# Patient Record
Sex: MALE | Race: WHITE
[De-identification: names, ages, dates, MRNs, and addresses within clinical notes are randomized per-mention and may not be internally consistent; named-entity substitution may affect disease eponyms.]

---

## 2017-03-03 ENCOUNTER — HOSPITAL ENCOUNTER (OUTPATIENT)
Dept: HOSPITAL 47 - LABWHC1 | Age: 72
Discharge: HOME | End: 2017-03-03
Attending: FAMILY MEDICINE
Payer: MEDICARE

## 2017-03-03 DIAGNOSIS — E11.65: Primary | ICD-10-CM

## 2017-03-03 LAB — HEMOGLOBIN A1C: 8.3 % (ref 4.2–6.1)

## 2017-03-03 PROCEDURE — 83036 HEMOGLOBIN GLYCOSYLATED A1C: CPT

## 2017-03-03 PROCEDURE — 36415 COLL VENOUS BLD VENIPUNCTURE: CPT

## 2017-06-26 ENCOUNTER — HOSPITAL ENCOUNTER (OUTPATIENT)
Dept: HOSPITAL 47 - RADCTMAIN | Age: 72
Discharge: HOME | End: 2017-06-26
Attending: FAMILY MEDICINE
Payer: MEDICARE

## 2017-06-26 DIAGNOSIS — G93.89: ICD-10-CM

## 2017-06-26 DIAGNOSIS — J34.9: ICD-10-CM

## 2017-06-26 DIAGNOSIS — G31.9: Primary | ICD-10-CM

## 2017-06-26 PROCEDURE — 70450 CT HEAD/BRAIN W/O DYE: CPT

## 2017-06-26 NOTE — CT
EXAMINATION TYPE: CT brain wo con

 

DATE OF EXAM: 6/26/2017

 

COMPARISON: NONE

 

HISTORY: 71-year-old male confusion, significant weight loss

 

TECHNIQUE:  Examination was done in axial plane without intravenous contrast.  Coronal and sagittal r
econstructions performed.

 

CT DLP: 1036.0 mGycm

Automated exposure control for dose reduction was used.

 

FINDINGS:

There is no evidence of  acute intracranial hemorrhage, acute ischemic changes, mass, mass-effect, or
 extra-axial fluid collection.  There is no effacement of cerebral sulci or basal subarachnoid cister
ns.  There is no hydrocephalus.  There is no midline shift.  Gray-white matter distinction is preserv
ed.

 

There is mild to moderate generalized cerebral cortical atrophy with moderate patchy periventricular 
and deep white matter hypodensities.

 

Moderate mucosal thickening throughout the right maxillary sinus. Mastoid air cells well pneumatized.
 Orbits and globes appear intact.

 

 

 

 

IMPRESSION:

 

1. No acute intracranial abnormality seen. 

2. Mild-to-moderate cerebral cortical atrophy and moderate patchy changes of chronic small vessel isc
hemic disease.

3. Moderate chronic right maxillary sinus disease.

## 2018-04-13 ENCOUNTER — HOSPITAL ENCOUNTER (OUTPATIENT)
Dept: HOSPITAL 47 - LABWHC1 | Age: 73
Discharge: HOME | End: 2018-04-13
Attending: FAMILY MEDICINE
Payer: MEDICARE

## 2018-04-13 DIAGNOSIS — E11.9: ICD-10-CM

## 2018-04-13 DIAGNOSIS — C61: Primary | ICD-10-CM

## 2018-04-13 LAB
ANION GAP SERPL CALC-SCNC: 17 MMOL/L
BUN SERPL-SCNC: 34 MG/DL (ref 9–20)
CALCIUM SPEC-MCNC: 9.9 MG/DL (ref 8.4–10.2)
CHLORIDE SERPL-SCNC: 102 MMOL/L (ref 98–107)
CO2 SERPL-SCNC: 26 MMOL/L (ref 22–30)
GLUCOSE SERPL-MCNC: 242 MG/DL (ref 74–99)
HBA1C MFR BLD: 6.3 % (ref 4–6)
POTASSIUM SERPL-SCNC: 5.2 MMOL/L (ref 3.5–5.1)
PSA SERPL-MCNC: <0.1 NG/ML (ref 0–4)
SODIUM SERPL-SCNC: 145 MMOL/L (ref 137–145)

## 2018-04-13 PROCEDURE — 80048 BASIC METABOLIC PNL TOTAL CA: CPT

## 2018-04-13 PROCEDURE — 36415 COLL VENOUS BLD VENIPUNCTURE: CPT

## 2018-04-13 PROCEDURE — 83036 HEMOGLOBIN GLYCOSYLATED A1C: CPT

## 2018-07-17 ENCOUNTER — HOSPITAL ENCOUNTER (OUTPATIENT)
Dept: HOSPITAL 47 - LABWHC1 | Age: 73
Discharge: HOME | End: 2018-07-17
Attending: FAMILY MEDICINE
Payer: MEDICARE

## 2018-07-17 DIAGNOSIS — E11.9: ICD-10-CM

## 2018-07-17 DIAGNOSIS — E78.5: Primary | ICD-10-CM

## 2018-07-17 LAB
ALBUMIN SERPL-MCNC: 4.1 G/DL (ref 3.5–5)
ALP SERPL-CCNC: 90 U/L (ref 38–126)
ALT SERPL-CCNC: 21 U/L (ref 21–72)
ANION GAP SERPL CALC-SCNC: 10 MMOL/L
AST SERPL-CCNC: 14 U/L (ref 17–59)
BUN SERPL-SCNC: 32 MG/DL (ref 9–20)
CALCIUM SPEC-MCNC: 9.4 MG/DL (ref 8.4–10.2)
CHLORIDE SERPL-SCNC: 105 MMOL/L (ref 98–107)
CHOLEST SERPL-MCNC: 121 MG/DL (ref ?–200)
CO2 SERPL-SCNC: 27 MMOL/L (ref 22–30)
GLUCOSE SERPL-MCNC: 225 MG/DL (ref 74–99)
HBA1C MFR BLD: 8 % (ref 4–6)
HDLC SERPL-MCNC: 42 MG/DL (ref 40–60)
LDLC SERPL CALC-MCNC: 46 MG/DL (ref 0–99)
POTASSIUM SERPL-SCNC: 4.7 MMOL/L (ref 3.5–5.1)
PROT SERPL-MCNC: 6.7 G/DL (ref 6.3–8.2)
SODIUM SERPL-SCNC: 142 MMOL/L (ref 137–145)
TRIGL SERPL-MCNC: 167 MG/DL (ref ?–150)

## 2018-07-17 PROCEDURE — 83036 HEMOGLOBIN GLYCOSYLATED A1C: CPT

## 2018-07-17 PROCEDURE — 36415 COLL VENOUS BLD VENIPUNCTURE: CPT

## 2018-07-17 PROCEDURE — 80053 COMPREHEN METABOLIC PANEL: CPT

## 2018-07-17 PROCEDURE — 80061 LIPID PANEL: CPT

## 2018-08-09 ENCOUNTER — HOSPITAL ENCOUNTER (OUTPATIENT)
Dept: HOSPITAL 47 - RADUSWWP | Age: 73
Discharge: HOME | End: 2018-08-09
Attending: FAMILY MEDICINE
Payer: MEDICARE

## 2018-08-09 DIAGNOSIS — N18.3: Primary | ICD-10-CM

## 2018-08-09 PROCEDURE — 76770 US EXAM ABDO BACK WALL COMP: CPT

## 2018-08-09 NOTE — US
EXAMINATION TYPE: US kidneys/renal and bladder

 

DATE OF EXAM: 8/9/2018

 

COMPARISON: NONE

 

CLINICAL HISTORY: N18.3 Chronic kidney disease stage 3.

 

EXAM MEASUREMENTS:

 

Right Kidney:  11.4 x 4.9 x 5.7 cm

Left Kidney: 11.0 x 5.5 x 5.5 cm

 

 

 

 

Right Kidney: No hydronephrosis or masses seen  

Left Kidney: No hydronephrosis or masses seen  

Bladder: distended

**Bilateral Jets seen: Yes

 

 

There is no evidence for hydronephrosis at this point in time.  No nephrolithiasis is seen.  No nenita
s are identified.  The urinary bladder is anechoic.  Bilateral ureteral jets are seen.

 

 

 

IMPRESSION:

No hydronephrosis is evident bilaterally.

## 2018-10-22 ENCOUNTER — HOSPITAL ENCOUNTER (OUTPATIENT)
Dept: HOSPITAL 47 - LABWHC1 | Age: 73
Discharge: HOME | End: 2018-10-22
Payer: MEDICARE

## 2018-10-22 DIAGNOSIS — E11.22: ICD-10-CM

## 2018-10-22 DIAGNOSIS — N18.3: ICD-10-CM

## 2018-10-22 DIAGNOSIS — C61: ICD-10-CM

## 2018-10-22 DIAGNOSIS — E11.65: Primary | ICD-10-CM

## 2018-10-22 LAB
ANION GAP SERPL CALC-SCNC: 10 MMOL/L
BASOPHILS # BLD AUTO: 0 K/UL (ref 0–0.2)
BASOPHILS NFR BLD AUTO: 1 %
BUN SERPL-SCNC: 34 MG/DL (ref 9–20)
CALCIUM SPEC-MCNC: 9.2 MG/DL (ref 8.4–10.2)
CHLORIDE SERPL-SCNC: 106 MMOL/L (ref 98–107)
CO2 SERPL-SCNC: 26 MMOL/L (ref 22–30)
EOSINOPHIL # BLD AUTO: 0.5 K/UL (ref 0–0.7)
EOSINOPHIL NFR BLD AUTO: 7 %
ERYTHROCYTE [DISTWIDTH] IN BLOOD BY AUTOMATED COUNT: 3.66 M/UL (ref 4.3–5.9)
ERYTHROCYTE [DISTWIDTH] IN BLOOD: 12.5 % (ref 11.5–15.5)
GLUCOSE SERPL-MCNC: 322 MG/DL (ref 74–99)
HBA1C MFR BLD: 8.5 % (ref 4–6)
HCT VFR BLD AUTO: 36.3 % (ref 39–53)
HGB BLD-MCNC: 11.9 GM/DL (ref 13–17.5)
LYMPHOCYTES # SPEC AUTO: 0.9 K/UL (ref 1–4.8)
LYMPHOCYTES NFR SPEC AUTO: 15 %
MAGNESIUM SPEC-SCNC: 1.9 MG/DL (ref 1.6–2.3)
MCH RBC QN AUTO: 32.6 PG (ref 25–35)
MCHC RBC AUTO-ENTMCNC: 32.9 G/DL (ref 31–37)
MCV RBC AUTO: 99.1 FL (ref 80–100)
MONOCYTES # BLD AUTO: 0.3 K/UL (ref 0–1)
MONOCYTES NFR BLD AUTO: 5 %
NEUTROPHILS # BLD AUTO: 4.5 K/UL (ref 1.3–7.7)
NEUTROPHILS NFR BLD AUTO: 71 %
PLATELET # BLD AUTO: 153 K/UL (ref 150–450)
POTASSIUM SERPL-SCNC: 4.7 MMOL/L (ref 3.5–5.1)
PSA SERPL-MCNC: <0.1 NG/ML (ref 0–4)
PTH-INTACT SERPL-MCNC: 65.3 PG/ML (ref 14–72)
SODIUM SERPL-SCNC: 142 MMOL/L (ref 137–145)
URATE SERPL-MCNC: 5.2 MG/DL (ref 3.5–8.5)
WBC # BLD AUTO: 6.4 K/UL (ref 3.8–10.6)

## 2018-10-22 PROCEDURE — 82306 VITAMIN D 25 HYDROXY: CPT

## 2018-10-22 PROCEDURE — 82043 UR ALBUMIN QUANTITATIVE: CPT

## 2018-10-22 PROCEDURE — 83970 ASSAY OF PARATHORMONE: CPT

## 2018-10-22 PROCEDURE — 84550 ASSAY OF BLOOD/URIC ACID: CPT

## 2018-10-22 PROCEDURE — 84100 ASSAY OF PHOSPHORUS: CPT

## 2018-10-22 PROCEDURE — 80048 BASIC METABOLIC PNL TOTAL CA: CPT

## 2018-10-22 PROCEDURE — 83036 HEMOGLOBIN GLYCOSYLATED A1C: CPT

## 2018-10-22 PROCEDURE — 85025 COMPLETE CBC W/AUTO DIFF WBC: CPT

## 2018-10-22 PROCEDURE — 82570 ASSAY OF URINE CREATININE: CPT

## 2018-10-22 PROCEDURE — 36415 COLL VENOUS BLD VENIPUNCTURE: CPT

## 2018-10-22 PROCEDURE — 83735 ASSAY OF MAGNESIUM: CPT

## 2018-11-26 ENCOUNTER — HOSPITAL ENCOUNTER (OUTPATIENT)
Dept: HOSPITAL 47 - LABWHC1 | Age: 73
Discharge: HOME | End: 2018-11-26
Attending: INTERNAL MEDICINE
Payer: MEDICARE

## 2018-11-26 DIAGNOSIS — N39.0: ICD-10-CM

## 2018-11-26 DIAGNOSIS — D64.9: Primary | ICD-10-CM

## 2018-11-26 DIAGNOSIS — E83.39: ICD-10-CM

## 2018-11-26 LAB
ALBUMIN SERPL-MCNC: 4.3 G/DL (ref 3.8–4.9)
ALBUMIN/GLOB SERPL: 2.05 G/DL (ref 1.2–2.1)
ALP SERPL-CCNC: 88 U/L (ref 41–126)
ALT SERPL-CCNC: 11 U/L (ref 10–49)
ANION GAP SERPL CALC-SCNC: 5.8 MMOL/L (ref 4–12)
AST SERPL-CCNC: 14 U/L (ref 14–35)
BUN SERPL-SCNC: 38 MG/DL (ref 9–27)
CALCIUM SPEC-MCNC: 9 MG/DL (ref 8.7–10.3)
CHLORIDE SERPL-SCNC: 110 MMOL/L (ref 96–109)
CO2 SERPL-SCNC: 26.2 MMOL/L (ref 21.6–31.8)
ERYTHROCYTE [DISTWIDTH] IN BLOOD BY AUTOMATED COUNT: 3.54 M/UL (ref 4.3–5.9)
ERYTHROCYTE [DISTWIDTH] IN BLOOD: 12.5 % (ref 11.5–15.5)
GLOBULIN SER CALC-MCNC: 2.1 G/DL (ref 2.1–3.7)
GLUCOSE SERPL-MCNC: 313 MG/DL (ref 70–110)
GLUCOSE UR QL: (no result)
HCT VFR BLD AUTO: 35.1 % (ref 39–53)
HGB BLD-MCNC: 11.7 GM/DL (ref 13–17.5)
MCH RBC QN AUTO: 33 PG (ref 25–35)
MCHC RBC AUTO-ENTMCNC: 33.3 G/DL (ref 31–37)
MCV RBC AUTO: 99 FL (ref 80–100)
PH UR: 5 [PH] (ref 5–8)
PLATELET # BLD AUTO: 157 K/UL (ref 150–450)
POTASSIUM SERPL-SCNC: 4.4 MMOL/L (ref 3.5–5.5)
PROT SERPL-MCNC: 6.4 G/DL (ref 6.2–8.2)
PROT UR QL: (no result)
RBC UR QL: 4 /HPF (ref 0–5)
SODIUM SERPL-SCNC: 142 MMOL/L (ref 135–145)
SP GR UR: 1.01 (ref 1–1.03)
SQUAMOUS UR QL AUTO: <1 /HPF (ref 0–4)
UROBILINOGEN UR QL STRIP: <2 MG/DL (ref ?–2)
WBC # BLD AUTO: 6.6 K/UL (ref 3.8–10.6)
WBC #/AREA URNS HPF: 3 /HPF (ref 0–5)

## 2018-11-26 PROCEDURE — 36415 COLL VENOUS BLD VENIPUNCTURE: CPT

## 2018-11-26 PROCEDURE — 85027 COMPLETE CBC AUTOMATED: CPT

## 2018-11-26 PROCEDURE — 84100 ASSAY OF PHOSPHORUS: CPT

## 2018-11-26 PROCEDURE — 81001 URINALYSIS AUTO W/SCOPE: CPT

## 2018-11-26 PROCEDURE — 80053 COMPREHEN METABOLIC PANEL: CPT

## 2018-12-11 ENCOUNTER — HOSPITAL ENCOUNTER (OUTPATIENT)
Dept: HOSPITAL 47 - ORWHC2ENDO | Age: 73
Discharge: HOME | End: 2018-12-11
Attending: SURGERY
Payer: MEDICARE

## 2018-12-11 VITALS — BODY MASS INDEX: 21.3 KG/M2

## 2018-12-11 VITALS — RESPIRATION RATE: 20 BRPM | HEART RATE: 95 BPM

## 2018-12-11 VITALS — TEMPERATURE: 98.7 F

## 2018-12-11 VITALS — SYSTOLIC BLOOD PRESSURE: 143 MMHG | DIASTOLIC BLOOD PRESSURE: 81 MMHG

## 2018-12-11 DIAGNOSIS — Z88.2: ICD-10-CM

## 2018-12-11 DIAGNOSIS — Z79.84: ICD-10-CM

## 2018-12-11 DIAGNOSIS — Z79.1: ICD-10-CM

## 2018-12-11 DIAGNOSIS — E11.9: ICD-10-CM

## 2018-12-11 DIAGNOSIS — Z79.82: ICD-10-CM

## 2018-12-11 DIAGNOSIS — Z85.46: ICD-10-CM

## 2018-12-11 DIAGNOSIS — I10: ICD-10-CM

## 2018-12-11 DIAGNOSIS — E78.5: ICD-10-CM

## 2018-12-11 DIAGNOSIS — Z12.11: Primary | ICD-10-CM

## 2018-12-11 LAB
GLUCOSE BLD-MCNC: 212 MG/DL (ref 75–99)
GLUCOSE BLD-MCNC: 224 MG/DL (ref 75–99)

## 2018-12-11 PROCEDURE — 45378 DIAGNOSTIC COLONOSCOPY: CPT

## 2018-12-11 NOTE — P.OP
Date of Procedure: 12/11/18


Preoperative Diagnosis: 


Screening colonoscopy


Postoperative Diagnosis: 


Poor colonic preparation


Procedure(s) Performed: 


Attempted colonoscopy


Anesthesia: MAC


Surgeon: Edward Bhat


Pathology: none sent (Home)


Condition: stable


Disposition: PACU


Description of Procedure: 


The patient's placed on the endoscopy table in the lateral position.  He 

received IV sedation.  Digital rectal exam was performed which revealed a large 

amount of solid stool in the rectum.  The colonoscope was placed patient anus 

and passed the rectum.  The large stool burden was seen.  At this point the 

scope was withdrawn.  The patient will need to be reprepped and brought back 

for colonoscopy.

## 2018-12-11 NOTE — P.GSHP
History of Present Illness


H&P Date: 12/11/18


Chief Complaint: Screening colonoscopy





This a 73-year-old male referred from Dr. Busby.  Patient presents today for 

screening colonoscopy.  She denies a significant GI complaints





Past Medical History


Past Medical History: Cancer, Diabetes Mellitus, Prostate Disorder, Renal 

Disease


Additional Past Medical History / Comment(s): HX PROSTATE CANCER WITH SURGERY (

2008), STATES DECREASED KIDNEY FUNCTION (SEEN DR ANAND).


History of Any Multi-Drug Resistant Organisms: None Reported


Past Surgical History: Orthopedic Surgery, Prostate Surgery


Additional Past Surgical History / Comment(s): SURGERY FOR RIGHT ANKLE & RIGHT 

LEG FX WITH PINS & PLATE (2010), CATARACTS.


Past Anesthesia/Blood Transfusion Reactions: No Reported Reaction


Past Psychological History: No Psychological Hx Reported


Smoking Status: Never smoker


Past Alcohol Use History: Rare


Past Drug Use History: None Reported





- Past Family History


  ** Mother


Family Medical History: No Reported History





Medications and Allergies


 Home Medications











 Medication  Instructions  Recorded  Confirmed  Type


 


Aspirin [Adult Low Dose Aspirin EC] 81 mg PO DAILY 12/10/18 12/10/18 History


 


Ibuprofen [Motrin Ib] 400 mg PO AS DIRECTED PRN 12/10/18 12/10/18 History


 


Simvastatin [Zocor] 40 mg PO DAILY 12/10/18 12/11/18 History


 


Telmisartan [Micardis] 80 mg PO DAILY 12/10/18 12/11/18 History


 


amLODIPine [Norvasc] 5 mg PO AC-LUNCH 12/10/18 12/11/18 History


 


glipiZIDE [Glucotrol] 10 mg PO 1130,1830 12/10/18 12/11/18 History


 


sitaGLIPtin [Januvia] 50 mg PO AC-LUNCH 12/10/18 12/11/18 History











 Allergies











Allergy/AdvReac Type Severity Reaction Status Date / Time


 


Fish Containing Products Allergy  Swelling Verified 12/11/18 07:09





[Fish]     


 


Sulfa (Sulfonamide Allergy  Unknown Verified 12/11/18 07:09





Antibiotics)   Childhood  














Surgical - Exam


 Vital Signs











Temp Pulse Resp BP Pulse Ox


 


 98.7 F   103 H  18   158/73   98 


 


 12/11/18 07:22  12/11/18 07:22  12/11/18 07:22  12/11/18 07:22  12/11/18 07:22














- General


well developed, no distress





- Eyes


PERRL





- ENT


normal pinna





- Neck


no masses





- Respiratory


normal expansion





- Cardiovascular


Rhythm: regular





- Abdomen


Abdomen: soft, non tender





Results





- Labs


 Abnormal Lab Results - Last 24 Hours (Table)











  12/11/18 Range/Units





  07:26 


 


POC Glucose (mg/dL)  212 H  (75-99)  mg/dL














Assessment and Plan


Assessment: 





We'll perform screening colonoscopy.

## 2018-12-12 ENCOUNTER — HOSPITAL ENCOUNTER (OUTPATIENT)
Dept: HOSPITAL 47 - ORWHC2ENDO | Age: 73
Discharge: HOME | End: 2018-12-12
Attending: SURGERY
Payer: MEDICARE

## 2018-12-12 VITALS — SYSTOLIC BLOOD PRESSURE: 123 MMHG | HEART RATE: 92 BPM | DIASTOLIC BLOOD PRESSURE: 72 MMHG

## 2018-12-12 VITALS — TEMPERATURE: 98.5 F

## 2018-12-12 VITALS — RESPIRATION RATE: 16 BRPM

## 2018-12-12 DIAGNOSIS — Z79.82: ICD-10-CM

## 2018-12-12 DIAGNOSIS — Z91.013: ICD-10-CM

## 2018-12-12 DIAGNOSIS — Z12.11: Primary | ICD-10-CM

## 2018-12-12 DIAGNOSIS — N42.9: ICD-10-CM

## 2018-12-12 DIAGNOSIS — K57.30: ICD-10-CM

## 2018-12-12 DIAGNOSIS — Z79.899: ICD-10-CM

## 2018-12-12 DIAGNOSIS — E11.9: ICD-10-CM

## 2018-12-12 DIAGNOSIS — Z88.2: ICD-10-CM

## 2018-12-12 DIAGNOSIS — Z79.84: ICD-10-CM

## 2018-12-12 DIAGNOSIS — Z85.9: ICD-10-CM

## 2018-12-12 LAB — GLUCOSE BLD-MCNC: 216 MG/DL (ref 75–99)

## 2018-12-12 NOTE — P.GSHP
History of Present Illness


H&P Date: 12/12/18


Chief Complaint: Screening colonoscopy





This is a 73-year-old male who presents today for colonoscopy.  Patient had an 

attempted colonoscopy yesterday.  However he was fully stool.  Patient 

reprepped and presents today for colonoscopy.





Past Medical History


Past Medical History: Cancer, Diabetes Mellitus, Prostate Disorder


Additional Past Medical History / Comment(s): PROSTATE


History of Any Multi-Drug Resistant Organisms: None Reported


Past Surgical History: Orthopedic Surgery, Prostate Surgery


Additional Past Surgical History / Comment(s): RIGHT ANKLE, RIGHT LEG, RIGHT 

CATARACT


Past Anesthesia/Blood Transfusion Reactions: No Reported Reaction


Past Psychological History: No Psychological Hx Reported


Smoking Status: Never smoker


Past Alcohol Use History: Rare


Past Drug Use History: None Reported





- Past Family History


  ** Mother


Family Medical History: No Reported History





Medications and Allergies


 Home Medications











 Medication  Instructions  Recorded  Confirmed  Type


 


Aspirin [Adult Low Dose Aspirin EC] 81 mg PO DAILY 12/10/18 12/12/18 History


 


Ibuprofen [Motrin Ib] 400 mg PO AS DIRECTED PRN 12/10/18 12/12/18 History


 


Simvastatin [Zocor] 40 mg PO DAILY 12/10/18 12/12/18 History


 


Telmisartan [Micardis] 80 mg PO DAILY 12/10/18 12/12/18 History


 


amLODIPine [Norvasc] 5 mg PO AC-LUNCH 12/10/18 12/12/18 History


 


glipiZIDE [Glucotrol] 10 mg PO 1130,1830 12/10/18 12/12/18 History


 


sitaGLIPtin [Januvia] 50 mg PO AC-LUNCH 12/10/18 12/12/18 History











 Allergies











Allergy/AdvReac Type Severity Reaction Status Date / Time


 


Fish Containing Products Allergy  Swelling Verified 12/12/18 10:04





[Fish]     


 


Sulfa (Sulfonamide Allergy  Unknown Verified 12/12/18 10:04





Antibiotics)   Childhood  














Surgical - Exam


 Vital Signs











Temp Pulse Resp BP Pulse Ox


 


 98.5 F   113 H  18   141/65   97 


 


 12/12/18 10:21  12/12/18 10:21  12/12/18 10:21  12/12/18 10:21  12/12/18 10:21














- General


well developed, no distress





- Eyes


PERRL





- ENT


normal pinna





- Neck


no masses





- Respiratory


normal expansion





- Cardiovascular


Rhythm: regular





- Abdomen


Abdomen: soft, non tender





Results





- Labs


 Abnormal Lab Results - Last 24 Hours (Table)











  12/12/18 Range/Units





  10:12 


 


POC Glucose (mg/dL)  216 H  (75-99)  mg/dL














Assessment and Plan


Assessment: 





Constipation





We'll perform screening colonoscopy.

## 2018-12-12 NOTE — P.OP
Date of Procedure: 12/12/18


Preoperative Diagnosis: 


Screening colonoscopy


Postoperative Diagnosis: 


Diverticulosis


Procedure(s) Performed: 


Colonoscopy


Anesthesia: MAC


Surgeon: Edward Bhat


Pathology: none sent


Condition: stable


Disposition: PACU


Description of Procedure: 


The patient's placed on the endoscopy table in the lateral position.  He 

received IV sedation.  Digital rectal exam was performed which revealed no 

abnormalities.  The flexible colonoscope was then placed patient anus passed 

throughout the entire colon.  The ileocecal valve was visualized.  Cecum, 

ascending and transverse colon appeared normal.  In the descending and sigmoid 

colon there is moderate diverticular changes.  Scope was then brought back the 

rectum and this appeared normal.  Scope was withdrawn for patient.

## 2018-12-28 ENCOUNTER — HOSPITAL ENCOUNTER (OUTPATIENT)
Dept: HOSPITAL 47 - LABWHC1 | Age: 73
End: 2018-12-28
Attending: INTERNAL MEDICINE
Payer: MEDICARE

## 2018-12-28 DIAGNOSIS — N18.4: ICD-10-CM

## 2018-12-28 DIAGNOSIS — R53.83: ICD-10-CM

## 2018-12-28 DIAGNOSIS — M10.9: ICD-10-CM

## 2018-12-28 DIAGNOSIS — R80.9: ICD-10-CM

## 2018-12-28 DIAGNOSIS — E21.3: ICD-10-CM

## 2018-12-28 DIAGNOSIS — E55.9: Primary | ICD-10-CM

## 2018-12-28 DIAGNOSIS — D63.1: ICD-10-CM

## 2018-12-28 LAB
ALBUMIN SERPL-MCNC: 4.3 G/DL (ref 3.8–4.9)
ANION GAP SERPL CALC-SCNC: 10.5 MMOL/L (ref 4–12)
BASOPHILS # BLD AUTO: 0 K/UL (ref 0–0.2)
BASOPHILS NFR BLD AUTO: 1 %
BUN SERPL-SCNC: 28 MG/DL (ref 9–27)
CALCIUM SPEC-MCNC: 8.8 MG/DL (ref 8.7–10.3)
CHLORIDE SERPL-SCNC: 110 MMOL/L (ref 96–109)
CO2 SERPL-SCNC: 23.5 MMOL/L (ref 21.6–31.8)
DSDNA AB SER QL: NEGATIVE
DSDNA AB TITR SER: <1 IU/ML
EOSINOPHIL # BLD AUTO: 0.2 K/UL (ref 0–0.7)
EOSINOPHIL NFR BLD AUTO: 4 %
ERYTHROCYTE [DISTWIDTH] IN BLOOD BY AUTOMATED COUNT: 3.72 M/UL (ref 4.3–5.9)
ERYTHROCYTE [DISTWIDTH] IN BLOOD: 12.8 % (ref 11.5–15.5)
GLUCOSE SERPL-MCNC: 204 MG/DL (ref 70–110)
GLUCOSE UR QL: (no result)
HCT VFR BLD AUTO: 36.7 % (ref 39–53)
HGB BLD-MCNC: 12.1 GM/DL (ref 13–17.5)
LYMPHOCYTES # SPEC AUTO: 0.9 K/UL (ref 1–4.8)
LYMPHOCYTES NFR SPEC AUTO: 17 %
MAGNESIUM SPEC-SCNC: 2 MG/DL (ref 1.5–2.4)
MCH RBC QN AUTO: 32.6 PG (ref 25–35)
MCHC RBC AUTO-ENTMCNC: 33.1 G/DL (ref 31–37)
MCV RBC AUTO: 98.6 FL (ref 80–100)
MONOCYTES # BLD AUTO: 0.3 K/UL (ref 0–1)
MONOCYTES NFR BLD AUTO: 6 %
NEUTROPHILS # BLD AUTO: 3.7 K/UL (ref 1.3–7.7)
NEUTROPHILS NFR BLD AUTO: 72 %
PH UR: 5.5 [PH] (ref 5–8)
PLATELET # BLD AUTO: 180 K/UL (ref 150–450)
POTASSIUM SERPL-SCNC: 4.4 MMOL/L (ref 3.5–5.5)
PROT UR QL: (no result)
PTH-INTACT SERPL-MCNC: 123.8 PG/ML (ref 14–72)
RBC UR QL: 9 /HPF (ref 0–5)
SODIUM SERPL-SCNC: 144 MMOL/L (ref 135–145)
SP GR UR: 1.02 (ref 1–1.03)
URATE SERPL-MCNC: 5.9 MG/DL (ref 3.7–8.7)
UROBILINOGEN UR QL STRIP: <2 MG/DL (ref ?–2)
WBC # BLD AUTO: 5.1 K/UL (ref 3.8–10.6)
WBC #/AREA URNS HPF: <1 /HPF (ref 0–5)

## 2018-12-28 PROCEDURE — 81001 URINALYSIS AUTO W/SCOPE: CPT

## 2018-12-28 PROCEDURE — 86803 HEPATITIS C AB TEST: CPT

## 2018-12-28 PROCEDURE — 83516 IMMUNOASSAY NONANTIBODY: CPT

## 2018-12-28 PROCEDURE — 82728 ASSAY OF FERRITIN: CPT

## 2018-12-28 PROCEDURE — 86255 FLUORESCENT ANTIBODY SCREEN: CPT

## 2018-12-28 PROCEDURE — 84550 ASSAY OF BLOOD/URIC ACID: CPT

## 2018-12-28 PROCEDURE — 82040 ASSAY OF SERUM ALBUMIN: CPT

## 2018-12-28 PROCEDURE — 86160 COMPLEMENT ANTIGEN: CPT

## 2018-12-28 PROCEDURE — 87340 HEPATITIS B SURFACE AG IA: CPT

## 2018-12-28 PROCEDURE — 83970 ASSAY OF PARATHORMONE: CPT

## 2018-12-28 PROCEDURE — 36415 COLL VENOUS BLD VENIPUNCTURE: CPT

## 2018-12-28 PROCEDURE — 85025 COMPLETE CBC W/AUTO DIFF WBC: CPT

## 2018-12-28 PROCEDURE — 84100 ASSAY OF PHOSPHORUS: CPT

## 2018-12-28 PROCEDURE — 86162 COMPLEMENT TOTAL (CH50): CPT

## 2018-12-28 PROCEDURE — 82570 ASSAY OF URINE CREATININE: CPT

## 2018-12-28 PROCEDURE — 86038 ANTINUCLEAR ANTIBODIES: CPT

## 2018-12-28 PROCEDURE — 86335 IMMUNFIX E-PHORSIS/URINE/CSF: CPT

## 2018-12-28 PROCEDURE — 82306 VITAMIN D 25 HYDROXY: CPT

## 2018-12-28 PROCEDURE — 83540 ASSAY OF IRON: CPT

## 2018-12-28 PROCEDURE — 83735 ASSAY OF MAGNESIUM: CPT

## 2018-12-28 PROCEDURE — 84156 ASSAY OF PROTEIN URINE: CPT

## 2018-12-28 PROCEDURE — 84165 PROTEIN E-PHORESIS SERUM: CPT

## 2018-12-28 PROCEDURE — 83550 IRON BINDING TEST: CPT

## 2018-12-28 PROCEDURE — 86225 DNA ANTIBODY NATIVE: CPT

## 2018-12-28 PROCEDURE — 80048 BASIC METABOLIC PNL TOTAL CA: CPT

## 2018-12-28 PROCEDURE — 81050 URINALYSIS VOLUME MEASURE: CPT

## 2018-12-29 LAB
PROT 24H UR-MRATE: 345.6 MG/24HR
SPECIMEN VOL 24H UR: 800 ML

## 2019-02-01 ENCOUNTER — HOSPITAL ENCOUNTER (OUTPATIENT)
Dept: HOSPITAL 47 - LABWHC1 | Age: 74
Discharge: HOME | End: 2019-02-01
Payer: MEDICARE

## 2019-02-01 DIAGNOSIS — E11.65: ICD-10-CM

## 2019-02-01 DIAGNOSIS — E78.5: Primary | ICD-10-CM

## 2019-02-01 LAB
ALBUMIN SERPL-MCNC: 4.2 G/DL (ref 3.8–4.9)
ALBUMIN/GLOB SERPL: 2.1 G/DL (ref 1.6–3.17)
ALP SERPL-CCNC: 72 U/L (ref 41–126)
ALT SERPL-CCNC: 19 U/L (ref 10–49)
ANION GAP SERPL CALC-SCNC: 12.2 MMOL/L (ref 4–12)
AST SERPL-CCNC: 19 U/L (ref 14–35)
BUN SERPL-SCNC: 33 MG/DL (ref 9–27)
CALCIUM SPEC-MCNC: 9.1 MG/DL (ref 8.7–10.3)
CHLORIDE SERPL-SCNC: 107 MMOL/L (ref 96–109)
CHOLEST SERPL-MCNC: 102 MG/DL (ref 0–200)
CO2 SERPL-SCNC: 23.8 MMOL/L (ref 21.6–31.8)
GLOBULIN SER CALC-MCNC: 2 G/DL (ref 1.6–3.3)
GLUCOSE SERPL-MCNC: 198 MG/DL (ref 70–110)
HBA1C MFR BLD: 7.2 % (ref 4–6)
HDLC SERPL-MCNC: 42 MG/DL (ref 40–60)
LDLC SERPL CALC-MCNC: 45.6 MG/DL (ref 0–131)
POTASSIUM SERPL-SCNC: 4.8 MMOL/L (ref 3.5–5.5)
PROT SERPL-MCNC: 6.2 G/DL (ref 6.2–8.2)
SODIUM SERPL-SCNC: 143 MMOL/L (ref 135–145)
TRIGL SERPL-MCNC: 72 MG/DL (ref 0–149)
VLDLC SERPL CALC-MCNC: 14.4 MG/DL (ref 5–40)

## 2019-02-01 PROCEDURE — 36415 COLL VENOUS BLD VENIPUNCTURE: CPT

## 2019-02-01 PROCEDURE — 83036 HEMOGLOBIN GLYCOSYLATED A1C: CPT

## 2019-02-01 PROCEDURE — 80061 LIPID PANEL: CPT

## 2019-02-01 PROCEDURE — 80053 COMPREHEN METABOLIC PANEL: CPT

## 2019-03-14 ENCOUNTER — HOSPITAL ENCOUNTER (INPATIENT)
Dept: HOSPITAL 47 - EC | Age: 74
LOS: 8 days | Discharge: HOME HEALTH | DRG: 291 | End: 2019-03-22
Attending: INTERNAL MEDICINE | Admitting: INTERNAL MEDICINE
Payer: MEDICARE

## 2019-03-14 VITALS — BODY MASS INDEX: 23.6 KG/M2

## 2019-03-14 DIAGNOSIS — E11.65: ICD-10-CM

## 2019-03-14 DIAGNOSIS — Z79.82: ICD-10-CM

## 2019-03-14 DIAGNOSIS — E78.5: ICD-10-CM

## 2019-03-14 DIAGNOSIS — E11.22: ICD-10-CM

## 2019-03-14 DIAGNOSIS — I13.0: Primary | ICD-10-CM

## 2019-03-14 DIAGNOSIS — I25.10: ICD-10-CM

## 2019-03-14 DIAGNOSIS — E78.00: ICD-10-CM

## 2019-03-14 DIAGNOSIS — Z98.41: ICD-10-CM

## 2019-03-14 DIAGNOSIS — I27.20: ICD-10-CM

## 2019-03-14 DIAGNOSIS — N18.4: ICD-10-CM

## 2019-03-14 DIAGNOSIS — I50.23: ICD-10-CM

## 2019-03-14 DIAGNOSIS — I24.8: ICD-10-CM

## 2019-03-14 DIAGNOSIS — I08.1: ICD-10-CM

## 2019-03-14 DIAGNOSIS — T50.2X5A: ICD-10-CM

## 2019-03-14 DIAGNOSIS — I25.2: ICD-10-CM

## 2019-03-14 DIAGNOSIS — I25.5: ICD-10-CM

## 2019-03-14 DIAGNOSIS — N17.9: ICD-10-CM

## 2019-03-14 DIAGNOSIS — Z88.2: ICD-10-CM

## 2019-03-14 DIAGNOSIS — Z79.84: ICD-10-CM

## 2019-03-14 DIAGNOSIS — R32: ICD-10-CM

## 2019-03-14 DIAGNOSIS — Z96.1: ICD-10-CM

## 2019-03-14 DIAGNOSIS — Z91.013: ICD-10-CM

## 2019-03-14 DIAGNOSIS — Z98.42: ICD-10-CM

## 2019-03-14 DIAGNOSIS — Z79.899: ICD-10-CM

## 2019-03-14 DIAGNOSIS — E87.3: ICD-10-CM

## 2019-03-14 DIAGNOSIS — Z85.46: ICD-10-CM

## 2019-03-14 LAB
ALBUMIN SERPL-MCNC: 3.8 G/DL (ref 3.5–5)
ALP SERPL-CCNC: 106 U/L (ref 38–126)
ALT SERPL-CCNC: 48 U/L (ref 21–72)
ANION GAP SERPL CALC-SCNC: 10 MMOL/L
APTT BLD: 22.8 SEC (ref 22–30)
AST SERPL-CCNC: 24 U/L (ref 17–59)
BASOPHILS # BLD AUTO: 0 K/UL (ref 0–0.2)
BASOPHILS NFR BLD AUTO: 0 %
BUN SERPL-SCNC: 38 MG/DL (ref 9–20)
CALCIUM SPEC-MCNC: 9.3 MG/DL (ref 8.4–10.2)
CHLORIDE SERPL-SCNC: 101 MMOL/L (ref 98–107)
CO2 SERPL-SCNC: 26 MMOL/L (ref 22–30)
D DIMER PPP FEU-MCNC: 1.07 MG/L FEU (ref ?–0.6)
EOSINOPHIL # BLD AUTO: 0.2 K/UL (ref 0–0.7)
EOSINOPHIL NFR BLD AUTO: 2 %
ERYTHROCYTE [DISTWIDTH] IN BLOOD BY AUTOMATED COUNT: 3.96 M/UL (ref 4.3–5.9)
ERYTHROCYTE [DISTWIDTH] IN BLOOD: 14.3 % (ref 11.5–15.5)
GLUCOSE BLD-MCNC: 298 MG/DL (ref 75–99)
GLUCOSE SERPL-MCNC: 268 MG/DL (ref 74–99)
HCT VFR BLD AUTO: 39.2 % (ref 39–53)
HGB BLD-MCNC: 12.7 GM/DL (ref 13–17.5)
INR PPP: 1.2 (ref ?–1.2)
LYMPHOCYTES # SPEC AUTO: 1.1 K/UL (ref 1–4.8)
LYMPHOCYTES NFR SPEC AUTO: 17 %
MAGNESIUM SPEC-SCNC: 2.1 MG/DL (ref 1.6–2.3)
MCH RBC QN AUTO: 32.2 PG (ref 25–35)
MCHC RBC AUTO-ENTMCNC: 32.4 G/DL (ref 31–37)
MCV RBC AUTO: 99.2 FL (ref 80–100)
MONOCYTES # BLD AUTO: 0.5 K/UL (ref 0–1)
MONOCYTES NFR BLD AUTO: 7 %
NEUTROPHILS # BLD AUTO: 4.6 K/UL (ref 1.3–7.7)
NEUTROPHILS NFR BLD AUTO: 71 %
PH UR: 6 [PH] (ref 5–8)
PLATELET # BLD AUTO: 163 K/UL (ref 150–450)
POTASSIUM SERPL-SCNC: 4.5 MMOL/L (ref 3.5–5.1)
PROT SERPL-MCNC: 6.4 G/DL (ref 6.3–8.2)
PROT UR QL: (no result)
PT BLD: 12.2 SEC (ref 9–12)
RBC UR QL: 1 /HPF (ref 0–5)
SODIUM SERPL-SCNC: 137 MMOL/L (ref 137–145)
SP GR UR: 1.01 (ref 1–1.03)
SQUAMOUS UR QL AUTO: <1 /HPF (ref 0–4)
UROBILINOGEN UR QL STRIP: <2 MG/DL (ref ?–2)
WBC # BLD AUTO: 6.4 K/UL (ref 3.8–10.6)

## 2019-03-14 PROCEDURE — 81001 URINALYSIS AUTO W/SCOPE: CPT

## 2019-03-14 PROCEDURE — 85379 FIBRIN DEGRADATION QUANT: CPT

## 2019-03-14 PROCEDURE — 94760 N-INVAS EAR/PLS OXIMETRY 1: CPT

## 2019-03-14 PROCEDURE — 80053 COMPREHEN METABOLIC PANEL: CPT

## 2019-03-14 PROCEDURE — 94640 AIRWAY INHALATION TREATMENT: CPT

## 2019-03-14 PROCEDURE — 93005 ELECTROCARDIOGRAM TRACING: CPT

## 2019-03-14 PROCEDURE — 36415 COLL VENOUS BLD VENIPUNCTURE: CPT

## 2019-03-14 PROCEDURE — 80048 BASIC METABOLIC PNL TOTAL CA: CPT

## 2019-03-14 PROCEDURE — 85730 THROMBOPLASTIN TIME PARTIAL: CPT

## 2019-03-14 PROCEDURE — 83735 ASSAY OF MAGNESIUM: CPT

## 2019-03-14 PROCEDURE — 84484 ASSAY OF TROPONIN QUANT: CPT

## 2019-03-14 PROCEDURE — 99285 EMERGENCY DEPT VISIT HI MDM: CPT

## 2019-03-14 PROCEDURE — 93970 EXTREMITY STUDY: CPT

## 2019-03-14 PROCEDURE — 85025 COMPLETE CBC W/AUTO DIFF WBC: CPT

## 2019-03-14 PROCEDURE — 85027 COMPLETE CBC AUTOMATED: CPT

## 2019-03-14 PROCEDURE — 85610 PROTHROMBIN TIME: CPT

## 2019-03-14 PROCEDURE — 71046 X-RAY EXAM CHEST 2 VIEWS: CPT

## 2019-03-14 PROCEDURE — 83880 ASSAY OF NATRIURETIC PEPTIDE: CPT

## 2019-03-14 PROCEDURE — 93306 TTE W/DOPPLER COMPLETE: CPT

## 2019-03-14 RX ADMIN — HEPARIN SODIUM SCH UNIT: 5000 INJECTION, SOLUTION INTRAVENOUS; SUBCUTANEOUS at 23:53

## 2019-03-14 RX ADMIN — INSULIN DETEMIR SCH UNIT: 100 INJECTION, SOLUTION SUBCUTANEOUS at 23:53

## 2019-03-14 NOTE — US
EXAMINATION TYPE: US venous doppler duplex LE 

 

DATE OF EXAM: 3/14/2019 8:35 PM

 

COMPARISON: NONE

 

CLINICAL HISTORY: Pain. Swellilng.

 

SIDE PERFORMED: Bilateral  

 

TECHNIQUE:  The lower extremity deep venous system is examined utilizing real time linear array sonog
janes with graded compression, doppler sonography and color-flow sonography.

 

VESSELS IMAGED:

External Iliac Vein (EIV)

Common Femoral Vein

Deep Femoral Vein

Greater Saphenous Vein *

Femoral Vein

Popliteal Vein

Small Saphenous Vein *

Proximal Calf Veins

(* superficial vessels)

 

 

 

Right Leg:  Negative for DVT

 

Left Leg:  Negative for DVT

 

No evidence of DVT bilateral legs.

 

IMPRESSION: Negative exam. No evidence of deep venous thrombosis.

## 2019-03-14 NOTE — XR
EXAMINATION TYPE: XR chest 2V

 

DATE OF EXAM: 3/14/2019

 

COMPARISON: NONE

 

HISTORY: Short of breath

 

TECHNIQUE:  Frontal and lateral views of the chest are obtained.

 

FINDINGS:  Heart is enlarged. There is pulmonary vascular congestion. There is blunting of costophren
ic angles. There are chest leads.

 

IMPRESSION:  Cardiomegaly. There is evidence of mild heart failure.

## 2019-03-14 NOTE — ED
SOB HPI





- General


Chief Complaint: Shortness of Breath


Stated Complaint: CHF/leg swelling


Time Seen by Provider: 03/14/19 19:07


Source: patient, family, RN notes reviewed, old records reviewed


Mode of arrival: wheelchair


Limitations: no limitations





- History of Present Illness


Initial Comments: 





This is a 73-year-old male the ER for evaluation.  This patient presents for 

evaluation regarding shortness of breath.  Significant for shortness of breath 

of lower extremity edema.  Exertional shortness of breath and dyspnea.  No fever

cough or congestion.  History of heart failure diabetes and hypertension.  

Patient denies current chest pain.  No pain in his legs just swelling.  's,

no travel history or sick contacts


MD Complaint: shortness of breath


-: days(s)


Radiation: other (No pain)


Severity: moderate


Severity scale (1-10): 5


Consistency: constant


Improves With: rest


Worsens With: exertion, movement


Known History Of: congestive heart failure


Associated Symptoms: cough, orthopnea


Treatments Prior to Arrival: none





- Related Data


                                Home Medications











 Medication  Instructions  Recorded  Confirmed


 


Aspirin [Adult Low Dose Aspirin EC] 81 mg PO DAILY 12/10/18 12/12/18


 


Ibuprofen [Motrin Ib] 400 mg PO AS DIRECTED PRN 12/10/18 12/12/18


 


Simvastatin [Zocor] 40 mg PO DAILY 12/10/18 12/12/18


 


Telmisartan [Micardis] 80 mg PO DAILY 12/10/18 12/12/18


 


amLODIPine [Norvasc] 5 mg PO AC-LUNCH 12/10/18 12/12/18


 


glipiZIDE [Glucotrol] 10 mg PO 1130,1830 12/10/18 12/12/18


 


sitaGLIPtin [Januvia] 50 mg PO AC-LUNCH 12/10/18 12/12/18











                                    Allergies











Allergy/AdvReac Type Severity Reaction Status Date / Time


 


Fish Containing Products Allergy  Swelling Verified 03/14/19 19:01





[Fish]     


 


Sulfa (Sulfonamide Allergy  Unknown Verified 03/14/19 19:01





Antibiotics)   Childhood  














Review of Systems


ROS Statement: 


Those systems with pertinent positive or pertinent negative responses have been 

documented in the HPI.





ROS Other: All systems not noted in ROS Statement are negative.





Past Medical History


Past Medical History: Cancer, Heart Failure, Diabetes Mellitus, Hypertension, 

Prostate Disorder


Additional Past Medical History / Comment(s): PROSTATE


History of Any Multi-Drug Resistant Organisms: None Reported


Past Surgical History: Orthopedic Surgery, Prostate Surgery


Additional Past Surgical History / Comment(s): RIGHT ANKLE, RIGHT LEG, RIGHT 

CATARACT


Past Anesthesia/Blood Transfusion Reactions: No Reported Reaction


Past Psychological History: No Psychological Hx Reported


Smoking Status: Never smoker


Past Alcohol Use History: Rare


Past Drug Use History: None Reported





- Past Family History


  ** Mother


Family Medical History: No Reported History





General Exam


Limitations: no limitations


General appearance: alert, in no apparent distress, anxious


Head exam: Present: atraumatic, normocephalic, normal inspection


Eye exam: Present: normal appearance, PERRL, EOMI.  Absent: scleral icterus, 

conjunctival injection, periorbital swelling


ENT exam: Present: normal exam, mucous membranes moist


Neck exam: Present: normal inspection.  Absent: tenderness, meningismus, 

lymphadenopathy


Respiratory exam: Present: normal lung sounds bilaterally, wheezes, rales, 

accessory muscle use, decreased breath sounds, prolonged expiratory.  Absent: 

respiratory distress, rhonchi, stridor


Cardiovascular Exam: Present: normal rhythm, tachycardia, normal heart sounds.  

Absent: systolic murmur, diastolic murmur, rubs, gallop, clicks


GI/Abdominal exam: Present: soft, normal bowel sounds.  Absent: distended, 

tenderness, guarding, rebound, rigid


Extremities exam: Present: normal inspection, full ROM, normal capillary refill.

  Absent: tenderness, pedal edema, joint swelling, calf tenderness


Back exam: Present: normal inspection


Neurological exam: Present: alert, oriented X3, CN II-XII intact


Psychiatric exam: Present: normal affect, normal mood


Skin exam: Present: warm, dry, intact, normal color.  Absent: rash





Course


                                   Vital Signs











  03/14/19 03/14/19 03/14/19





  19:01 19:20 19:40


 


Temperature 97.9 F  


 


Pulse Rate 110 H 89 91


 


Respiratory 24 22 20





Rate   


 


Blood Pressure 138/81  


 


O2 Sat by Pulse 95  





Oximetry   














- Reevaluation(s)


Reevaluation #1: 





03/14/19 20:07


Medical record is reviewed


Reevaluation #2: 





03/14/19 20:07


Patient has no significant improvement after breathing treatment





Medical Decision Making





- Medical Decision Making





73 male to be evaluated.  Patient does say for evaluation regards to shortness 

of breath.  Patient has significant CHF exacerbation.  Would be admitted for 

cardiac observation





- Lab Data


Result diagrams: 


                                 03/14/19 19:18





                                 03/14/19 19:18


                                   Lab Results











  03/14/19 03/14/19 03/14/19 Range/Units





  19:18 19:18 19:18 


 


WBC  6.4    (3.8-10.6)  k/uL


 


RBC  3.96 L    (4.30-5.90)  m/uL


 


Hgb  12.7 L    (13.0-17.5)  gm/dL


 


Hct  39.2    (39.0-53.0)  %


 


MCV  99.2    (80.0-100.0)  fL


 


MCH  32.2    (25.0-35.0)  pg


 


MCHC  32.4    (31.0-37.0)  g/dL


 


RDW  14.3    (11.5-15.5)  %


 


Plt Count  163    (150-450)  k/uL


 


Neutrophils %  71    %


 


Lymphocytes %  17    %


 


Monocytes %  7    %


 


Eosinophils %  2    %


 


Basophils %  0    %


 


Neutrophils #  4.6    (1.3-7.7)  k/uL


 


Lymphocytes #  1.1    (1.0-4.8)  k/uL


 


Monocytes #  0.5    (0-1.0)  k/uL


 


Eosinophils #  0.2    (0-0.7)  k/uL


 


Basophils #  0.0    (0-0.2)  k/uL


 


Hypochromasia  Slight    


 


Macrocytosis  Slight    


 


PT    12.2 H  (9.0-12.0)  sec


 


INR    1.2 H  (<1.2)  


 


APTT    22.8  (22.0-30.0)  sec


 


D-Dimer    1.07 H  (<0.60)  mg/L FEU


 


Sodium   137   (137-145)  mmol/L


 


Potassium   4.5   (3.5-5.1)  mmol/L


 


Chloride   101   ()  mmol/L


 


Carbon Dioxide   26   (22-30)  mmol/L


 


Anion Gap   10   mmol/L


 


BUN   38 H   (9-20)  mg/dL


 


Creatinine   2.25 H   (0.66-1.25)  mg/dL


 


Est GFR (CKD-EPI)AfAm   33   (>60 ml/min/1.73 sqM)  


 


Est GFR (CKD-EPI)NonAf   28   (>60 ml/min/1.73 sqM)  


 


Glucose   268 H   (74-99)  mg/dL


 


Calcium   9.3   (8.4-10.2)  mg/dL


 


Magnesium   2.1   (1.6-2.3)  mg/dL


 


Total Bilirubin   0.8   (0.2-1.3)  mg/dL


 


AST   24   (17-59)  U/L


 


ALT   48   (21-72)  U/L


 


Alkaline Phosphatase   106   ()  U/L


 


Total Protein   6.4   (6.3-8.2)  g/dL


 


Albumin   3.8   (3.5-5.0)  g/dL














- EKG Data


-: EKG Interpreted by Me (EKG shows sinus tachycardia rate of 103, , QRS 

72, QTc 468)





- Radiology Data


Radiology results: report reviewed (chest x-ray positive for CHF), image 

reviewed





Disposition


Clinical Impression: 


 Congestive heart failure, Acute pulmonary edema





Disposition: ADMITTED AS IP TO THIS HOSP


Condition: Fair


Is patient prescribed a controlled substance at d/c from ED?: No


Referrals: 


Rosangela Busby MD [Primary Care Provider] - 1-2 days

## 2019-03-14 NOTE — P.HPIM
History of Present Illness


H&P Date: 03/14/19


The patient is a 73-year-old male with a PMH of type II DM, hypertension, 

hyperlipidemia, and CKD stage III who presented to the ED for gradually 

worsening shortness of breath.  The patient was accompanied by his wife who is a

retired RN and supplemented the history.  He notes that over the past few weeks,

he noticed worsening exercise tolerance, and increasing lower extremity edema.  

He would get short of breath with minimal exertion and endorsed a nonproductive 

cough along with orthopnea, and currently sleeps with 3 pillows.  The patient 

notes that he has never had such symptoms before and was never diagnosed with 

CHF.  He otherwise denied fever, chills, calf pain, recent travel, nausea, vo

miting, chest pain, palpitations, sick contacts, headache, visual changes, 

abdominal pain, or diarrhea.





In the emergency room, the patient underwent a comprehensive workup, with WBC 

count 6.4, hemoglobin 12.7, troponins elevated at 0.048, BNP significantly 

elevated at 32,700, BUN 38, and creatinine 2.25 with a baseline of 2.3.  Chest 

x-ray revealed cardiomegaly with pulmonary venous congestion. 





Review of Systems





Pertinent positives and negatives as discussed in HPI, a complete review of 

systems was performed and all other systems are negative.





Past Medical History


Past Medical History: Cancer, Diabetes Mellitus, Hyperlipidemia, Hypertension, 

Prostate Disorder, Renal Disease


Additional Past Medical History / Comment(s): Prostate cancer with surgery in 

2008, Patient states he has issues with his kidney's and has been seeing doctor 

Devendra since December 2018.


History of Any Multi-Drug Resistant Organisms: None Reported


Past Surgical History: Orthopedic Surgery, Prostate Surgery


Additional Past Surgical History / Comment(s): RIGHT ANKLE 2010, Bilateral 

Cataract 2017, Colonoscopy December 2018, Prostate surgery 2008


Past Anesthesia/Blood Transfusion Reactions: No Reported Reaction


Past Psychological History: No Psychological Hx Reported


Smoking Status: Never smoker


Past Alcohol Use History: Rare


Past Drug Use History: None Reported





- Past Family History


  ** Mother


Family Medical History: No Reported History


Additional Family Medical History / Comment(s): CHF





  ** Father


Family Medical History: No Reported History


Additional Family Medical History / Comment(s): Patient states father passed 

away from a massive heart attack.





Medications and Allergies


                                Home Medications











 Medication  Instructions  Recorded  Confirmed  Type


 


Simvastatin [Zocor] 40 mg PO DAILY 12/10/18 03/14/19 History


 


amLODIPine [Norvasc] 5 mg PO DAILY 12/10/18 03/14/19 History


 


glipiZIDE [Glucotrol] 10 mg PO BID 12/10/18 03/14/19 History


 


sitaGLIPtin [Januvia] 50 mg PO DAILY 12/10/18 03/14/19 History


 


Ergocalciferol [Vitamin D2] 50,000 unit PO Q14D 03/14/19 03/14/19 History


 


Sodium Chloride [Saline Nasal 1 spray EA NOSTRIL DAILY PRN 03/14/19 03/14/19 

History





Spray]    


 


Telmisartan [Micardis] 40 mg PO DAILY 03/14/19 03/14/19 History








                                    Allergies











Allergy/AdvReac Type Severity Reaction Status Date / Time


 


Fish Containing Products Allergy  Swelling Verified 03/14/19 20:24





[Fish]     


 


Sulfa (Sulfonamide Allergy  Unknown Verified 03/14/19 20:24





Antibiotics)   Childhood  














Physical Exam


Vitals: 


                                   Vital Signs











  Temp Pulse Resp BP Pulse Ox


 


 03/14/19 22:18   105 H  28 H  171/95  96


 


 03/14/19 21:10   105 H  18  145/93  96


 


 03/14/19 19:40   91  20  


 


 03/14/19 19:20   89  22  


 


 03/14/19 19:01  97.9 F  110 H  24  138/81  95








                                Intake and Output











 03/14/19 03/14/19 03/14/19





 06:59 14:59 22:59


 


Other:   


 


  Weight   70.307 kg














General: non toxic, in mild distress, appears at stated age, normal weight


Derm: no unusual rashes/lesions no unusual ecchymoses, warm, dry


Head: atraumatic, normocephalic, symmetric


Eyes: EOMI, no lid lag, anicteric sclera, pupils equal round reactive to light


ENT: Nose and ears atraumatic, no thrush,  no pharyngeal erythema


Neck: No thyromegaly, no cervical lymphadenopathy, trachea midline, supple


Mouth: no lip lesion, mucus membranes moist


Cardiovascular: S1S2 reg, no murmur, positive posterior tibial pulse bilateral, 

2+ pitting edema olimpia to thighs, capillary refill less than 2 seconds


Lungs: Bilateral rales diffusely, no ronchi or other coarse breath sounds 

appreciated, no wheezing, no accessory muscle use


Abdominal: soft,  nontender to palpation, no guarding, no appreciable 

organomegaly, normal bowel sounds


Ext: no gross muscle atrophy,  muscle strength 5 out of 5 in all 4 extremities 

grossly, no contractures, 


Neuro:  CN II-XI grossly intact, light touch intact all 4 extremities, finger to

 nose within normal limits,


Psych: Alert, oriented, appropriate affect 





Results


CBC & Chem 7: 


                                 03/14/19 19:18





                                 03/14/19 19:18


Labs: 


                  Abnormal Lab Results - Last 24 Hours (Table)











  03/14/19 03/14/19 03/14/19 Range/Units





  19:18 19:18 19:18 


 


RBC  3.96 L    (4.30-5.90)  m/uL


 


Hgb  12.7 L    (13.0-17.5)  gm/dL


 


PT    12.2 H  (9.0-12.0)  sec


 


INR    1.2 H  (<1.2)  


 


D-Dimer    1.07 H  (<0.60)  mg/L FEU


 


BUN   38 H   (9-20)  mg/dL


 


Creatinine   2.25 H   (0.66-1.25)  mg/dL


 


Glucose   268 H   (74-99)  mg/dL


 


POC Glucose (mg/dL)     (75-99)  mg/dL


 


Troponin I     (0.000-0.034)  ng/mL














  03/14/19 03/14/19 Range/Units





  19:18 22:49 


 


RBC    (4.30-5.90)  m/uL


 


Hgb    (13.0-17.5)  gm/dL


 


PT    (9.0-12.0)  sec


 


INR    (<1.2)  


 


D-Dimer    (<0.60)  mg/L FEU


 


BUN    (9-20)  mg/dL


 


Creatinine    (0.66-1.25)  mg/dL


 


Glucose    (74-99)  mg/dL


 


POC Glucose (mg/dL)   298 H  (75-99)  mg/dL


 


Troponin I  0.048 H*   (0.000-0.034)  ng/mL














Thrombosis Risk Factor Assmnt





- Choose All That Apply


Any of the Below Risk Factors Present?: Yes


Each Factor Represents 1 point: Heart failure (<1month), Swollen legs (current)


Other Risk Factors: Yes


Each Risk Factor Represents 2 Points: Age 61-74 years


Thrombosis Risk Factor Assessment Total Risk Factor Score: 4


Thrombosis Risk Factor Assessment Level: Moderate Risk





Assessment and Plan


Plan: 





Fluid overload, likely newly diagnosed CHF, in acute exacerbation


-CXR w/ cardiomegaly, BNP elevated


-Order Echocardiogram


-Cardiology consult


-Lasix 40 mg IV every 8 hourly


-Cardiac monitoring


-Intake and output


-Daily weights


-Daily BMP





Troponin elevation, likely in setting of acute CHF exacerbation


-Trend troponin


-Cardiac monitoring


-Cardiology consult





CKD stage III


-Monitor BMP


-Avoid nephrotoxic agents





Diabetes mellitus


-Lispro insulin sliding scale w/ fingersticks


-Levemir 10 U qhs





Hypertension


-Resume home meds





Hyperlipidemia


-Resume home meds





DVT//GI prophylaxis


-Heparin


-No indication for GI prophy.





The patient is admitted with an anticipated less than 2 midnight stay for 

evaluation of acute CHF exacerbation.


CODE STATUS: Full Code


Discussed with: Patient, daughter


Anticipated discharge date: 3/16/19


Anticipated discharge place: Home


A total of 55 minutes was spent on the care of this complex patient more than 

50% of the time was spent in counseling and care coordination.

## 2019-03-15 LAB
ANION GAP SERPL CALC-SCNC: 9 MMOL/L
BUN SERPL-SCNC: 39 MG/DL (ref 9–20)
CALCIUM SPEC-MCNC: 9.6 MG/DL (ref 8.4–10.2)
CHLORIDE SERPL-SCNC: 104 MMOL/L (ref 98–107)
CO2 SERPL-SCNC: 28 MMOL/L (ref 22–30)
ERYTHROCYTE [DISTWIDTH] IN BLOOD BY AUTOMATED COUNT: 3.95 M/UL (ref 4.3–5.9)
ERYTHROCYTE [DISTWIDTH] IN BLOOD: 14 % (ref 11.5–15.5)
GLUCOSE BLD-MCNC: 169 MG/DL (ref 75–99)
GLUCOSE BLD-MCNC: 206 MG/DL (ref 75–99)
GLUCOSE BLD-MCNC: 234 MG/DL (ref 75–99)
GLUCOSE BLD-MCNC: 278 MG/DL (ref 75–99)
GLUCOSE BLD-MCNC: 57 MG/DL (ref 75–99)
GLUCOSE BLD-MCNC: 75 MG/DL (ref 75–99)
GLUCOSE BLD-MCNC: 80 MG/DL (ref 75–99)
GLUCOSE SERPL-MCNC: 100 MG/DL (ref 74–99)
HCT VFR BLD AUTO: 39.7 % (ref 39–53)
HGB BLD-MCNC: 12.1 GM/DL (ref 13–17.5)
MCH RBC QN AUTO: 30.6 PG (ref 25–35)
MCHC RBC AUTO-ENTMCNC: 30.4 G/DL (ref 31–37)
MCV RBC AUTO: 100.5 FL (ref 80–100)
PLATELET # BLD AUTO: 142 K/UL (ref 150–450)
POTASSIUM SERPL-SCNC: 4.6 MMOL/L (ref 3.5–5.1)
SODIUM SERPL-SCNC: 141 MMOL/L (ref 137–145)
WBC # BLD AUTO: 6.7 K/UL (ref 3.8–10.6)

## 2019-03-15 RX ADMIN — INSULIN ASPART SCH UNIT: 100 INJECTION, SOLUTION INTRAVENOUS; SUBCUTANEOUS at 17:24

## 2019-03-15 RX ADMIN — FUROSEMIDE SCH MG: 10 INJECTION, SOLUTION INTRAMUSCULAR; INTRAVENOUS at 16:13

## 2019-03-15 RX ADMIN — FUROSEMIDE SCH MG: 10 INJECTION, SOLUTION INTRAMUSCULAR; INTRAVENOUS at 09:00

## 2019-03-15 RX ADMIN — ASPIRIN 325 MG ORAL TABLET SCH MG: 325 PILL ORAL at 09:00

## 2019-03-15 RX ADMIN — HEPARIN SODIUM SCH UNIT: 5000 INJECTION, SOLUTION INTRAVENOUS; SUBCUTANEOUS at 16:13

## 2019-03-15 RX ADMIN — HEPARIN SODIUM SCH UNIT: 5000 INJECTION, SOLUTION INTRAVENOUS; SUBCUTANEOUS at 09:00

## 2019-03-15 RX ADMIN — ATORVASTATIN CALCIUM SCH MG: 20 TABLET, FILM COATED ORAL at 09:00

## 2019-03-15 RX ADMIN — INSULIN ASPART SCH: 100 INJECTION, SOLUTION INTRAVENOUS; SUBCUTANEOUS at 21:41

## 2019-03-15 RX ADMIN — INSULIN ASPART SCH UNIT: 100 INJECTION, SOLUTION INTRAVENOUS; SUBCUTANEOUS at 06:24

## 2019-03-15 RX ADMIN — INSULIN DETEMIR SCH: 100 INJECTION, SOLUTION SUBCUTANEOUS at 21:52

## 2019-03-15 RX ADMIN — FUROSEMIDE SCH MG: 10 INJECTION, SOLUTION INTRAMUSCULAR; INTRAVENOUS at 23:17

## 2019-03-15 RX ADMIN — ISOSORBIDE MONONITRATE SCH MG: 30 TABLET, EXTENDED RELEASE ORAL at 12:32

## 2019-03-15 RX ADMIN — DOBUTAMINE HYDROCHLORIDE SCH MLS/HR: 200 INJECTION INTRAVENOUS at 11:34

## 2019-03-15 RX ADMIN — INSULIN ASPART SCH: 100 INJECTION, SOLUTION INTRAVENOUS; SUBCUTANEOUS at 11:41

## 2019-03-15 RX ADMIN — HEPARIN SODIUM SCH UNIT: 5000 INJECTION, SOLUTION INTRAVENOUS; SUBCUTANEOUS at 23:17

## 2019-03-15 NOTE — ECHOF
Referral Reason:CHF



MEASUREMENTS

--------

HEIGHT: 165.1 cm

WEIGHT: 66.7 kg

BP: 134/84

RVIDd:   3.4 cm     (< 3.3)

IVSd:   1.2 cm     (0.6 - 1.1)

LVIDd:   5.1 cm     (3.9 - 5.3)

LVPWd:   1.0 cm     (0.6 - 1.1)

IVSs:   1.1 cm

LVIDs:   4.5 cm

LVPWs:   1.3 cm

LA Diam:   4.3 cm     (2.7 - 3.8)

LAESV Index (A-L):   41.84 ml/m

Ao Diam:   3.2 cm     (2.0 - 3.7)

AV Cusp:   1.7 cm     (1.5 - 2.6)

MV EXCURSION:   13.883 mm     (> 18.000)

MV EF SLOPE:   56 mm/s     (70 - 150)

EPSS:   3.1 cm

RAP:   15.00 mmHg

RVSP:   61.60 mmHg







FINDINGS

--------

Sinus rhythm.

This was a technically good study.

The left ventricular size is normal.   There is borderline concentric left ventricular hypertrophy.  
 Overall left ventricular systolic function is severely impaired with, an EF < 20%.

The right ventricle is mildly enlarged.   The right ventricular systolic function is moderately impai
red.

LA is severely dilated >40 ml/m2

The right atrium is normal in size.

Aortic valve is trileaflet and is mildly thickened.

The mitral valve leaflets are mildly thickened.   Mild-to-moderate mitral regurgitation is present.

Moderate tricuspid regurgitation present.   There is moderate pulmonary hypertension.   The right charu
tricular systolic pressure, as measured by Doppler, is 61.60mmHg.

Trace/mild (physiologic)  pulmonic regurgitation.

The aortic root size is normal.

The inferior vena cava is dilated with no significant inspiratory collapse which is consistent estima
louise right atrial pressure of >15 mmHg.

There is a moderate, generalized pericardial effusion present.   There is no evidence of cardiac tamp
onade.   Pleural Effusion with Fibrin.



CONCLUSIONS

--------

1. Sinus rhythm.

2. This was a technically good study.

3. The left ventricular size is normal.

4. There is borderline concentric left ventricular hypertrophy.

5. Overall left ventricular systolic function is severely impaired with, an EF < 20%.

6. The right ventricle is mildly enlarged.

7. The right ventricular systolic function is moderately impaired.

8. LA is severely dilated >40 ml/m2

9. The right atrium is normal in size.

10. Aortic valve is trileaflet and is mildly thickened.

11. The mitral valve leaflets are mildly thickened.

12. Mild-to-moderate mitral regurgitation is present.

13. Moderate tricuspid regurgitation present.

14. There is moderate pulmonary hypertension.

15. Trace/mild (physiologic)  pulmonic regurgitation.

16. The aortic root size is normal.

17. The inferior vena cava is dilated with no significant inspiratory collapse which is consistent es
timated right atrial pressure of >15 mmHg.

18. There is a moderate, generalized pericardial effusion present.

19. There is no evidence of cardiac tamponade.

20. Pleural Effusion with Fibrin.





SONOGRAPHER: Melyssa Negron RDCS

## 2019-03-15 NOTE — P.PN
Subjective


Progress Note Date: 03/15/19


Principal diagnosis: 





CHF exacerbation, new diagnosis





Patient was seen and examined.  No acute events overnight.  Patient reports a 

slight improvement in his breathing but continues to complain of dyspnea with 

ambulation.  He continues to complain of lower extremity swelling.  His wife is 

at bedside.





Objective





- Vital Signs


Vital signs: 


                                   Vital Signs











Temp  96.4 F L  03/15/19 09:04


 


Pulse  94   03/15/19 09:04


 


Resp  18   03/15/19 09:04


 


BP  135/75   03/15/19 09:04


 


Pulse Ox  94 L  03/15/19 09:04








                                 Intake & Output











 03/14/19 03/15/19 03/15/19





 18:59 06:59 18:59


 


Intake Total   0


 


Output Total  750 490


 


Balance  -750 -490


 


Weight  67 kg 67 kg


 


Intake:   


 


  Oral   0


 


Output:   


 


  Urine  750 490


 


Other:   


 


  Voiding Method  Urinal Urinal





  Diaper Diaper





  Incontinent Incontinent


 


  # Voids  3 














- Exam





General: [non toxic], [mild distress], [appears at stated age]


Derm: [warm], [dry]


Head: [atraumatic], [normocephalic], [symmetric]


Eyes: [EOMI], [no lid lag], [anicteric sclera]


Mouth: [no lip lesion], [mucus membranes moist]


Cardiovascular: [S1S2 reg], [no murmur], [positive posterior DP bilateral]


Lungs: [Decreased breath sounds bilateral], [no rhonchi, no rales] , [no 

accessory muscle use]


Abdominal: [soft], [ nontender to palpation], [no guarding], [no appreciable 

organomegaly]


Ext: [no gross muscle atrophy], [2+ bilateral lower extremity edema], [no 

contractures]


Neuro:  [no focal neuro deficits]


Psych: [Alert], [oriented], [appropriate affect] 





- Labs


CBC & Chem 7: 


                                 03/15/19 06:42





                                 03/15/19 06:42


Labs: 


                  Abnormal Lab Results - Last 24 Hours (Table)











  03/14/19 03/14/19 03/14/19 Range/Units





  19:18 19:18 19:18 


 


RBC  3.96 L    (4.30-5.90)  m/uL


 


Hgb  12.7 L    (13.0-17.5)  gm/dL


 


MCV     (80.0-100.0)  fL


 


MCHC     (31.0-37.0)  g/dL


 


Plt Count     (150-450)  k/uL


 


PT    12.2 H  (9.0-12.0)  sec


 


INR    1.2 H  (<1.2)  


 


D-Dimer    1.07 H  (<0.60)  mg/L FEU


 


BUN   38 H   (9-20)  mg/dL


 


Creatinine   2.25 H   (0.66-1.25)  mg/dL


 


Glucose   268 H   (74-99)  mg/dL


 


POC Glucose (mg/dL)     (75-99)  mg/dL


 


Troponin I     (0.000-0.034)  ng/mL


 


Urine Protein     (Negative)  


 


Urine Glucose (UA)     (Negative)  


 


Urine Blood     (Negative)  














  03/14/19 03/14/19 03/14/19 Range/Units





  19:18 22:49 23:26 


 


RBC     (4.30-5.90)  m/uL


 


Hgb     (13.0-17.5)  gm/dL


 


MCV     (80.0-100.0)  fL


 


MCHC     (31.0-37.0)  g/dL


 


Plt Count     (150-450)  k/uL


 


PT     (9.0-12.0)  sec


 


INR     (<1.2)  


 


D-Dimer     (<0.60)  mg/L FEU


 


BUN     (9-20)  mg/dL


 


Creatinine     (0.66-1.25)  mg/dL


 


Glucose     (74-99)  mg/dL


 


POC Glucose (mg/dL)   298 H   (75-99)  mg/dL


 


Troponin I  0.048 H*    (0.000-0.034)  ng/mL


 


Urine Protein    1+ H  (Negative)  


 


Urine Glucose (UA)    Trace H  (Negative)  


 


Urine Blood    Trace H  (Negative)  














  03/15/19 03/15/19 03/15/19 Range/Units





  00:45 02:12 06:10 


 


RBC     (4.30-5.90)  m/uL


 


Hgb     (13.0-17.5)  gm/dL


 


MCV     (80.0-100.0)  fL


 


MCHC     (31.0-37.0)  g/dL


 


Plt Count     (150-450)  k/uL


 


PT     (9.0-12.0)  sec


 


INR     (<1.2)  


 


D-Dimer     (<0.60)  mg/L FEU


 


BUN     (9-20)  mg/dL


 


Creatinine     (0.66-1.25)  mg/dL


 


Glucose     (74-99)  mg/dL


 


POC Glucose (mg/dL)   278 H  169 H  (75-99)  mg/dL


 


Troponin I  0.060 H*    (0.000-0.034)  ng/mL


 


Urine Protein     (Negative)  


 


Urine Glucose (UA)     (Negative)  


 


Urine Blood     (Negative)  














  03/15/19 03/15/19 03/15/19 Range/Units





  06:42 06:42 06:42 


 


RBC   3.95 L   (4.30-5.90)  m/uL


 


Hgb   12.1 L   (13.0-17.5)  gm/dL


 


MCV   100.5 H   (80.0-100.0)  fL


 


MCHC   30.4 L   (31.0-37.0)  g/dL


 


Plt Count   142 L   (150-450)  k/uL


 


PT     (9.0-12.0)  sec


 


INR     (<1.2)  


 


D-Dimer     (<0.60)  mg/L FEU


 


BUN    39 H  (9-20)  mg/dL


 


Creatinine    2.34 H  (0.66-1.25)  mg/dL


 


Glucose    100 H  (74-99)  mg/dL


 


POC Glucose (mg/dL)     (75-99)  mg/dL


 


Troponin I  0.131 H*    (0.000-0.034)  ng/mL


 


Urine Protein     (Negative)  


 


Urine Glucose (UA)     (Negative)  


 


Urine Blood     (Negative)  














  03/15/19 03/15/19 Range/Units





  11:35 11:52 


 


RBC    (4.30-5.90)  m/uL


 


Hgb    (13.0-17.5)  gm/dL


 


MCV    (80.0-100.0)  fL


 


MCHC    (31.0-37.0)  g/dL


 


Plt Count    (150-450)  k/uL


 


PT    (9.0-12.0)  sec


 


INR    (<1.2)  


 


D-Dimer    (<0.60)  mg/L FEU


 


BUN    (9-20)  mg/dL


 


Creatinine    (0.66-1.25)  mg/dL


 


Glucose    (74-99)  mg/dL


 


POC Glucose (mg/dL)  55 L  57 L  (75-99)  mg/dL


 


Troponin I    (0.000-0.034)  ng/mL


 


Urine Protein    (Negative)  


 


Urine Glucose (UA)    (Negative)  


 


Urine Blood    (Negative)  














Assessment and Plan


Assessment: 





Assessment and Plan





1.  CHF exacerbation


2.  Troponin elevation


3.  CKG stage III


4.  Diabetes mellitus


5.  Hypertension


6.  DVT and GI prophylaxis





1.  BNP elevation to 32,700.  Chest x-ray shows cardiomegaly and mild heart 

failure.  Venous duplex is negative for DVT.  Start diuresis with Lasix 40 mg IV

3 times a day.  As per cardiology, EF is less than 20%, started on dobutamine 

drip.  Continue hydralazine and Imdur.  Strict in's and outs.  Daily weights.  

Telemetry monitoring.  Will follow cardiology recommendations.  Will follow 

echocardiogram results.


2.  Troponin 0.048, 0.1312 with EKG showing sinus tachycardia and low voltage 

QRS.  Likely leak from CHF.  Telemetry monitoring.  Continue aspirin and 

Lipitor.  Will follow cardiology recommendations.


3.  BUN 39, creatinine 2.34.  Avoid nephrotoxins.  Monitor and replace 

electrolytes.  We'll avoid IVF due to fluid overloaded state.  We'll follow 

nephrology recommendations.


4.  Point-of-care glucose.  Continue Levemir 10 units at bedtime.  Regular Accu-

Cheks.  Hypoglycemic precautions.


5.  BP.  Continue hydralazine 25 mg by mouth twice a day, Imdur 15 mg by mouth 

daily.  Monitor vitals, adjust medications as necessary.


6.  Heparin 5000 units subcutaneously 3 times a day.





Patient made a for CHF exacerbation, IV diuresing.  Cardiology is on board.  

Nephrology consulted for CKD.  Patient is pending clinical improvement.

## 2019-03-15 NOTE — P.CRDCN
History of Present Illness


Consult date: 03/15/19


History of present illness: 


This is a 73-year-old gentleman with history of type 2 diabetes mellitus, 

hypertension, hyperlipidemia and also renal failure who was admitted to the 

hospital with complaints of increasing shortness of breath and swelling in the 

legs.  Denied any chest pain.  It appears that patient has been short of breath 

with minimal exertion and also has been using 3 pillows at night.  He denied any

fever or chills.  His EKG showed sinus rhythm with evidence of old anteroseptal 

myocardial infarction.  Chest x-ray showed cardiomegaly and evidence of CHF.  

His proBNP is elevated.  His echocardiogram showed some generalized hypokinesia 

of severe degree with akinesis and thinning of the anteroapical segments 

suggestive of previous myocardial infarction.  No significant valvular 

abnormalities are noted.  There is moderate pericardial effusion.  His 

creatinine has been going up gradually and is up to more than 2 at this time.  

Patient has been IV diuretics.  I'm going to start him on by mouth hydralazine 

and Imdur and also IV dobutamine along with Lasix.  Nephrology consult is 

requested.  Further recommendations depend upon the clinical course.  Prognosis 

is guarded.  His ejection fraction is about less than 20%








Review of Systems





As per the chart





Past Medical History


Past Medical History: Cancer, Diabetes Mellitus, Hyperlipidemia, Hypertension, 

Prostate Disorder, Renal Disease


Additional Past Medical History / Comment(s): Prostate cancer with surgery in 

2008, Patient states he has issues with his kidney's and has been seeing doctor 

Devendra since December 2018.


History of Any Multi-Drug Resistant Organisms: None Reported


Past Surgical History: Orthopedic Surgery, Prostate Surgery


Additional Past Surgical History / Comment(s): RIGHT ANKLE 2010, Bilateral 

Cataract 2017, Colonoscopy December 2018, Prostate surgery 2008


Past Anesthesia/Blood Transfusion Reactions: No Reported Reaction


Past Psychological History: No Psychological Hx Reported


Smoking Status: Never smoker


Past Alcohol Use History: Rare


Past Drug Use History: None Reported





- Past Family History


  ** Mother


Family Medical History: No Reported History


Additional Family Medical History / Comment(s): CHF





  ** Father


Family Medical History: No Reported History


Additional Family Medical History / Comment(s): Patient states father passed 

away from a massive heart attack.





Medications and Allergies


                                Home Medications











 Medication  Instructions  Recorded  Confirmed  Type


 


Simvastatin [Zocor] 40 mg PO DAILY 12/10/18 03/14/19 History


 


amLODIPine [Norvasc] 5 mg PO DAILY 12/10/18 03/14/19 History


 


glipiZIDE [Glucotrol] 10 mg PO BID 12/10/18 03/14/19 History


 


sitaGLIPtin [Januvia] 50 mg PO DAILY 12/10/18 03/14/19 History


 


Ergocalciferol [Vitamin D2] 50,000 unit PO Q14D 03/14/19 03/14/19 History


 


Sodium Chloride [Saline Nasal 1 spray EA NOSTRIL DAILY PRN 03/14/19 03/14/19 

History





Spray]    


 


Telmisartan [Micardis] 40 mg PO DAILY 03/14/19 03/14/19 History








                                    Allergies











Allergy/AdvReac Type Severity Reaction Status Date / Time


 


Fish Containing Products Allergy  Swelling Verified 03/14/19 20:24





[Fish]     


 


Sulfa (Sulfonamide Allergy  Unknown Verified 03/14/19 20:24





Antibiotics)   Childhood  














Physical Exam


Vitals: 


                                   Vital Signs











  Temp Pulse Pulse Resp BP BP Pulse Ox


 


 03/15/19 09:04  96.4 F L   94  18   135/75  94 L


 


 03/15/19 03:12  97.4 F L   103 H  19   134/84  94 L


 


 03/15/19 00:00  98.2 F   104 H  18   140/89  96


 


 03/14/19 22:18   105 H   28 H  171/95   96


 


 03/14/19 21:10   105 H   18  145/93   96


 


 03/14/19 19:40   91   20   


 


 03/14/19 19:20   89   22   


 


 03/14/19 19:01  97.9 F  110 H   24  138/81   95








                                Intake and Output











 03/14/19 03/15/19 03/15/19





 22:59 06:59 14:59


 


Intake Total   0


 


Output Total 150 600 


 


Balance -150 -600 0


 


Intake:   


 


  Oral   0


 


Output:   


 


  Urine 150 600 


 


Other:   


 


  Voiding Method  Urinal Urinal





  Diaper Diaper





  Incontinent Incontinent


 


  # Voids  3 


 


  Weight 70.307 kg 67 kg 67 kg














GENERAL EXAM: Patient is alert and oriented and in mild to moderate respiratory 

distress


HEENT: Normocephalic. Normal reaction of pupils, equal size, normal range of 

extraocular motion. No erythema or exudates in the throat.


NECK: No masses, no nuchal rigidity.  Increased JVD


CHEST: No chest wall deformity.


LUNGS: Diminished breath sounds at bases


HEART: S1 and S2 normal 


ABDOMEN: No hepatosplenomegaly, normal bowel sounds, no guarding or rigidity.


SKIN: No rashes


CENTRAL NERVOUS SYSTEM: No focal deficits.


EXTREMITIES: Significant bilateral edema





Results





                                 03/15/19 06:42





                                 03/15/19 06:42


                                 Cardiac Enzymes











  03/14/19 03/14/19 03/15/19 Range/Units





  19:18 19:18 00:45 


 


AST  24    (17-59)  U/L


 


Troponin I   0.048 H*  0.060 H*  (0.000-0.034)  ng/mL














  03/15/19 Range/Units





  06:42 


 


AST   (17-59)  U/L


 


Troponin I  0.131 H*  (0.000-0.034)  ng/mL








                                   Coagulation











  03/14/19 Range/Units





  19:18 


 


PT  12.2 H  (9.0-12.0)  sec


 


APTT  22.8  (22.0-30.0)  sec








                                       CBC











  03/14/19 03/15/19 Range/Units





  19:18 06:42 


 


WBC  6.4  6.7  (3.8-10.6)  k/uL


 


RBC  3.96 L  3.95 L  (4.30-5.90)  m/uL


 


Hgb  12.7 L  12.1 L  (13.0-17.5)  gm/dL


 


Hct  39.2  39.7  (39.0-53.0)  %


 


Plt Count  163  142 L  (150-450)  k/uL








                          Comprehensive Metabolic Panel











  03/14/19 03/15/19 Range/Units





  19:18 06:42 


 


Sodium  137  141  (137-145)  mmol/L


 


Potassium  4.5  4.6  (3.5-5.1)  mmol/L


 


Chloride  101  104  ()  mmol/L


 


Carbon Dioxide  26  28  (22-30)  mmol/L


 


BUN  38 H  39 H  (9-20)  mg/dL


 


Creatinine  2.25 H  2.34 H  (0.66-1.25)  mg/dL


 


Glucose  268 H  100 H  (74-99)  mg/dL


 


Calcium  9.3  9.6  (8.4-10.2)  mg/dL


 


AST  24   (17-59)  U/L


 


ALT  48   (21-72)  U/L


 


Alkaline Phosphatase  106   ()  U/L


 


Total Protein  6.4   (6.3-8.2)  g/dL


 


Albumin  3.8   (3.5-5.0)  g/dL








                               Current Medications











Generic Name Dose Route Start Last Admin





  Trade Name Freq  PRN Reason Stop Dose Admin


 


Amlodipine Besylate  5 mg  03/15/19 09:00  03/15/19 09:00





  Norvasc  PO   5 mg





  DAILY PENG   Administration


 


Aspirin  325 mg  03/15/19 09:00  03/15/19 09:00





  Aspirin  PO   325 mg





  DAILY PENG   Administration


 


Atorvastatin Calcium  20 mg  03/15/19 09:00  03/15/19 09:00





  Lipitor  PO   20 mg





  DAILY PENG   Administration


 


Furosemide  40 mg  03/15/19 09:00  03/15/19 09:00





  Lasix  IV   40 mg





  Q8H PENG   Administration


 


Heparin Sodium (Porcine)  5,000 unit  03/15/19 00:00  03/15/19 09:00





  Heparin  SQ   5,000 unit





  Q8HR PENG   Administration


 


Hydralazine HCl  25 mg  03/15/19 10:45 





  Apresoline  PO  





  BID PENG  


 


Dobutamine HCl/Dextrose 500 mg  250 mls @ 10.05 mls/hr  03/15/19 10:45 





  / IV Solution  IV  





  .Q24H PENG  





  5 MCG/KG/MIN  


 


Insulin Aspart  0 unit  03/15/19 07:30  03/15/19 06:24





  Novolog  SQ   2 unit





  ACHS PENG   Administration





  Protocol  


 


Insulin Detemir  10 unit  03/14/19 23:18  03/14/19 23:53





  Levemir  SQ   10 unit





  HS PENG   Administration


 


Isosorbide Mononitrate  15 mg  03/15/19 10:45 





  Imdur  PO  





  DAILY PENG  








                                Intake and Output











 03/14/19 03/15/19 03/15/19





 22:59 06:59 14:59


 


Intake Total   0


 


Output Total 150 600 


 


Balance -150 -600 0


 


Intake:   


 


  Oral   0


 


Output:   


 


  Urine 150 600 


 


Other:   


 


  Voiding Method  Urinal Urinal





  Diaper Diaper





  Incontinent Incontinent


 


  # Voids  3 


 


  Weight 70.307 kg 67 kg 67 kg








                                 Patient Weight











 03/16/19





 06:59


 


Weight 67 kg








                                        





                                 03/15/19 06:42 





                                 03/15/19 06:42 











EKG Interpretations (text)





Sinus rhythm with evidence of old myocardial infarction involving the 

anterolateral, apical area





Assessment and Plan


(1) Acute systolic (congestive) heart failure


Current Visit: Yes   Status: Acute   Code(s): I50.21 - ACUTE SYSTOLIC (CONGESTIV

E) HEART FAILURE   SNOMED Code(s): 028510655


   





(2) Ischemic cardiomyopathy


Current Visit: Yes   Status: Acute   Code(s): I25.5 - ISCHEMIC CARDIOMYOPATHY   

SNOMED Code(s): 333627828


   





(3) Chronic renal failure


Current Visit: Yes   Status: Acute   Code(s): N18.9 - CHRONIC KIDNEY DISEASE, 

UNSPECIFIED   SNOMED Code(s): 880033547


   





(4) Diabetes mellitus


Current Visit: Yes   Status: Acute   Code(s): E11.9 - TYPE 2 DIABETES MELLITUS 

WITHOUT COMPLICATIONS   SNOMED Code(s): 80231200


   


Plan: 


I'll continue with IV diuretics.  I will add IV dobutamine.  Renal consult.  

I'll put him on combination of hydralazine and Imdur.  Further recommendation 

depending upon the clinical course.

## 2019-03-16 LAB
ANION GAP SERPL CALC-SCNC: 5 MMOL/L
BUN SERPL-SCNC: 38 MG/DL (ref 9–20)
CALCIUM SPEC-MCNC: 8.6 MG/DL (ref 8.4–10.2)
CHLORIDE SERPL-SCNC: 101 MMOL/L (ref 98–107)
CO2 SERPL-SCNC: 32 MMOL/L (ref 22–30)
ERYTHROCYTE [DISTWIDTH] IN BLOOD BY AUTOMATED COUNT: 3.52 M/UL (ref 4.3–5.9)
ERYTHROCYTE [DISTWIDTH] IN BLOOD: 14.2 % (ref 11.5–15.5)
GLUCOSE BLD-MCNC: 125 MG/DL (ref 75–99)
GLUCOSE BLD-MCNC: 174 MG/DL (ref 75–99)
GLUCOSE BLD-MCNC: 181 MG/DL (ref 75–99)
GLUCOSE BLD-MCNC: 203 MG/DL (ref 75–99)
GLUCOSE SERPL-MCNC: 178 MG/DL (ref 74–99)
HCT VFR BLD AUTO: 35.1 % (ref 39–53)
HGB BLD-MCNC: 11.4 GM/DL (ref 13–17.5)
MCH RBC QN AUTO: 32.3 PG (ref 25–35)
MCHC RBC AUTO-ENTMCNC: 32.4 G/DL (ref 31–37)
MCV RBC AUTO: 99.7 FL (ref 80–100)
PLATELET # BLD AUTO: 131 K/UL (ref 150–450)
POTASSIUM SERPL-SCNC: 3.9 MMOL/L (ref 3.5–5.1)
SODIUM SERPL-SCNC: 138 MMOL/L (ref 137–145)
WBC # BLD AUTO: 6.1 K/UL (ref 3.8–10.6)

## 2019-03-16 RX ADMIN — ASPIRIN 325 MG ORAL TABLET SCH MG: 325 PILL ORAL at 08:03

## 2019-03-16 RX ADMIN — INSULIN DETEMIR SCH UNIT: 100 INJECTION, SOLUTION SUBCUTANEOUS at 21:11

## 2019-03-16 RX ADMIN — LISINOPRIL SCH MG: 5 TABLET ORAL at 21:04

## 2019-03-16 RX ADMIN — CARVEDILOL SCH MG: 6.25 TABLET, FILM COATED ORAL at 17:24

## 2019-03-16 RX ADMIN — HEPARIN SODIUM SCH UNIT: 5000 INJECTION, SOLUTION INTRAVENOUS; SUBCUTANEOUS at 08:02

## 2019-03-16 RX ADMIN — ATORVASTATIN CALCIUM SCH MG: 20 TABLET, FILM COATED ORAL at 08:03

## 2019-03-16 RX ADMIN — FUROSEMIDE SCH MG: 10 INJECTION, SOLUTION INTRAMUSCULAR; INTRAVENOUS at 08:03

## 2019-03-16 RX ADMIN — DOBUTAMINE HYDROCHLORIDE SCH MLS/HR: 200 INJECTION INTRAVENOUS at 08:03

## 2019-03-16 RX ADMIN — INSULIN ASPART SCH UNIT: 100 INJECTION, SOLUTION INTRAVENOUS; SUBCUTANEOUS at 11:12

## 2019-03-16 RX ADMIN — ISOSORBIDE MONONITRATE SCH MG: 30 TABLET, EXTENDED RELEASE ORAL at 08:03

## 2019-03-16 RX ADMIN — INSULIN ASPART SCH UNIT: 100 INJECTION, SOLUTION INTRAVENOUS; SUBCUTANEOUS at 06:48

## 2019-03-16 RX ADMIN — INSULIN ASPART SCH UNIT: 100 INJECTION, SOLUTION INTRAVENOUS; SUBCUTANEOUS at 17:24

## 2019-03-16 RX ADMIN — FUROSEMIDE SCH MG: 10 INJECTION, SOLUTION INTRAMUSCULAR; INTRAVENOUS at 21:04

## 2019-03-16 RX ADMIN — HEPARIN SODIUM SCH UNIT: 5000 INJECTION, SOLUTION INTRAVENOUS; SUBCUTANEOUS at 15:30

## 2019-03-16 RX ADMIN — INSULIN ASPART SCH: 100 INJECTION, SOLUTION INTRAVENOUS; SUBCUTANEOUS at 21:11

## 2019-03-16 RX ADMIN — HEPARIN SODIUM SCH UNIT: 5000 INJECTION, SOLUTION INTRAVENOUS; SUBCUTANEOUS at 22:57

## 2019-03-16 NOTE — P.PN
Subjective


Progress Note Date: 03/16/19


Principal diagnosis: 





THE PATIENT IS A 73-YEAR-OLD  MALE WITH A PAST MEDICAL HISTORY OF TYPE 

2 DIABETES, essential hypertension, hyperlipidemia, CKD stage III presented to 

the ER with complaints of dyspnea, 3+ pillow orthopnea, and bilateral lower 

extremity swelling he was admitted for acute CHF exacerbation After presenting 

with a significantly elevated [place] proBNP at [address] along with a clinically 

hypervolemic exam.  He was started on diuresis with Lasix, placed on a fluid 

restriction, with daily weights.  2-D echocardiogram performed was consistent 

with systolic CHF exacerbation with a lvEF of less than 20%, impaired right 

ventricular systolic function and a severely dilated left atrium with moderate 

pulmonary hypertension, moderate TR, mild to moderate MR, Chest x-ray was 

consistent with cardiomegaly with evidence of heart failure.  The patient was 

noted to have a mildly elevated troponin secondary to demand ischemia from his 

CHF exacerbation.  The patient was started on dobutamine and nephrology was 

consulted





Patient complaining of fatigue and shortness of breath, not been able to be up 

and ambulatory on his own.  Continues to complain of lower extremity swelling 

although it appears to be improving, per nursing the patient is diuresing well. 

Fluid balance -2.4 and his weight is down 2 kg doing well with oral fluid 

restriction.  Creatinine 2.38





Objective





- Vital Signs


Vital signs: 


                                   Vital Signs











Temp  97.3 F L  03/16/19 08:01


 


Pulse  100   03/16/19 08:01


 


Resp  18   03/16/19 08:01


 


BP  120/60   03/16/19 08:01


 


Pulse Ox  98   03/16/19 08:01








                                 Intake & Output











 03/15/19 03/16/19 03/16/19





 18:59 06:59 18:59


 


Intake Total 740 118 205.857


 


Output Total 790 2550 


 


Balance -50 -3642 205.857


 


Weight 67 kg 65 kg 


 


Intake:   


 


  Intake, IV Titration   205.857





  Amount   


 


    DOBUTamine DRIP 500 mg In   205.857





    Dextrose/Water 1 250ml.   





    bag @ 5 MCG/KG/MIN 10.05   





    mls/hr IV .Q24H PENG Rx#:   





    [id number]   


 


  Oral 740 118 


 


Output:   


 


  Urine 790 2550 


 


Other:   


 


  Voiding Method Urinal Bedside Commode 





 Diaper  





 Incontinent  


 


  # Voids  2 


 


  # Bowel Movements 1  














- Exam





Constitutional: No acute distress, conversant, pleasant





Eyes: Anicteric sclerae, moist conjunctiva, no lid-lag, PERRLA





ENMT: NC/AT,Oropharynx clear, no erythema, exudates





Neck:Supple, FROM, no masses, or JVD, No carotid bruits; No thyromegaly





Lungs: Clear to auscultation, Clear to percussion, Normal respiratory effort, no

accessory muscle use 





Cardiovascular: Heart regular in rate and rhythm,  No murmurs, gallops, or rubs 

, + 2 bilateral lower extremity pitting edema





Abdominal: Soft Nontender, nom distended, no guarding, no rebound or  rigidity, 

Normoactive bowel sounds No hepatomegaly, No splenomegaly,  No palpable mass No 

abdominal wall hernia noted 





Skin: Normal temperature, tone, texture, turgor, No induration No subcutaneous 

nodules, No rash, lesions, No ulcers





Extremities:No digital cyanosis No clubbing, Pedal pulses intact and  

symmetrical Radial pulses intact and symmetrical Normal gait and station, No 

calf tenderness


 


Psychiatric: Alert and oriented to person, place and time, Appropriate affect 

Intact judgement   


      


Neuro: Muscles Strength 5/5 in all 4 extremities, Sensation to light touch 

grossly present throughout, Cranial nerves II-XII grossly intact. No focal 

sensory deficits








- Labs


CBC & Chem 7: 


                                 03/16/19 06:45





                                 03/16/19 06:45


Labs: 


                  Abnormal Lab Results - Last 24 Hours (Table)











  03/15/19 03/15/19 03/15/19 Range/Units





  11:35 11:52 14:40 


 


RBC     (4.30-5.90)  m/uL


 


Hgb     (13.0-17.5)  gm/dL


 


Hct     (39.0-53.0)  %


 


Plt Count     (150-450)  k/uL


 


Carbon Dioxide     (22-30)  mmol/L


 


BUN     (9-20)  mg/dL


 


Creatinine     (0.66-1.25)  mg/dL


 


Glucose     (74-99)  mg/dL


 


POC Glucose (mg/dL)  55 L  57 L  206 H  (75-99)  mg/dL














  03/15/19 03/16/19 03/16/19 Range/Units





  17:06 06:19 06:45 


 


RBC    3.52 L  (4.30-5.90)  m/uL


 


Hgb    11.4 L  (13.0-17.5)  gm/dL


 


Hct    35.1 L  (39.0-53.0)  %


 


Plt Count    131 L  (150-450)  k/uL


 


Carbon Dioxide     (22-30)  mmol/L


 


BUN     (9-20)  mg/dL


 


Creatinine     (0.66-1.25)  mg/dL


 


Glucose     (74-99)  mg/dL


 


POC Glucose (mg/dL)  234 H  181 H   (75-99)  mg/dL














  03/16/19 Range/Units





  06:45 


 


RBC   (4.30-5.90)  m/uL


 


Hgb   (13.0-17.5)  gm/dL


 


Hct   (39.0-53.0)  %


 


Plt Count   (150-450)  k/uL


 


Carbon Dioxide  32 H  (22-30)  mmol/L


 


BUN  38 H  (9-20)  mg/dL


 


Creatinine  2.38 H  (0.66-1.25)  mg/dL


 


Glucose  178 H  (74-99)  mg/dL


 


POC Glucose (mg/dL)   (75-99)  mg/dL














Assessment and Plan


(1) Acute systolic (congestive) heart failure


Narrative/Plan: 





* NT ProBNP elevation to 32,700.  Chest x-ray shows cardiomegaly and mild heart 

  failure. 


*  Venous duplex is negative for DVT.  Continue diuresis with Lasix 40 mg IV 3 

  times a day.  As per cardiology, EF is less than 20%, started on dobutamine 

  drip. 


*  Continue hydralazine and Imdur.  Strict in's and outs.  Daily weights.  

  Telemetry monitoring.


*  Echo also showing moderate pulmonary hypertension, moderate TR, mild to mo

  derate MR


Current Visit: Yes   Status: Acute   Code(s): I50.21 - ACUTE SYSTOLIC 

(CONGESTIVE) HEART FAILURE   SNOMED Code(s): 699382583


   





(2) Type 2 diabetes mellitus with hyperglycemia


Narrative/Plan: 





* .  Point-of-care glucose.  Continue Levemir 10 units at bedtime.  Regular 

  Accu-Cheks.  Hypoglycemic precautions


Current Visit: Yes   Status: Acute   Code(s): E11.65 - TYPE 2 DIABETES MELLITUS 

WITH HYPERGLYCEMIA   SNOMED Code(s): 944500100128581


   





(3) Essential hypertension


Narrative/Plan: 





* Blood pressure stable controlled on current regimen


* Continue hydralazine, discontinue Norvasc will add ACE inhibitor lisinopril 20

  mg daily, and continue Imdur


Current Visit: Yes   Status: Acute   Code(s): I10 - ESSENTIAL (PRIMARY) 

HYPERTENSION   SNOMED Code(s): 29609761


   





(4) Ischemic cardiomyopathy


Current Visit: Yes   Status: Acute   Code(s): I25.5 - ISCHEMIC CARDIOMYOPATHY   

SNOMED Code(s): 305811507


   





(5) Chronic kidney disease, stage III (moderate)


Narrative/Plan: 





* Renal following creatinine 2.38 today  


Current Visit: Yes   Status: Acute   Code(s): N18.3 - CHRONIC KIDNEY DISEASE, 

STAGE 3 (MODERATE)   SNOMED Code(s): 484541416

## 2019-03-16 NOTE — CONS
CONSULTATION



REASON FOR CONSULT:

Renal failure.



HISTORY OF PRESENT ILLNESS:

Patient is a 73-year-old male who was admitted to the hospital with complaints 
of

shortness of breath, he did not have any chest pains.  Patient has also 
developed has

also had increased lower extremity swelling.  He denied any fevers or chills.  
Patient

is currently being diuresed.



Patient does have CKD stage IIIB, secondary to diabetic nephropathy and

nephrosclerosis.  Patient is maintained on ACE inhibitors as outpatient.  He is

currently maintained on dobutamine and Lasix IV.  He states he is feeling much 
better

with much improved urine output.  Ejection fraction on echocardiogram done in 
the

hospital was less than 20% with severely dilated left atrium.  Overall, patient 
states

he is feeling better.



PAST MEDICAL HISTORY:

Coronary artery disease, cardiomyopathy, CHF, type 2 diabetes, hypertension, CKD
stage

III, hyperlipidemia, history of prostatic cancer.



PAST SURGICAL HISTORY:

Right ankle surgery.  Bilateral cataract surgery, colonoscopy, prostatectomy.



MEDICATIONS:

Prior to admission included Zocor, Norvasc, Glucotrol, Januvia, vitamin D2, 
Micardis.



ALLERGIES:

Include FISH and SULFA.



REVIEW OF SYSTEMS:

As per HPI.  Other systems negative.



PHYSICAL EXAMINATION:

Patient is comfortable, awake, alert, oriented x3, not in any acute distress.  
Blood

pressure is 120/60, heart rate 100 per minute.  He is afebrile.

Examination of the heart, S1, S2.  Examination of the lungs, bilateral breath 
sounds

are heard.  Abdomen is soft, nontender.  Examination of lower extremities shows 
edema

2+ bilaterally.  CNS exam is grossly intact.



LABS:

Show sodium of 138, potassium 3.9, CO2 is 32, BUN 38, serum creatinine 2.38.



ASSESSMENT:

1. Chronic kidney disease, NKF stage III, secondary to diabetic nephropathy and

    nephrosclerosis.  Baseline creatinine about 1.7-2 mg/dL.

2. Acute kidney injury mainly cardiorenal.  Currently patient is being diuresed.

    Agree with decreasing dose of ACE inhibitors.  Blood pressure is not 
significantly

    elevated.  I will continue with the dobutamine drip and Lasix IV for now.

    Patient does have evidence of mild metabolic alkalosis secondary to the 
diuresis.

    I will decrease the Lasix to 40 mg q.12 hours.

3. Cardiomyopathy, ejection fraction less than 20%.

4. Congestive heart failure, acute on top of chronic, currently mainly systolic.

5. Hypertension.  Continue current medications.



PLAN:

Continue current dose of ACE inhibitors.  Decrease Lasix to 40 mg q.12 hours.  
Continue

with dobutamine.  Repeat labs in a.m.



Thank you for this consultation.  Will continue to follow the patient during

hospitalization.





ACACIA / SHILOHN: [id number] / Job#: [id number]

MTDD

## 2019-03-16 NOTE — PN
PROGRESS NOTE



Mr. Lujan is a 73-year-old gentleman recently diagnosed with CHF.  He is breathing

easier, resting comfortably.  He has history of diabetes and chronic kidney disease as

well.  I am recommending that we initiate him on Coreg 6.25 mg b.i.d., decrease aspirin

to 81 mg daily, continue hydralazine and lisinopril.



S1-S2 heard normally.  Short systolic murmur noted.  Lungs reveal diminished air entry.

Abdomen and lower extremity exam is unchanged.



Prognosis remains guarded.  We will optimize his heart failure management and seek

further input from Nephrology.





MMODL / IJN: [id number] / Job#: [id number]

## 2019-03-17 LAB
ANION GAP SERPL CALC-SCNC: 8 MMOL/L
BUN SERPL-SCNC: 38 MG/DL (ref 9–20)
CALCIUM SPEC-MCNC: 8.4 MG/DL (ref 8.4–10.2)
CHLORIDE SERPL-SCNC: 102 MMOL/L (ref 98–107)
CO2 SERPL-SCNC: 30 MMOL/L (ref 22–30)
GLUCOSE BLD-MCNC: 111 MG/DL (ref 75–99)
GLUCOSE BLD-MCNC: 119 MG/DL (ref 75–99)
GLUCOSE BLD-MCNC: 129 MG/DL (ref 75–99)
GLUCOSE BLD-MCNC: 175 MG/DL (ref 75–99)
GLUCOSE BLD-MCNC: 291 MG/DL (ref 75–99)
GLUCOSE BLD-MCNC: 59 MG/DL (ref 75–99)
GLUCOSE SERPL-MCNC: 105 MG/DL (ref 74–99)
POTASSIUM SERPL-SCNC: 4.3 MMOL/L (ref 3.5–5.1)
SODIUM SERPL-SCNC: 140 MMOL/L (ref 137–145)

## 2019-03-17 RX ADMIN — LISINOPRIL SCH MG: 5 TABLET ORAL at 22:03

## 2019-03-17 RX ADMIN — ASPIRIN 81 MG CHEWABLE TABLET SCH MG: 81 TABLET CHEWABLE at 08:01

## 2019-03-17 RX ADMIN — DOBUTAMINE HYDROCHLORIDE SCH: 200 INJECTION INTRAVENOUS at 15:27

## 2019-03-17 RX ADMIN — FUROSEMIDE SCH MG: 10 INJECTION, SOLUTION INTRAMUSCULAR; INTRAVENOUS at 08:01

## 2019-03-17 RX ADMIN — INSULIN ASPART SCH: 100 INJECTION, SOLUTION INTRAVENOUS; SUBCUTANEOUS at 11:30

## 2019-03-17 RX ADMIN — HEPARIN SODIUM SCH UNIT: 5000 INJECTION, SOLUTION INTRAVENOUS; SUBCUTANEOUS at 22:03

## 2019-03-17 RX ADMIN — HEPARIN SODIUM SCH UNIT: 5000 INJECTION, SOLUTION INTRAVENOUS; SUBCUTANEOUS at 15:31

## 2019-03-17 RX ADMIN — INSULIN DETEMIR SCH: 100 INJECTION, SOLUTION SUBCUTANEOUS at 21:02

## 2019-03-17 RX ADMIN — ISOSORBIDE MONONITRATE SCH MG: 30 TABLET, EXTENDED RELEASE ORAL at 08:01

## 2019-03-17 RX ADMIN — INSULIN ASPART SCH: 100 INJECTION, SOLUTION INTRAVENOUS; SUBCUTANEOUS at 20:59

## 2019-03-17 RX ADMIN — CARVEDILOL SCH MG: 6.25 TABLET, FILM COATED ORAL at 17:27

## 2019-03-17 RX ADMIN — INSULIN ASPART SCH: 100 INJECTION, SOLUTION INTRAVENOUS; SUBCUTANEOUS at 06:09

## 2019-03-17 RX ADMIN — INSULIN ASPART SCH UNIT: 100 INJECTION, SOLUTION INTRAVENOUS; SUBCUTANEOUS at 17:27

## 2019-03-17 RX ADMIN — CARVEDILOL SCH MG: 6.25 TABLET, FILM COATED ORAL at 06:20

## 2019-03-17 RX ADMIN — HEPARIN SODIUM SCH UNIT: 5000 INJECTION, SOLUTION INTRAVENOUS; SUBCUTANEOUS at 08:02

## 2019-03-17 RX ADMIN — ATORVASTATIN CALCIUM SCH MG: 20 TABLET, FILM COATED ORAL at 08:01

## 2019-03-17 NOTE — P.PN
Subjective


Progress Note Date: 03/17/19


Principal diagnosis: 





THE PATIENT IS A 73-YEAR-OLD  MALE WITH A PAST MEDICAL HISTORY OF TYPE 

2 DIABETES, essential hypertension, hyperlipidemia, CKD stage III presented to 

the ER with complaints of dyspnea, 3+ pillow orthopnea, and bilateral lower 

extremity swelling he was admitted for acute CHF exacerbation After presenting 

with a significantly elevated NT proBNP at 9[address] along with a clinically 

hypervolemic exam.  He was started on diuresis with Lasix, placed on a fluid 

restriction, with daily weights.  2-D echocardiogram performed was consistent 

with systolic CHF exacerbation with a lvEF of less than 20%, impaired right 

ventricular systolic function and a severely dilated left atrium with moderate 

pulmonary hypertension, moderate TR, mild to moderate MR, Chest x-ray was 

consistent with cardiomegaly with evidence of heart failure.  The patient was 

noted to have a mildly elevated troponin secondary to demand ischemia from his 

CHF exacerbation.  The patient was started on dobutamine and nephrology was 

consulted





Patient complaining of fatigue and shortness of breath when on Room air,  and is

symptomatic with this 2 L nasal cannula.  Continues to complain of lower 

extremity swelling although it appears to be improving, per nursing the patient 

is diuresing well.    Creatinine 2.5





Objective





- Vital Signs


Vital signs: 


                                   Vital Signs











Temp  97.5 F L  03/17/19 07:39


 


Pulse  76   03/17/19 11:27


 


Resp  18   03/17/19 11:27


 


BP  107/70   03/17/19 11:27


 


Pulse Ox  100   03/17/19 11:27








                                 Intake & Output











 03/16/19 03/17/19 03/17/19





 18:59 06:59 18:59


 


Intake Total 881.857 500 118


 


Output Total 1550 1400 600


 


Balance -668.143 -900 -482


 


Weight  63.9 kg 


 


Intake:   


 


  IV 40  


 


    0.9@10 40  


 


  Intake, IV Titration 205.857  





  Amount   


 


    DOBUTamine DRIP 500 mg In 205.857  





    Dextrose/Water 1 250ml.   





    bag @ 2.5 MCG/KG/MIN 5.   





    025 mls/hr IV .Q24H [facility]   





    Rx#:[id number]   


 


  Oral 636 500 118


 


Output:   


 


  Urine 1550 1400 600


 


Other:   


 


  Voiding Method  Bedside Commode 





  Urinal 


 


  # Voids   2














- Exam





Constitutional: No acute distress, conversant, pleasant





Eyes: Anicteric sclerae, moist conjunctiva, no lid-lag, PERRLA





ENMT: NC/AT,Oropharynx clear, no erythema, exudates





Neck:Supple, FROM, no masses, or JVD, No carotid bruits; No thyromegaly





Lungs: Clear to auscultation, Clear to percussion, Normal respiratory effort, no

accessory muscle use 





Cardiovascular: Heart regular in rate and rhythm,  No murmurs, gallops, or rubs 

, + 2 bilateral lower extremity pitting edema





Abdominal: Soft Nontender, nom distended, no guarding, no rebound or  rigidity, 

Normoactive bowel sounds No hepatomegaly, No splenomegaly,  No palpable mass No 

abdominal wall hernia noted 





Skin: Normal temperature, tone, texture, turgor, No induration No subcutaneous 

nodules, No rash, lesions, No ulcers





Extremities:No digital cyanosis No clubbing, Pedal pulses intact and  

symmetrical Radial pulses intact and symmetrical Normal gait and station, No 

calf tenderness


 


Psychiatric: Alert and oriented to person, place and time, Appropriate affect 

Intact judgement   


      


Neuro: Muscles Strength 5/5 in all 4 extremities, Sensation to light touch 

grossly present throughout, Cranial nerves II-XII grossly intact. No focal sen

alva deficits








- Labs


CBC & Chem 7: 


                                 03/16/19 06:45





                                 03/17/19 07:40


Labs: 


                  Abnormal Lab Results - Last 24 Hours (Table)











  03/16/19 03/16/19 03/17/19 Range/Units





  16:40 21:07 06:06 


 


BUN     (9-20)  mg/dL


 


Creatinine     (0.66-1.25)  mg/dL


 


Glucose     (74-99)  mg/dL


 


POC Glucose (mg/dL)  174 H  125 H  59 L  (75-99)  mg/dL














  03/17/19 03/17/19 03/17/19 Range/Units





  06:31 07:40 09:44 


 


BUN   38 H   (9-20)  mg/dL


 


Creatinine   2.55 H   (0.66-1.25)  mg/dL


 


Glucose   105 H   (74-99)  mg/dL


 


POC Glucose (mg/dL)  119 H   175 H  (75-99)  mg/dL














  03/17/19 Range/Units





  11:28 


 


BUN   (9-20)  mg/dL


 


Creatinine   (0.66-1.25)  mg/dL


 


Glucose   (74-99)  mg/dL


 


POC Glucose (mg/dL)  129 H  (75-99)  mg/dL














Assessment and Plan


(1) Acute systolic (congestive) heart failure


Narrative/Plan: 





* NT ProBNP elevation to 32,700.  Chest x-ray shows cardiomegaly and mild heart 

  failure. 


*  Venous duplex is negative for DVT.  Continue diuresis with Lasix 40 mg IV q12

   As per cardiology,  


*  Continue hydralazine and Imdur.  Strict in's and outs.  Daily weights.  Tel

  emetry monitoring.


*  Echo EF is less than 20%, also showing moderate pulmonary hypertension, 

  moderate TR, mild to moderate MR, 


* Plan To wean dopamine drip today


Current Visit: Yes   Status: Acute   Code(s): I50.21 - ACUTE SYSTOLIC 

(CONGESTIVE) HEART FAILURE   SNOMED Code(s): 954407024


   





(2) Type 2 diabetes mellitus with hyperglycemia


Narrative/Plan: 





* .  Point-of-care glucose.  Continue Levemir 10 units at bedtime.  Regular 

  Accu-Cheks.  Hypoglycemic precautions


Current Visit: Yes   Status: Acute   Code(s): E11.65 - TYPE 2 DIABETES MELLITUS 

WITH HYPERGLYCEMIA   SNOMED Code(s): 139774808345495


   





(3) Essential hypertension


Narrative/Plan: 





* Blood pressure stable controlled on current regimen


* Continue hydralazine, discontinue Norvasc will add ACE inhibitor lisinopril 20

  mg daily, and continue Imdur


Current Visit: Yes   Status: Acute   Code(s): I10 - ESSENTIAL (PRIMARY) 

HYPERTENSION   SNOMED Code(s): 06851474


   





(4) Ischemic cardiomyopathy


Narrative/Plan: 





* Continue Coreg, Lipitor, aspirin and lisinopril


Current Visit: Yes   Status: Acute   Code(s): I25.5 - ISCHEMIC CARDIOMYOPATHY   

SNOMED Code(s): 414683355


   





(5) Chronic kidney disease, stage III (moderate)


Narrative/Plan: 





* Acute kidney injury superimposed on chronic kidney disease likely cardiorenal 

  superimposed on diabetic nephropathy and nephrosclerosis


* Renal following appreciate Dr. Ramsay's recommendations 


* creatinine 2.5 today previous baseline 1.7-2 


Current Visit: Yes   Status: Acute   Code(s): N18.3 - CHRONIC KIDNEY DISEASE, 

STAGE 3 (MODERATE)   SNOMED Code(s): 906893433


   


Plan: 





disposition


* Anticipated discharge 1-2 days

## 2019-03-17 NOTE — PN
PROGRESS NOTE



Patient is seen for followup for chronic kidney disease and acute kidney injury.  He

was admitted to the hospital with CHF exacerbation.  Currently patient is being

diuresed.  He is also maintained on IV dobutamine for significant cardiomyopathy with

ejection fraction of less than 20%.  Lasix was decreased to 40 mg q.12 hours yesterday.

Patient has had good urine output.  He has lost a significant amount of weight and is

down to 63.9 from 70 kilos on initial admission.



Serum creatinine is at 2.5, which is up from 2.25 on initial admission.  Previous labs

show serum creatinine about 2-2.1 in October, November and December of 2018.



PHYSICAL EXAMINATION:

On examination today, patient is comfortable.  He is awake.  He is not in any acute

distress.  Blood pressure was 115/72, heart rate 88 per minute.  He is afebrile.

Examination of the heart: S1, S2.  Examination of the lungs: Decreased breath sounds at

bases.  No crackles or wheezing is heard.  Abdomen is soft, nontender.  Examination

lower extremities shows edema 1+ bilaterally, currently improved.



LABS:

Show sodium 140, potassium 4.3, BUN 38, serum creatinine 2.5, calcium is 8.4.



ASSESSMENT:

1. Acute kidney injury, cardiorenal, currently improving.  Decrease the Lasix to 40 mg

    IV daily.  Blood pressure is on the lower side.  I will hold off on the ACE

    inhibitors as well for now.

2. Severe cardiomyopathy, EF less than 20%, maintained on IV dobutamine.

3. Congestive heart failure, acute on top of chronic, mainly systolic.

4. 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

    stage IV.  Previous creatinine about 2 mg/dL.

5. Hypertension. Blood pressure currently on the lower side.  Hold off on lisinopril.



PLAN:

Hold lisinopril as systolic blood pressure is about 107 mmHg.  I will decrease the

Lasix to 40 mg IV daily and the dobutamine can likely be discontinued tomorrow.  Repeat

labs in a.m.  We can resume low-dose ACE inhibitors as outpatient depending on the

volume status.



MMODL / IJN: [id number] / Job#: [id number]

## 2019-03-17 NOTE — P.PN
Subjective








Mr. Kayden Lujan, male 73 years of age admitted for shortness of breath and 

bilateral lower extremity edema. History of diabetes, hypertension, high 

cholesterol and CKD3 .  Ejection fraction 20%. Patient continues to improve, 

currently alert and oriented lying in bed with no acute distress. Dobutamine 

drip continues.  Patient has no current complaints of shortness of breath but 

continues with lower extremity edema. patient continues with IV Lasix 40 mg IV 

twice daily.  Patient sinus rhythm, heart rate 72.  Kidney function remains 

elevated BUN 38/creatinine 2.5. Patient continues on O2 at 2 L.




















Objective





- Vital Signs


Vital signs: 


                                   Vital Signs











Temp  97.5 F L  03/17/19 07:39


 


Pulse  76   03/17/19 11:27


 


Resp  18   03/17/19 11:27


 


BP  107/70   03/17/19 11:27


 


Pulse Ox  100   03/17/19 11:27








                                 Intake & Output











 03/16/19 03/17/19 03/17/19





 18:59 06:59 18:59


 


Intake Total 881.857 500 118


 


Output Total 1550 1400 600


 


Balance -668.143 -900 -482


 


Weight  63.9 kg 


 


Intake:   


 


  IV 40  


 


    0.9@10 40  


 


  Intake, IV Titration 205.857  





  Amount   


 


    DOBUTamine DRIP 500 mg In 205.857  





    Dextrose/Water 1 250ml.   





    bag @ 2.5 MCG/KG/MIN 5.   





    025 mls/hr IV .Q24H [facility]   





    Rx#:[id number]   


 


  Oral 636 500 118


 


Output:   


 


  Urine 1550 1400 600


 


Other:   


 


  Voiding Method  Bedside Commode 





  Urinal 


 


  # Voids   2














- Exam


CARDIAC PHYSICAL EXAM:





GENERAL: Well-appearing, well-nourished and in no acute distress.


NECK: Supple without JVD or thyromegaly.


LUNGS: Diminished breath sounds bilaterally. Respiration equal and unlabored.  

No wheezes, rales or rhonchi.


HEART: Regular rate and rhythm, no rubs or gallops. S1 and S2 heard. Mild 

systolic murmur at the base, I/VI..


EXTREMITIES: Normal range of motion, BLE edema, 2-3+.  No clubbing or cyanosis. 

Peripheral pulses intact.








- Labs


CBC & Chem 7: 


                                 03/16/19 06:45





                                 03/17/19 07:40


Labs: 


                  Abnormal Lab Results - Last 24 Hours (Table)











  03/16/19 03/16/19 03/17/19 Range/Units





  16:40 21:07 06:06 


 


BUN     (9-20)  mg/dL


 


Creatinine     (0.66-1.25)  mg/dL


 


Glucose     (74-99)  mg/dL


 


POC Glucose (mg/dL)  174 H  125 H  59 L  (75-99)  mg/dL














  03/17/19 03/17/19 03/17/19 Range/Units





  06:31 07:40 09:44 


 


BUN   38 H   (9-20)  mg/dL


 


Creatinine   2.55 H   (0.66-1.25)  mg/dL


 


Glucose   105 H   (74-99)  mg/dL


 


POC Glucose (mg/dL)  119 H   175 H  (75-99)  mg/dL














  03/17/19 Range/Units





  11:28 


 


BUN   (9-20)  mg/dL


 


Creatinine   (0.66-1.25)  mg/dL


 


Glucose   (74-99)  mg/dL


 


POC Glucose (mg/dL)  129 H  (75-99)  mg/dL














Assessment and Plan


Plan: 


Continue to monitor closely. Decrease dobutamine drip to 2.5 mg then DC this 

p.m. Continue with IV Lasix 40 mg twice daily. Follow with recommendations of 

nephrology. Will follow along, please call with questions or concerns.

## 2019-03-18 LAB
ANION GAP SERPL CALC-SCNC: 5 MMOL/L
BUN SERPL-SCNC: 42 MG/DL (ref 9–20)
CALCIUM SPEC-MCNC: 8.3 MG/DL (ref 8.4–10.2)
CHLORIDE SERPL-SCNC: 100 MMOL/L (ref 98–107)
CO2 SERPL-SCNC: 33 MMOL/L (ref 22–30)
GLUCOSE BLD-MCNC: 157 MG/DL (ref 75–99)
GLUCOSE BLD-MCNC: 164 MG/DL (ref 75–99)
GLUCOSE BLD-MCNC: 194 MG/DL (ref 75–99)
GLUCOSE BLD-MCNC: 246 MG/DL (ref 75–99)
GLUCOSE SERPL-MCNC: 180 MG/DL (ref 74–99)
POTASSIUM SERPL-SCNC: 5.1 MMOL/L (ref 3.5–5.1)
SODIUM SERPL-SCNC: 138 MMOL/L (ref 137–145)

## 2019-03-18 RX ADMIN — INSULIN ASPART SCH UNIT: 100 INJECTION, SOLUTION INTRAVENOUS; SUBCUTANEOUS at 06:35

## 2019-03-18 RX ADMIN — LISINOPRIL SCH: 5 TABLET ORAL at 20:17

## 2019-03-18 RX ADMIN — ISOSORBIDE MONONITRATE SCH MG: 30 TABLET, EXTENDED RELEASE ORAL at 07:49

## 2019-03-18 RX ADMIN — INSULIN ASPART SCH UNIT: 100 INJECTION, SOLUTION INTRAVENOUS; SUBCUTANEOUS at 12:27

## 2019-03-18 RX ADMIN — FUROSEMIDE SCH MG: 10 INJECTION, SOLUTION INTRAMUSCULAR; INTRAVENOUS at 07:49

## 2019-03-18 RX ADMIN — ATORVASTATIN CALCIUM SCH MG: 20 TABLET, FILM COATED ORAL at 07:49

## 2019-03-18 RX ADMIN — CARVEDILOL SCH MG: 6.25 TABLET, FILM COATED ORAL at 17:32

## 2019-03-18 RX ADMIN — INSULIN ASPART SCH UNIT: 100 INJECTION, SOLUTION INTRAVENOUS; SUBCUTANEOUS at 20:33

## 2019-03-18 RX ADMIN — HEPARIN SODIUM SCH UNIT: 5000 INJECTION, SOLUTION INTRAVENOUS; SUBCUTANEOUS at 15:03

## 2019-03-18 RX ADMIN — ASPIRIN 81 MG CHEWABLE TABLET SCH MG: 81 TABLET CHEWABLE at 07:49

## 2019-03-18 RX ADMIN — HEPARIN SODIUM SCH UNIT: 5000 INJECTION, SOLUTION INTRAVENOUS; SUBCUTANEOUS at 23:43

## 2019-03-18 RX ADMIN — CARVEDILOL SCH MG: 6.25 TABLET, FILM COATED ORAL at 06:34

## 2019-03-18 RX ADMIN — LINAGLIPTIN SCH MG: 5 TABLET, FILM COATED ORAL at 09:40

## 2019-03-18 RX ADMIN — INSULIN ASPART SCH UNIT: 100 INJECTION, SOLUTION INTRAVENOUS; SUBCUTANEOUS at 17:32

## 2019-03-18 RX ADMIN — HEPARIN SODIUM SCH UNIT: 5000 INJECTION, SOLUTION INTRAVENOUS; SUBCUTANEOUS at 07:48

## 2019-03-18 NOTE — P.PN
Subjective





Patient is seen in follow-up for acute kidney injury on chronic kidney disease. 

Dobutamine drip has been discontinued.  He is now maintained on Lasix 40 mg IV 

once daily.  Admits to good urine output.  No vomiting or diarrhea.  Denies 

chest pain or shortness of breath.  Patient has systolic CHF with ejection 

fraction of near 20%.  Renal function is stable.





Vital signs are stable.


General: The patient appeared well nourished and normally developed. 


HEENT: Head exam is unremarkable. Neck is without jugular venous distension.


LUNGS: Lungs are clear to auscultation and percussion. Breath sounds decreased.


HEART: Rate and Rhythm are regular. First and second heart sounds normal. No 

murmurs, rubs or gallops. 


ABDOMEN: Abdominal exam reveals normal bowel sounds. Non-tender and non-

distended. No evidence of peritonitis.


EXTREMITITES: 1+ edema.





Objective





- Vital Signs


Vital signs: 


                                   Vital Signs











Temp  98.4 F   03/18/19 07:44


 


Pulse  83   03/18/19 07:44


 


Resp  18   03/18/19 07:44


 


BP  106/70   03/18/19 07:44


 


Pulse Ox  97   03/18/19 07:44








                                 Intake & Output











 03/17/19 03/18/19 03/18/19





 18:59 06:59 18:59


 


Intake Total 579 20 240


 


Output Total 800 100 100


 


Balance -221 -80 140


 


Weight  64 kg 


 


Intake:   


 


  IV  20 


 


    0.9@10  20 


 


  Oral 579  240


 


Output:   


 


  Urine 800 100 100


 


Other:   


 


  Voiding Method  Urinal 


 


  # Voids 2  














- Labs


CBC & Chem 7: 


                                 03/16/19 06:45





                                 03/18/19 06:26


Labs: 


                  Abnormal Lab Results - Last 24 Hours (Table)











  03/17/19 03/17/19 03/17/19 Range/Units





  09:44 11:28 16:31 


 


Carbon Dioxide     (22-30)  mmol/L


 


BUN     (9-20)  mg/dL


 


Creatinine     (0.66-1.25)  mg/dL


 


Glucose     (74-99)  mg/dL


 


POC Glucose (mg/dL)  175 H  129 H  291 H  (75-99)  mg/dL


 


Calcium     (8.4-10.2)  mg/dL














  03/17/19 03/18/19 03/18/19 Range/Units





  20:42 05:58 06:26 


 


Carbon Dioxide    33 H  (22-30)  mmol/L


 


BUN    42 H  (9-20)  mg/dL


 


Creatinine    2.51 H  (0.66-1.25)  mg/dL


 


Glucose    180 H  (74-99)  mg/dL


 


POC Glucose (mg/dL)  111 H  194 H   (75-99)  mg/dL


 


Calcium    8.3 L  (8.4-10.2)  mg/dL














Assessment and Plan


Plan: 





Assessment:


1.  Acute kidney injury secondary to cardiorenal syndrome.  Creatinine stable at

2.51 today.


2.  Chronic kidney disease stage IV secondary to diabetic kidney disease and 

cardiac renal syndrome with baseline creatinine near 2.


3.  Systolic CHF with ejection fraction of 20%.


4.  Volume overload.  Improved.


5.  Hypertension with chronic kidney disease.  Controlled.





Plan:


Maintain Lasix 40 mg IV daily.


Low-salt diet.


Repeat electrolytes in the morning.

## 2019-03-18 NOTE — P.PN
Subjective


Progress Note Date: 03/18/19


Principal diagnosis: 





THE PATIENT IS A 73-YEAR-OLD  MALE WITH A PAST MEDICAL HISTORY OF TYPE 

2 DIABETES, essential hypertension, hyperlipidemia, CKD stage III presented to 

the ER with complaints of dyspnea, 3+ pillow orthopnea, and bilateral lower 

extremity swelling he was admitted for acute CHF exacerbation After presenting 

with a significantly elevated NT proBNP at [address] along with a clinically 

hypervolemic exam.  He was started on diuresis with Lasix, placed on a fluid 

restriction, with daily weights.  2-D echocardiogram performed was consistent 

with systolic CHF exacerbation with a lvEF of less than 20%, impaired right 

ventricular systolic function and a severely dilated left atrium with moderate 

pulmonary hypertension, moderate TR, mild to moderate MR, Chest x-ray was 

consistent with cardiomegaly with evidence of heart failure.  The patient was 

noted to have a mildly elevated troponin secondary to demand ischemia from his 

CHF exacerbation.  The patient was started on dobutamine and nephrology was 

consulted





Patient seen and examined at bedside having breakfast, apparently has been 

ambulatory with a walker working with physical therapy, still pretty dyspneic 

with ambulation currently on room air, scheduled to have home O2 eval this 

morning.  Creatinine seems to have stabilized at 2.51.  No acute events 

overnight





Objective





- Vital Signs


Vital signs: 


                                   Vital Signs











Temp  98.4 F   03/18/19 07:44


 


Pulse  83   03/18/19 07:44


 


Resp  18   03/18/19 07:44


 


BP  106/70   03/18/19 07:44


 


Pulse Ox  97   03/18/19 07:44








                                 Intake & Output











 03/17/19 03/18/19 03/18/19





 18:59 06:59 18:59


 


Intake Total 579 20 240


 


Output Total 800 100 100


 


Balance -221 -80 140


 


Weight  64 kg 


 


Intake:   


 


  IV  20 


 


    0.9@10  20 


 


  Oral 579  240


 


Output:   


 


  Urine 800 100 100


 


Other:   


 


  Voiding Method  Urinal 


 


  # Voids 2  














- Exam





Constitutional: No acute distress, conversant, pleasant





Eyes: Anicteric sclerae, moist conjunctiva, no lid-lag, PERRLA





ENMT: NC/AT,Oropharynx clear, no erythema, exudates





Neck:Supple, FROM, no masses, or JVD, No carotid bruits; No thyromegaly





Lungs: Clear to auscultation, Clear to percussion, Normal respiratory effort, no

accessory muscle use 





Cardiovascular: Heart regular in rate and rhythm,  No murmurs, gallops, or rubs 

, + 2 bilateral lower extremity pitting edema improving





Abdominal: Soft Nontender, nom distended, no guarding, no rebound or  rigidity, 

Normoactive bowel sounds No hepatomegaly, No splenomegaly,  No palpable mass No 

abdominal wall hernia noted 





Skin: Normal temperature, tone, texture, turgor, No induration No subcutaneous 

nodules, No rash, lesions, No ulcers





Extremities:No digital cyanosis No clubbing, Pedal pulses intact and  

symmetrical Radial pulses intact and symmetrical Normal gait and station, No 

calf tenderness


 


Psychiatric: Alert and oriented to person, place and time, Appropriate affect 

Intact judgement   


      


Neuro: Muscles Strength 5/5 in all 4 extremities, Sensation to light touch 

grossly present throughout, Cranial nerves II-XII grossly intact. No focal sens

ory deficitsIs 








- Labs


CBC & Chem 7: 


                                 03/16/19 06:45





                                 03/18/19 06:26


Labs: 


                  Abnormal Lab Results - Last 24 Hours (Table)











  03/17/19 03/17/19 03/17/19 Range/Units





  09:44 11:28 16:31 


 


Carbon Dioxide     (22-30)  mmol/L


 


BUN     (9-20)  mg/dL


 


Creatinine     (0.66-1.25)  mg/dL


 


Glucose     (74-99)  mg/dL


 


POC Glucose (mg/dL)  175 H  129 H  291 H  (75-99)  mg/dL


 


Calcium     (8.4-10.2)  mg/dL














  03/17/19 03/18/19 03/18/19 Range/Units





  20:42 05:58 06:26 


 


Carbon Dioxide    33 H  (22-30)  mmol/L


 


BUN    42 H  (9-20)  mg/dL


 


Creatinine    2.51 H  (0.66-1.25)  mg/dL


 


Glucose    180 H  (74-99)  mg/dL


 


POC Glucose (mg/dL)  111 H  194 H   (75-99)  mg/dL


 


Calcium    8.3 L  (8.4-10.2)  mg/dL














Assessment and Plan


(1) Acute systolic (congestive) heart failure


Narrative/Plan: 





* NT ProBNP elevation to 32,700.  Chest x-ray shows cardiomegaly and mild heart 

  failure. 


*  Venous duplex is negative for DVT.  Continue diuresis with Lasix 40 mg IV 

  daily per renal recommendations


*  Continue hydralazine and Imdur.  Strict in's and outs.  Daily weights.  

  Telemetry monitoring.


*  Echo EF is less than 20%, also showing moderate pulmonary hypertension, 

  moderate TR, mild to moderate MR, 


* Dopamine drip discontinued yesterday


Current Visit: Yes   Status: Acute   Code(s): I50.21 - ACUTE SYSTOLIC 

(CONGESTIVE) HEART FAILURE   SNOMED Code(s): 175837430


   





(2) Type 2 diabetes mellitus with hyperglycemia


Narrative/Plan: 


 


* Discontinued his long-acting insulin to restart his home glipizide and Januvia

  regimen as patient was having some episodes of hypoglycemia


* Continue to monitor Point-of-care glucose. Regular Accu-Cheks.  Hypoglycemic 

  precautions


Current Visit: Yes   Status: Acute   Code(s): E11.65 - TYPE 2 DIABETES MELLITUS 

WITH HYPERGLYCEMIA   SNOMED Code(s): 911459666377509


   





(3) Essential hypertension


Narrative/Plan: 





* Blood pressure stable controlled on current regimen


* Continue hydralazine, discontinue Norvasc will add ACE inhibitor lisinopril 20

  mg daily, and continue Imdur


Current Visit: Yes   Status: Acute   Code(s): I10 - ESSENTIAL (PRIMARY) 

HYPERTENSION   SNOMED Code(s): 25893181


   





(4) Ischemic cardiomyopathy


Narrative/Plan: 





* Continue Coreg, Lipitor, aspirin and lisinopril


Current Visit: Yes   Status: Acute   Code(s): I25.5 - ISCHEMIC CARDIOMYOPATHY   

SNOMED Code(s): 461366339


   





(5) Chronic kidney disease, stage III (moderate)


Narrative/Plan: 





* Acute kidney injury superimposed on chronic kidney disease likely cardiorenal 

  superimposed on diabetic nephropathy and nephrosclerosis


* Renal following appreciate Dr. Ramsay's recommendations 


* creatinine 2.51 today previous baseline 1.7-2 


Current Visit: Yes   Status: Acute   Code(s): N18.3 - CHRONIC KIDNEY DISEASE, 

STAGE 3 (MODERATE)   SNOMED Code(s): 257101713


   


Plan: 





disposition


* Anticipated discharge 1-2 days

## 2019-03-18 NOTE — PN
PROGRESS NOTE



This is a gentleman with cardiomyopathy, ejection fraction of less than 25% was on a

dobutamine drip yesterday.  This was discontinued after nearly more than 48 hours.  I

am going to continue the IV Lasix for the time being.  Continue his other medications,

increase activity and see how he does clinically.  He also has chronic kidney disease

with a creatinine in the 2.5 range.



JVD is evident. S1, S2 heard normally. Short systolic murmur noted. Lungs reveal

improved air entry. Abdomen is soft.  Lower extremities reveal mild edema.  Central

nervous system is normal.





MMODL / IJN: [id number] / Job#: [id number]

## 2019-03-19 LAB
ANION GAP SERPL CALC-SCNC: 10 MMOL/L
BUN SERPL-SCNC: 47 MG/DL (ref 9–20)
CALCIUM SPEC-MCNC: 8.5 MG/DL (ref 8.4–10.2)
CHLORIDE SERPL-SCNC: 102 MMOL/L (ref 98–107)
CO2 SERPL-SCNC: 28 MMOL/L (ref 22–30)
GLUCOSE BLD-MCNC: 132 MG/DL (ref 75–99)
GLUCOSE BLD-MCNC: 143 MG/DL (ref 75–99)
GLUCOSE BLD-MCNC: 143 MG/DL (ref 75–99)
GLUCOSE BLD-MCNC: 182 MG/DL (ref 75–99)
GLUCOSE SERPL-MCNC: 131 MG/DL (ref 74–99)
MAGNESIUM SPEC-SCNC: 2 MG/DL (ref 1.6–2.3)
POTASSIUM SERPL-SCNC: 4.1 MMOL/L (ref 3.5–5.1)
SODIUM SERPL-SCNC: 140 MMOL/L (ref 137–145)

## 2019-03-19 RX ADMIN — HEPARIN SODIUM SCH UNIT: 5000 INJECTION, SOLUTION INTRAVENOUS; SUBCUTANEOUS at 23:45

## 2019-03-19 RX ADMIN — FUROSEMIDE SCH MG: 10 INJECTION, SOLUTION INTRAMUSCULAR; INTRAVENOUS at 08:50

## 2019-03-19 RX ADMIN — INSULIN ASPART SCH UNIT: 100 INJECTION, SOLUTION INTRAVENOUS; SUBCUTANEOUS at 17:58

## 2019-03-19 RX ADMIN — ASPIRIN 81 MG CHEWABLE TABLET SCH MG: 81 TABLET CHEWABLE at 08:49

## 2019-03-19 RX ADMIN — HEPARIN SODIUM SCH UNIT: 5000 INJECTION, SOLUTION INTRAVENOUS; SUBCUTANEOUS at 08:50

## 2019-03-19 RX ADMIN — INSULIN ASPART SCH UNIT: 100 INJECTION, SOLUTION INTRAVENOUS; SUBCUTANEOUS at 12:26

## 2019-03-19 RX ADMIN — LINAGLIPTIN SCH MG: 5 TABLET, FILM COATED ORAL at 08:49

## 2019-03-19 RX ADMIN — INSULIN ASPART SCH UNIT: 100 INJECTION, SOLUTION INTRAVENOUS; SUBCUTANEOUS at 06:32

## 2019-03-19 RX ADMIN — CARVEDILOL SCH MG: 6.25 TABLET, FILM COATED ORAL at 17:50

## 2019-03-19 RX ADMIN — FUROSEMIDE SCH MG: 10 INJECTION, SOLUTION INTRAMUSCULAR; INTRAVENOUS at 21:33

## 2019-03-19 RX ADMIN — HEPARIN SODIUM SCH UNIT: 5000 INJECTION, SOLUTION INTRAVENOUS; SUBCUTANEOUS at 17:49

## 2019-03-19 RX ADMIN — ISOSORBIDE MONONITRATE SCH MG: 30 TABLET, EXTENDED RELEASE ORAL at 08:51

## 2019-03-19 RX ADMIN — CARVEDILOL SCH MG: 6.25 TABLET, FILM COATED ORAL at 06:32

## 2019-03-19 RX ADMIN — LISINOPRIL SCH: 5 TABLET ORAL at 21:22

## 2019-03-19 RX ADMIN — INSULIN ASPART SCH UNIT: 100 INJECTION, SOLUTION INTRAVENOUS; SUBCUTANEOUS at 22:26

## 2019-03-19 RX ADMIN — ATORVASTATIN CALCIUM SCH MG: 20 TABLET, FILM COATED ORAL at 08:49

## 2019-03-19 NOTE — P.PN
Subjective





Patient is seen in follow-up for acute kidney injury on chronic kidney disease. 

Dobutamine drip has been discontinued.  He is now maintained on Lasix 40 mg IV 

once daily.  Admits to good urine output.  No vomiting or diarrhea.  Patient has

systolic CHF with ejection fraction of near 20%.  Renal function is stable.  

Still admits to edema in his lower extremities.  Also slightly more dyspneic 

today.





Vital signs are stable.


General: The patient appeared well nourished and normally developed. 


HEENT: Head exam is unremarkable. Neck is without jugular venous distension.


LUNGS: Breath sounds decreased.


HEART: Rate and Rhythm are regular. First and second heart sounds normal. No 

murmurs, rubs or gallops. 


ABDOMEN: Abdominal exam reveals normal bowel sounds. Non-tender and non-

distended. No evidence of peritonitis.


EXTREMITITES: 1+ edema.





Objective





- Vital Signs


Vital signs: 


                                   Vital Signs











Temp  98 F   03/19/19 08:00


 


Pulse  70   03/19/19 08:00


 


Resp  20   03/19/19 08:00


 


BP  106/64   03/19/19 08:00


 


Pulse Ox  98   03/19/19 08:00








                                 Intake & Output











 03/18/19 03/19/19 03/19/19





 18:59 06:59 18:59


 


Intake Total 480 118 


 


Output Total 200 200 


 


Balance 280 -82 


 


Weight  64 kg 


 


Intake:   


 


  Oral 480 118 


 


Output:   


 


  Urine 200 200 


 


Other:   


 


  Voiding Method  Urinal 


 


  # Voids  1 














- Labs


CBC & Chem 7: 


                                 03/16/19 06:45





                                 03/19/19 06:15


Labs: 


                  Abnormal Lab Results - Last 24 Hours (Table)











  03/18/19 03/18/19 03/18/19 Range/Units





  11:17 16:55 20:24 


 


BUN     (9-20)  mg/dL


 


Creatinine     (0.66-1.25)  mg/dL


 


Glucose     (74-99)  mg/dL


 


POC Glucose (mg/dL)  157 H  164 H  246 H  (75-99)  mg/dL














  03/19/19 03/19/19 Range/Units





  06:15 06:27 


 


BUN  47 H   (9-20)  mg/dL


 


Creatinine  2.42 H   (0.66-1.25)  mg/dL


 


Glucose  131 H   (74-99)  mg/dL


 


POC Glucose (mg/dL)   143 H  (75-99)  mg/dL














Assessment and Plan


Plan: 





Assessment:


1.  Acute kidney injury secondary to cardiorenal syndrome.  Creatinine stable at

2.51 today.


2.  Chronic kidney disease stage IV secondary to diabetic kidney disease and 

cardiac renal syndrome with baseline creatinine near 2.


3.  Systolic CHF with ejection fraction of 20%.


4.  Volume overload.


5.  Hypertension with chronic kidney disease.  Controlled.





Plan:


Increase Lasix to 40 mg IV twice daily.  Discussed with cardiology.


Low-salt diet.


Daily weights.


Repeat electrolytes in the morning.

## 2019-03-19 NOTE — P.PN
Subjective


Progress Note Date: 03/19/19


Principal diagnosis: 





CHF exacerbation





Patient was seen and examined.  No acute events overnight.  Patient reports 

slight worsening in his breathing from yesterday.  Patient appears tearful, 

states that he is unable to understand his condition despite multiple attempts 

at explaining.  He denies any pain.  States that he did not want to work with PT

today due to fatigue.  No nausea or vomiting.  No fever or chills.





Objective





- Vital Signs


Vital signs: 


                                   Vital Signs











Temp  98 F   03/19/19 08:00


 


Pulse  70   03/19/19 08:00


 


Resp  20   03/19/19 08:00


 


BP  106/64   03/19/19 08:00


 


Pulse Ox  98   03/19/19 08:00








                                 Intake & Output











 03/18/19 03/19/19 03/19/19





 18:59 06:59 18:59


 


Intake Total 480 118 


 


Output Total 200 200 


 


Balance 280 -82 


 


Weight  64 kg 


 


Intake:   


 


  Oral 480 118 


 


Output:   


 


  Urine 200 200 


 


Other:   


 


  Voiding Method  Urinal 


 


  # Voids  1 














- Exam





General: [non toxic], [no distress], [appears at stated age]


Derm: [warm], [dry]


Head: [atraumatic], [normocephalic], [symmetric]


Eyes: [EOMI], [no lid lag], [anicteric sclera]


Mouth: [no lip lesion], [mucus membranes moist]


Cardiovascular: [S1S2 reg], [no murmur], [positive DP bilateral]


Lungs: [Decreased breath sounds bilateral], [no rhonchi, no rales] , [no 

accessory muscle use]


Abdominal: [soft], [ nontender to palpation], [no guarding], [no appreciable 

organomegaly]


Ext: [no gross muscle atrophy], [1+ bilateral lower extremity edema], [no 

contractures]


Neuro:  [no focal neuro deficits]


Psych: [Alert], [oriented], [appropriate affect] 








- Labs


CBC & Chem 7: 


                                 03/16/19 06:45





                                 03/19/19 06:15


Labs: 


                  Abnormal Lab Results - Last 24 Hours (Table)











  03/18/19 03/18/19 03/19/19 Range/Units





  16:55 20:24 06:15 


 


BUN    47 H  (9-20)  mg/dL


 


Creatinine    2.42 H  (0.66-1.25)  mg/dL


 


Glucose    131 H  (74-99)  mg/dL


 


POC Glucose (mg/dL)  164 H  246 H   (75-99)  mg/dL














  03/19/19 Range/Units





  06:27 


 


BUN   (9-20)  mg/dL


 


Creatinine   (0.66-1.25)  mg/dL


 


Glucose   (74-99)  mg/dL


 


POC Glucose (mg/dL)  143 H  (75-99)  mg/dL














Assessment and Plan


Assessment: 





Assessment and Plan





1.  CHF exacerbation


2.  Troponin elevation


3.  CKD stage III


4.  Diabetes mellitus


5.  Hypertension


6.  DVT and GI prophylaxis





1.  BNP elevation to 32,700.  Chest x-ray shows cardiomegaly and mild heart 

failure.  Venous duplex is negative for DVT.  Increase diuresis with Lasix 40 mg

IV 2 times a day.  Echocardiogram shows EF less than 20%.  Continue hydralazine 

and Imdur.  Continue Coreg and lisinopril.  Strict in's and outs.  Daily 

weights.  Telemetry monitoring.  Will follow cardiology recommendations.  Will 

follow echocardiogram results.


2.  Troponin 0.048, 0.1312 with EKG showing sinus tachycardia and low voltage 

QRS.  Likely leak from CHF.  Telemetry monitoring.  Continue aspirin and 

Lipitor.  Will follow cardiology recommendations.


3.  BUN 39-47, creatinine 2.34-2.42.  Avoid nephrotoxins.  Monitor and replace 

electrolytes.  We'll avoid IVF due to fluid overloaded state.  We'll follow 

nephrology recommendations.


4.  Point-of-care glucose 143.  Continue Levemir 10 units at bedtime.  Regular 

Accu-Cheks.  Hypoglycemic precautions.


5.  /64.  Continue hydralazine 25 mg by mouth twice a day, Imdur 15 mg by 

mouth daily.  Continue lisinopril 5 mg by mouth daily.  Continue Coreg 6.25 mg 

by mouth  Monitor vitals, adjust medications as necessary.


6.  Heparin 5000 units subcutaneously 3 times a day.





Patient admitted for CHF exacerbation.  Continue IV diuresis as per cardiology 

recommendations.  Monitoring renal function as well.  He is pending clinical 

improvement.

## 2019-03-19 NOTE — PN
PROGRESS NOTE



Mr. Lujan is a gentleman with an ejection fraction of less than 25% with heart failure

who was recently diagnosed, also has renal failure.  This morning he complains of more

shortness of breath, feels bloated.  Denies chest pain.  Complains of fatigue.  His

weight is down, but clinically he has fine rales on both bases, which is a little more

obvious than yesterday and edema of lower extremities.  We will give him Lasix 40 mg IV

push q.12 hours both today and tomorrow and see how he does.  We will watch his

creatinine closely.  Discussed with the patient and also with Dr. Goetz.  He is on all

other anti heart failure medications, which we will continue.



Vital signs are stable, JVD is evident, S1-S2 heard normally, short systolic murmur

noted.  Lungs reveal fine rales over both bases.  Abdomen is soft.  Lower extremities

reveal mild to moderate bilateral edema.





MMODL / IJN: [id number] / Job#: [id number]

## 2019-03-20 LAB
ANION GAP SERPL CALC-SCNC: 12 MMOL/L
BUN SERPL-SCNC: 53 MG/DL (ref 9–20)
CALCIUM SPEC-MCNC: 8.9 MG/DL (ref 8.4–10.2)
CHLORIDE SERPL-SCNC: 103 MMOL/L (ref 98–107)
CO2 SERPL-SCNC: 26 MMOL/L (ref 22–30)
GLUCOSE BLD-MCNC: 117 MG/DL (ref 75–99)
GLUCOSE BLD-MCNC: 172 MG/DL (ref 75–99)
GLUCOSE BLD-MCNC: 246 MG/DL (ref 75–99)
GLUCOSE BLD-MCNC: 286 MG/DL (ref 75–99)
GLUCOSE SERPL-MCNC: 168 MG/DL (ref 74–99)
MAGNESIUM SPEC-SCNC: 2 MG/DL (ref 1.6–2.3)
POTASSIUM SERPL-SCNC: 4.2 MMOL/L (ref 3.5–5.1)
SODIUM SERPL-SCNC: 141 MMOL/L (ref 137–145)

## 2019-03-20 RX ADMIN — LINAGLIPTIN SCH MG: 5 TABLET, FILM COATED ORAL at 09:23

## 2019-03-20 RX ADMIN — ATORVASTATIN CALCIUM SCH MG: 20 TABLET, FILM COATED ORAL at 09:23

## 2019-03-20 RX ADMIN — HEPARIN SODIUM SCH UNIT: 5000 INJECTION, SOLUTION INTRAVENOUS; SUBCUTANEOUS at 09:23

## 2019-03-20 RX ADMIN — ISOSORBIDE MONONITRATE SCH MG: 30 TABLET, EXTENDED RELEASE ORAL at 09:23

## 2019-03-20 RX ADMIN — CARVEDILOL SCH MG: 6.25 TABLET, FILM COATED ORAL at 17:12

## 2019-03-20 RX ADMIN — HEPARIN SODIUM SCH UNIT: 5000 INJECTION, SOLUTION INTRAVENOUS; SUBCUTANEOUS at 23:04

## 2019-03-20 RX ADMIN — LISINOPRIL SCH MG: 5 TABLET ORAL at 20:45

## 2019-03-20 RX ADMIN — HEPARIN SODIUM SCH UNIT: 5000 INJECTION, SOLUTION INTRAVENOUS; SUBCUTANEOUS at 16:52

## 2019-03-20 RX ADMIN — CARVEDILOL SCH MG: 6.25 TABLET, FILM COATED ORAL at 06:47

## 2019-03-20 RX ADMIN — INSULIN ASPART SCH: 100 INJECTION, SOLUTION INTRAVENOUS; SUBCUTANEOUS at 19:17

## 2019-03-20 RX ADMIN — FUROSEMIDE SCH MG: 10 INJECTION, SOLUTION INTRAMUSCULAR; INTRAVENOUS at 09:23

## 2019-03-20 RX ADMIN — INSULIN ASPART SCH UNIT: 100 INJECTION, SOLUTION INTRAVENOUS; SUBCUTANEOUS at 12:47

## 2019-03-20 RX ADMIN — ASPIRIN 81 MG CHEWABLE TABLET SCH MG: 81 TABLET CHEWABLE at 09:22

## 2019-03-20 RX ADMIN — INSULIN ASPART SCH UNIT: 100 INJECTION, SOLUTION INTRAVENOUS; SUBCUTANEOUS at 23:03

## 2019-03-20 RX ADMIN — INSULIN ASPART SCH UNIT: 100 INJECTION, SOLUTION INTRAVENOUS; SUBCUTANEOUS at 06:47

## 2019-03-20 NOTE — P.PN
Subjective


Progress Note Date: 03/20/19


Principal diagnosis: 





CHF exacerbation





Patient was seen and examined.  No acute events overnight.  Patient reports an 

improvement in his breathing but continues to complain of lower extremity 

swelling.  He complains of fatigue, unable to work with PT yesterday, states 

that he is willing today.  He denies any chest pain, palpitations, nausea or 

vomiting.





Objective





- Vital Signs


Vital signs: 


                                   Vital Signs











Temp  97.4 F L  03/20/19 08:00


 


Pulse  78   03/20/19 08:00


 


Resp  22   03/20/19 11:55


 


BP  99/67   03/20/19 08:00


 


Pulse Ox  97   03/20/19 08:00








                                 Intake & Output











 03/19/19 03/20/19 03/20/19





 18:59 06:59 18:59


 


Intake Total 718  120


 


Output Total 725 675 50


 


Balance -7 -675 70


 


Weight  63.6 kg 


 


Intake:   


 


  Oral 718  120


 


Output:   


 


  Urine 725 675 50


 


Other:   


 


  Voiding Method  Urinal Urinal


 


  # Voids  1 1














- Exam





General: [non toxic], [no distress], [appears at stated age]


Derm: [warm], [dry]


Head: [atraumatic], [normocephalic], [symmetric]


Eyes: [EOMI], [no lid lag], [anicteric sclera]


Mouth: [no lip lesion], [mucus membranes moist]


Cardiovascular: [S1S2 reg], [no murmur], [positive DP bilateral]


Lungs: [Decreased breath sounds bilateral], [no rhonchi, no rales] , [no 

accessory muscle use]


Abdominal: [soft], [ nontender to palpation], [no guarding], [no appreciable 

organomegaly]


Ext: [no gross muscle atrophy], [1+ bilateral lower extremity edema], [no 

contractures]


Neuro:  [no focal neuro deficits]


Psych: [Alert], [oriented], [appropriate affect] 





- Labs


CBC & Chem 7: 


                                 03/16/19 06:45





                                 03/20/19 05:54


Labs: 


                  Abnormal Lab Results - Last 24 Hours (Table)











  03/19/19 03/19/19 03/20/19 Range/Units





  16:27 20:42 05:50 


 


BUN     (9-20)  mg/dL


 


Creatinine     (0.66-1.25)  mg/dL


 


Glucose     (74-99)  mg/dL


 


POC Glucose (mg/dL)  143 H  182 H  172 H  (75-99)  mg/dL














  03/20/19 03/20/19 Range/Units





  05:54 11:52 


 


BUN  53 H   (9-20)  mg/dL


 


Creatinine  2.67 H   (0.66-1.25)  mg/dL


 


Glucose  168 H   (74-99)  mg/dL


 


POC Glucose (mg/dL)   246 H  (75-99)  mg/dL














Assessment and Plan


Assessment: 





Assessment and Plan





1.  CHF exacerbation


2.  Troponin elevation


3.  CKD stage III


4.  Diabetes mellitus


5.  Hypertension


6.  DVT and GI prophylaxis





1.  BNP elevation to 32,700.  Chest x-ray shows cardiomegaly and mild heart 

failure.  Venous duplex is negative for DVT.  Increase diuresis with Lasix 60 mg

IV 2 times a day, transitioned to by mouth tomorrow.  Echocardiogram shows EF 

less than 20%.  Continue hydralazine and Imdur.  Continue Coreg and lisinopril. 

Strict in's and outs.  Daily weights.  Telemetry monitoring.  Will follow 

cardiology recommendations.  


2.  Troponin 0.048, 0.1312 with EKG showing sinus tachycardia and low voltage 

QRS.  Likely leak from CHF.  Telemetry monitoring.  Continue aspirin and 

Lipitor.  Will follow cardiology recommendations.


3.  BUN 53, creatinine 2.67, worsened today due to increase in IV Lasix.  Avoid 

nephrotoxins.  Monitor and replace electrolytes.  We'll avoid IVF due to fluid 

overloaded state.  We'll follow nephrology recommendations.


4.  Point-of-care glucose 246.  Continue Levemir 10 units at bedtime.  Regular 

Accu-Cheks.  Hypoglycemic precautions.


5.  BP 99/67.  Continue hydralazine 25 mg by mouth twice a day, Imdur 15 mg by 

mouth daily.  Continue lisinopril 5 mg by mouth daily.  Continue Coreg 6.25 mg 

by mouth  Monitor vitals, adjust medications as necessary.


6.  Heparin 5000 units subcutaneously 3 times a day.





Patient admitted for CHF exacerbation.  Continue IV diuresis as per cardiology 

recommendations.  Monitoring renal function as well.  He is pending clinical 

improvement.

## 2019-03-20 NOTE — PN
PROGRESS NOTE



A gentleman with congestive heart failure and chronic CKD, is doing little better

compared to yesterday.  His breathing is easier, he is less short of breath.  We will

continue IV Lasix today and switch him to oral Lasix 80 mg in the morning, 40 in the

afternoon.  We will increase activity.  He feels better.  His energy is somewhat

improved and he is not short of breath at rest.



Vitals are stable, JVD 1 cm, no carotid bruit, S1-S2 heard normally, short systolic

murmur noted.  Lungs reveal improved air entry.  Abdomen is soft, lower extremity edema

has improved.





MMODL / IJN: [id number] / Job#: [id number]

## 2019-03-20 NOTE — P.PN
Subjective





Patient is seen in follow-up for acute kidney injury on chronic kidney disease. 

Dobutamine drip has been discontinued.  He is now maintained on Lasix 40 mg IV 

twice daily.  Admits to good urine output.  No vomiting or diarrhea.  Patient 

has systolic CHF with ejection fraction of near 20%.  Renal function is worse 

which is due to diuresis.  Still admits to edema in his lower extremities.  





Vital signs are stable.


General: The patient appeared well nourished and normally developed. 


HEENT: Head exam is unremarkable. Neck is without jugular venous distension.


LUNGS: Breath sounds decreased.


HEART: Rate and Rhythm are regular. First and second heart sounds normal. No 

murmurs, rubs or gallops. 


ABDOMEN: Abdominal exam reveals normal bowel sounds. Non-tender and non-

distended. No evidence of peritonitis.


EXTREMITITES: 1+ edema.





Objective





- Vital Signs


Vital signs: 


                                   Vital Signs











Temp  97.6 F   03/20/19 04:00


 


Pulse  86   03/20/19 04:00


 


Resp  18   03/20/19 04:00


 


BP  120/79   03/20/19 04:00


 


Pulse Ox  97   03/20/19 04:00








                                 Intake & Output











 03/19/19 03/20/19 03/20/19





 18:59 06:59 18:59


 


Intake Total 718  120


 


Output Total 725 675 


 


Balance -7 -675 120


 


Weight  63.6 kg 


 


Intake:   


 


  Oral 718  120


 


Output:   


 


  Urine 725 675 


 


Other:   


 


  Voiding Method  Urinal 


 


  # Voids  1 














- Labs


CBC & Chem 7: 


                                 03/16/19 06:45





                                 03/20/19 05:54


Labs: 


                  Abnormal Lab Results - Last 24 Hours (Table)











  03/19/19 03/19/19 03/19/19 Range/Units





  12:04 16:27 20:42 


 


BUN     (9-20)  mg/dL


 


Creatinine     (0.66-1.25)  mg/dL


 


Glucose     (74-99)  mg/dL


 


POC Glucose (mg/dL)  132 H  143 H  182 H  (75-99)  mg/dL














  03/20/19 03/20/19 Range/Units





  05:50 05:54 


 


BUN   53 H  (9-20)  mg/dL


 


Creatinine   2.67 H  (0.66-1.25)  mg/dL


 


Glucose   168 H  (74-99)  mg/dL


 


POC Glucose (mg/dL)  172 H   (75-99)  mg/dL














Assessment and Plan


Plan: 





Assessment:


1.  Acute kidney injury secondary to cardiorenal syndrome.  Renal function 

slightly worse which is due to diuresis.  Creatinine 2.67 today.


2.  Chronic kidney disease stage IV secondary to diabetic kidney disease and 

cardiac renal syndrome with baseline creatinine near 2.


3.  Systolic CHF with ejection fraction of 20%.


4.  Volume overload.


5.  Hypertension with chronic kidney disease.  Controlled.





Plan:


Continue with diuresis.  60 mg IV Lasix this evening.  Plan to transition over 

to oral diuretics tomorrow.  Discussed with cardiology.


Low-salt diet.


Daily weights.


Repeat electrolytes in the morning.

## 2019-03-21 VITALS — RESPIRATION RATE: 18 BRPM

## 2019-03-21 LAB
ANION GAP SERPL CALC-SCNC: 9 MMOL/L
BUN SERPL-SCNC: 50 MG/DL (ref 9–20)
CALCIUM SPEC-MCNC: 8.8 MG/DL (ref 8.4–10.2)
CHLORIDE SERPL-SCNC: 102 MMOL/L (ref 98–107)
CO2 SERPL-SCNC: 30 MMOL/L (ref 22–30)
GLUCOSE BLD-MCNC: 153 MG/DL (ref 75–99)
GLUCOSE BLD-MCNC: 162 MG/DL (ref 75–99)
GLUCOSE BLD-MCNC: 172 MG/DL (ref 75–99)
GLUCOSE BLD-MCNC: 203 MG/DL (ref 75–99)
GLUCOSE SERPL-MCNC: 134 MG/DL (ref 74–99)
MAGNESIUM SPEC-SCNC: 2 MG/DL (ref 1.6–2.3)
POTASSIUM SERPL-SCNC: 3.6 MMOL/L (ref 3.5–5.1)
SODIUM SERPL-SCNC: 141 MMOL/L (ref 137–145)

## 2019-03-21 RX ADMIN — INSULIN ASPART SCH UNIT: 100 INJECTION, SOLUTION INTRAVENOUS; SUBCUTANEOUS at 17:27

## 2019-03-21 RX ADMIN — CARVEDILOL SCH MG: 6.25 TABLET, FILM COATED ORAL at 17:27

## 2019-03-21 RX ADMIN — INSULIN ASPART SCH UNIT: 100 INJECTION, SOLUTION INTRAVENOUS; SUBCUTANEOUS at 12:46

## 2019-03-21 RX ADMIN — INSULIN ASPART SCH UNIT: 100 INJECTION, SOLUTION INTRAVENOUS; SUBCUTANEOUS at 22:16

## 2019-03-21 RX ADMIN — LISINOPRIL SCH MG: 5 TABLET ORAL at 22:16

## 2019-03-21 RX ADMIN — ASPIRIN 81 MG CHEWABLE TABLET SCH MG: 81 TABLET CHEWABLE at 09:37

## 2019-03-21 RX ADMIN — CARVEDILOL SCH MG: 6.25 TABLET, FILM COATED ORAL at 07:05

## 2019-03-21 RX ADMIN — HEPARIN SODIUM SCH UNIT: 5000 INJECTION, SOLUTION INTRAVENOUS; SUBCUTANEOUS at 09:37

## 2019-03-21 RX ADMIN — HEPARIN SODIUM SCH UNIT: 5000 INJECTION, SOLUTION INTRAVENOUS; SUBCUTANEOUS at 17:27

## 2019-03-21 RX ADMIN — ATORVASTATIN CALCIUM SCH MG: 20 TABLET, FILM COATED ORAL at 09:37

## 2019-03-21 RX ADMIN — HEPARIN SODIUM SCH UNIT: 5000 INJECTION, SOLUTION INTRAVENOUS; SUBCUTANEOUS at 22:16

## 2019-03-21 RX ADMIN — FUROSEMIDE SCH MG: 80 TABLET ORAL at 09:37

## 2019-03-21 RX ADMIN — LINAGLIPTIN SCH MG: 5 TABLET, FILM COATED ORAL at 09:37

## 2019-03-21 RX ADMIN — INSULIN ASPART SCH UNIT: 100 INJECTION, SOLUTION INTRAVENOUS; SUBCUTANEOUS at 07:05

## 2019-03-21 RX ADMIN — ISOSORBIDE MONONITRATE SCH MG: 30 TABLET, EXTENDED RELEASE ORAL at 09:37

## 2019-03-21 NOTE — PN
PROGRESS NOTE



Mr. Lujan is a gentleman with recent diagnosis of severe heart failure and CKD.  This

morning he feels somewhat better.  He clinically looks better.  His edema is less.  His

breathing is easier.



Vital signs are stable. There is JVD of 1 cm. No carotid bruit. S1, S2 heard normally.

Short systolic murmur noted. Lungs reveal improved air entry. Abdomen is soft,

nontender.  Lower extremity edema has improved.



I am recommending that we add a small dose of Zaroxolyn, leave him on oral Lasix today

and increase activity and hopefully he can be discharged tomorrow if he remains stable.

We will also supplement his potassium and watch his creatinine closely.  Overall, he is

doing better.  His renal function also appears to be holding up well.  He is now on

Lasix 80 mg in the morning, 40 in the evening and we will continue this.





MMODL / IJN: [id number] / Job#: [id number]

## 2019-03-21 NOTE — P.PN
Subjective


Progress Note Date: 03/21/19


Principal diagnosis: 





CHF exacerbation





Patient seen and examined.  No acute events overnight.  Patient reports slight 

improvement in his breathing. He denies chest pain or palpitations.  Change to 

by mouth Lasix today.





Objective





- Vital Signs


Vital signs: 


                                   Vital Signs











Temp  97.7 F   03/21/19 16:00


 


Pulse  83   03/21/19 16:00


 


Resp  18   03/21/19 16:00


 


BP  103/53   03/21/19 16:00


 


Pulse Ox  98   03/21/19 16:00








                                 Intake & Output











 03/20/19 03/21/19 03/21/19





 18:59 06:59 18:59


 


Intake Total 380 30 290


 


Output Total 


 


Balance -120 -260 -885


 


Weight  58.9 kg 


 


Intake:   


 


  IV 20 30 10


 


    Invasive Line 2 20 30 10


 


  Oral 360  280


 


Output:   


 


  Urine 


 


Other:   


 


  Voiding Method Toilet Toilet Urinal


 


  # Voids 1 1 1


 


  # Bowel Movements 0 0 0














- Exam





General: [non toxic], [no distress], [appears at stated age]


Derm: [warm], [dry]


Head: [atraumatic], [normocephalic], [symmetric]


Eyes: [EOMI], [no lid lag], [anicteric sclera]


Mouth: [no lip lesion], [mucus membranes moist]


Cardiovascular: [S1S2 reg], [no murmur], [positive DP bilateral]


Lungs: [Decreased breath sounds bilateral], [no rhonchi, no rales] , [no 

accessory muscle use]


Abdominal: [soft], [ nontender to palpation], [no guarding], [no appreciable 

organomegaly]


Ext: [no gross muscle atrophy], [1+ bilateral lower extremity edema], [no 

contractures]


Neuro:  [no focal neuro deficits]


Psych: [Alert], [oriented], [appropriate affect] 





- Labs


CBC & Chem 7: 


                                 03/16/19 06:45





                                 03/21/19 06:16


Labs: 


                  Abnormal Lab Results - Last 24 Hours (Table)











  03/20/19 03/20/19 03/21/19 Range/Units





  16:55 20:26 06:16 


 


BUN    50 H  (9-20)  mg/dL


 


Creatinine    2.58 H  (0.66-1.25)  mg/dL


 


Glucose    134 H  (74-99)  mg/dL


 


POC Glucose (mg/dL)  117 H  286 H   (75-99)  mg/dL














  03/21/19 03/21/19 Range/Units





  06:17 11:18 


 


BUN    (9-20)  mg/dL


 


Creatinine    (0.66-1.25)  mg/dL


 


Glucose    (74-99)  mg/dL


 


POC Glucose (mg/dL)  153 H  172 H  (75-99)  mg/dL














Assessment and Plan


Assessment: 





Assessment and Plan





1.  CHF exacerbation


2.  Troponin elevation


3.  CKD stage III


4.  Diabetes mellitus


5.  Hypertension


6.  DVT and GI prophylaxis





1.  BNP elevation to 32,700.  Chest x-ray shows cardiomegaly and mild heart 

failure.  Venous duplex is negative for DVT.  IV Lasix discontinued, start Lasix

80 mg in the morning and 60 mg at night.  Echocardiogram shows EF less than 20%.

 Continue hydralazine and Imdur.  Continue Coreg and lisinopril.  Strict in's 

and outs.  Daily weights.  Telemetry monitoring.  Will follow cardiology 

recommendations.  


2.  Troponin 0.048, 0.1312 with EKG showing sinus tachycardia and low voltage 

QRS.  Likely leak from CHF.  Telemetry monitoring.  Continue aspirin and 

Lipitor.  Will follow cardiology recommendations.


3.  BUN 53 to 50, creatinine 2.67 to 2.58, slight improvement. Avoid 

nephrotoxins.  Monitor and replace electrolytes.  We'll avoid IVF due to fluid 

overloaded state.  We'll follow nephrology recommendations.


4.  Point-of-care glucose 172.  Continue Levemir 10 units at bedtime.  Regular 

Accu-Cheks.  Hypoglycemic precautions.


5.  /53.  Continue hydralazine 25 mg by mouth twice a day, Imdur 15 mg by 

mouth daily.  Continue lisinopril 5 mg by mouth daily.  Continue Coreg 6.25 mg 

by mouth  Monitor vitals, adjust medications as necessary.


6.  Heparin 5000 units subcutaneously 3 times a day.





Patient admitted for CHF exacerbation.  Transition to by mouth Lasix.  

Monitoring renal function as well.  Will monitor 1 more day as per cardiology 

recommendations, plans for discharge tomorrow hopefully.

## 2019-03-21 NOTE — P.PN
Subjective





Patient is seen in follow-up for acute kidney injury on chronic kidney disease. 

Admits to good urine output.  No vomiting or diarrhea.  Patient has systolic CHF

with ejection fraction of near 20%.  Renal function is stable.  Edema is 

improving.  He is now on oral diuretics.





Vital signs are stable.


General: The patient appeared well nourished and normally developed. 


HEENT: Head exam is unremarkable. Neck is without jugular venous distension.


LUNGS: Breath sounds decreased.


HEART: Rate and Rhythm are regular. First and second heart sounds normal. No 

murmurs, rubs or gallops. 


ABDOMEN: Abdominal exam reveals normal bowel sounds. Non-tender and non-

distended. No evidence of peritonitis.


EXTREMITITES: 1+ edema.





Objective





- Vital Signs


Vital signs: 


                                   Vital Signs











Temp  98.3 F   03/21/19 04:00


 


Pulse  83   03/21/19 04:00


 


Resp  18   03/21/19 04:00


 


BP  131/84   03/21/19 04:00


 


Pulse Ox  95   03/21/19 09:20








                                 Intake & Output











 03/20/19 03/21/19 03/21/19





 18:59 06:59 18:59


 


Intake Total 380 30 


 


Output Total 500 900 125


 


Balance -120 -870 -125


 


Weight  58.9 kg 


 


Intake:   


 


  IV 20 30 


 


    Invasive Line 2 20 30 


 


  Oral 360  


 


Output:   


 


  Urine 500 900 125


 


Other:   


 


  Voiding Method Toilet Toilet 


 


  # Voids 1 1 


 


  # Bowel Movements 0 0 














- Labs


CBC & Chem 7: 


                                 03/16/19 06:45





                                 03/21/19 06:16


Labs: 


                  Abnormal Lab Results - Last 24 Hours (Table)











  03/20/19 03/20/19 03/20/19 Range/Units





  11:52 16:55 20:26 


 


BUN     (9-20)  mg/dL


 


Creatinine     (0.66-1.25)  mg/dL


 


Glucose     (74-99)  mg/dL


 


POC Glucose (mg/dL)  246 H  117 H  286 H  (75-99)  mg/dL














  03/21/19 03/21/19 Range/Units





  06:16 06:17 


 


BUN  50 H   (9-20)  mg/dL


 


Creatinine  2.58 H   (0.66-1.25)  mg/dL


 


Glucose  134 H   (74-99)  mg/dL


 


POC Glucose (mg/dL)   153 H  (75-99)  mg/dL














Assessment and Plan


Plan: 





Assessment:


1.  Acute kidney injury secondary to cardiorenal syndrome.  Renal function 

slightly improved.


2.  Chronic kidney disease stage IV secondary to diabetic kidney disease and 

cardiac renal syndrome with baseline creatinine near 2.


3.  Systolic CHF with ejection fraction of 20%.


4.  Volume overload.  Improving with diuresis.


5.  Hypertension with chronic kidney disease.  Controlled.





Plan:


Maintain oral diuretics.


I advised the patient to follow a low-salt diet as well as a 50-60 oz fluid 

restriction upon discharge.


He will need to follow-up as an outpatient in 1-2 weeks.

## 2019-03-22 VITALS — TEMPERATURE: 97.6 F

## 2019-03-22 VITALS — HEART RATE: 79 BPM | DIASTOLIC BLOOD PRESSURE: 66 MMHG | SYSTOLIC BLOOD PRESSURE: 112 MMHG

## 2019-03-22 LAB
ANION GAP SERPL CALC-SCNC: 9 MMOL/L
BUN SERPL-SCNC: 55 MG/DL (ref 9–20)
CALCIUM SPEC-MCNC: 8.7 MG/DL (ref 8.4–10.2)
CHLORIDE SERPL-SCNC: 101 MMOL/L (ref 98–107)
CO2 SERPL-SCNC: 30 MMOL/L (ref 22–30)
GLUCOSE BLD-MCNC: 212 MG/DL (ref 75–99)
GLUCOSE BLD-MCNC: 235 MG/DL (ref 75–99)
GLUCOSE SERPL-MCNC: 243 MG/DL (ref 74–99)
POTASSIUM SERPL-SCNC: 4.3 MMOL/L (ref 3.5–5.1)
SODIUM SERPL-SCNC: 140 MMOL/L (ref 137–145)

## 2019-03-22 RX ADMIN — FUROSEMIDE SCH MG: 80 TABLET ORAL at 09:25

## 2019-03-22 RX ADMIN — ASPIRIN 81 MG CHEWABLE TABLET SCH MG: 81 TABLET CHEWABLE at 09:25

## 2019-03-22 RX ADMIN — CARVEDILOL SCH MG: 6.25 TABLET, FILM COATED ORAL at 06:42

## 2019-03-22 RX ADMIN — ATORVASTATIN CALCIUM SCH MG: 20 TABLET, FILM COATED ORAL at 09:25

## 2019-03-22 RX ADMIN — HEPARIN SODIUM SCH UNIT: 5000 INJECTION, SOLUTION INTRAVENOUS; SUBCUTANEOUS at 09:25

## 2019-03-22 RX ADMIN — LINAGLIPTIN SCH MG: 5 TABLET, FILM COATED ORAL at 09:25

## 2019-03-22 RX ADMIN — INSULIN ASPART SCH UNIT: 100 INJECTION, SOLUTION INTRAVENOUS; SUBCUTANEOUS at 06:42

## 2019-03-22 RX ADMIN — ISOSORBIDE MONONITRATE SCH MG: 30 TABLET, EXTENDED RELEASE ORAL at 09:25

## 2019-03-22 RX ADMIN — INSULIN ASPART SCH UNIT: 100 INJECTION, SOLUTION INTRAVENOUS; SUBCUTANEOUS at 12:32

## 2019-03-22 NOTE — P.DS
Providers


Date of admission: 


03/15/19 09:24





Expected date of discharge: 03/22/19


Attending physician: 


Louise Bonilla MD





Consults: 





                                        





03/14/19 23:21


Consult Physician Urgent 


   Consulting Provider: Volodymyr Mercado


   Consult Reason/Comments: CHF


   Do you want consulting provider notified?: Yes





03/15/19 10:41


Consult Physician Stat 


   Consulting Provider: Mona Ramsay


   Consult Reason/Comments: Renal failure


   Do you want consulting provider notified?: Yes











Primary care physician: 


Rosangela Busby MD





Hospital Course: 





73-year-old male with a PMH of type II DM, hypertension, hyperlipidemia, and CKD

stage III who presented to the ED for gradually worsening shortness of breath.  

The patient was accompanied by his wife who is a retired RN and supplemented the

history.  He notes that over the past few weeks, he noticed worsening exercise 

tolerance, and increasing lower extremity edema.  He would get short of breath 

with minimal exertion and endorsed a nonproductive cough along with orthopnea, 

and currently sleeps with 3 pillows.  The patient notes that he has never had 

such symptoms before and was never diagnosed with CHF.  He otherwise denied 

fever, chills, calf pain, recent travel, nausea, vomiting, chest pain, 

palpitations, sick contacts, headache, visual changes, abdominal pain, or 

diarrhea.





In the emergency room, the patient underwent a comprehensive workup, with WBC 

count 6.4, hemoglobin 12.7, troponins elevated at 0.048, BNP significantly 

elevated at 32,700, BUN 38, and creatinine 2.25 with a baseline of 2.3.  Chest 

x-ray revealed cardiomegaly with pulmonary venous congestion. 





With regard to his lower extremity swelling, venous duplex was negative for DVT.

 Patient was initially started on Lasix IV for diuresing and nephrology was 

consulted for his CKD.  His IV Lasix was transitioned to Lasix 80 mg by mouth in

the morning, 40 mg by mouth in the evening.  Echocardiogram was done which 

showed an EF less than 20%.  Patient was restarted on Coreg, lisinopril and 

combination of hydralazine and Imdur.





Patient showed considerable improvement in his breathing and went from 70.3 kg 

on admission to 64.3 kg on discharge.  His creatinine on admission was 2.25, and

2.58 on discharge.





Patient was seen and examined.  No acute events overnight.  Patient reports 

improvement in his breathing.  His wife reports considerable improvement in his 

lower extremity swelling.  He denies any chest pain, dizziness or shortness of 

breath.  No fever or chills.  No nausea or vomiting.  BMP is pending for today.





General: [non toxic], [no distress], [appears at stated age]


Derm: [warm], [dry]


Head: [atraumatic], [normocephalic], [symmetric]


Eyes: [EOMI], [no lid lag], [anicteric sclera]


Mouth: [no lip lesion], [mucus membranes moist]


Cardiovascular: [S1S2 reg], [no murmur], [positive DP bilateral]


Lungs: [Decreased breath sounds bilateral], [no rhonchi, no rales] , [no 

accessory muscle use]


Abdominal: [soft], [ nontender to palpation], [no guarding], [no appreciable or

ganomegaly]


Ext: [no gross muscle atrophy], [1+ bilateral lower extremity edema L > R], [no 

contractures]


Neuro:  [no focal neuro deficits]


Psych: [Alert], [oriented], [appropriate affect] 





Assessment and Plan





1.  CHF exacerbation


2.  Troponin elevation


3.  CKD stage III


4.  Diabetes mellitus


5.  Hypertension


6.  DVT and GI prophylaxis





1.  BNP elevation to 32,700.  Chest x-ray shows cardiomegaly and mild heart 

failure.  Venous duplex is negative for DVT.  IV Lasix discontinued, start Lasix

80 mg in the morning and 60 mg at night.  Echocardiogram shows EF less than 20%.

 Continue hydralazine and Imdur.  Continue Coreg and lisinopril.  Strict in's 

and outs.  Daily weights.  Telemetry monitoring.  Will follow cardiology 

recommendations.  


2.  Troponin 0.048, 0.1312 with EKG showing sinus tachycardia and low voltage 

QRS.  Likely leak from CHF.  Telemetry monitoring.  Continue aspirin and 

Lipitor.  Will follow cardiology recommendations.


3.  BUN 53 to 50, creatinine 2.67 to 2.58, slight improvement. Avoid 

nephrotoxins.  Monitor and replace electrolytes.  We'll avoid IVF due to fluid 

overloaded state.  We'll follow nephrology recommendations.


4.  Point-of-care glucose 235.  Continue Levemir 10 units at bedtime.  Regular 

Accu-Cheks.  Hypoglycemic precautions.


5.  /82.  Continue hydralazine 25 mg by mouth twice a day, Imdur 15 mg by 

mouth daily.  Continue lisinopril 5 mg by mouth daily.  Continue Coreg 6.25 mg 

by mouth  Monitor vitals, adjust medications as necessary.


6.  Heparin 5000 units subcutaneously 3 times a day.





Patient admitted for CHF exacerbation.  Transition to by mouth Lasix.  

Monitoring renal function as well.  DC today pending BMP results.


Pertinent Studies: 





Chest x-ray, echocardiogram, venous duplex


Patient Condition at Discharge: Fair





Plan - Discharge Summary


Discharge Rx Participant: No


New Discharge Prescriptions: 


New


   hydrALAZINE HCL [Apresoline] 25 mg PO BID #60 tab


   Aspirin 81 mg PO DAILY #30 chew


   Carvedilol [Coreg] 6.25 mg PO BID-W/MEALS #60 tab


   Isosorbide Mononitrate ER [Imdur] 15 mg PO DAILY #30 dose


   Furosemide [Lasix] 40 mg PO 1500 #30 tab


   Furosemide [Lasix] 80 mg PO AC-BRKFST #30 tab


   Metolazone [Zaroxolyn] 2.5 mg PO DAILY #30 tab


   Lisinopril [Zestril] 5 mg PO HS #30 tab





Continue


   Simvastatin [Zocor] 40 mg PO DAILY


   sitaGLIPtin [Januvia] 50 mg PO DAILY


   glipiZIDE [Glucotrol] 10 mg PO BID


   Ergocalciferol [Vitamin D2 (DRISDOL)] 50,000 unit PO Q14D


   Sodium Chloride [Saline Nasal Spray] 1 spray EA NOSTRIL DAILY PRN


     PRN Reason: DRY NOSE





Discontinued


   amLODIPine [Norvasc] 5 mg PO DAILY


   Telmisartan [Micardis] 40 mg PO DAILY


Discharge Medication List





Simvastatin [Zocor] 40 mg PO DAILY 12/10/18 [History]


glipiZIDE [Glucotrol] 10 mg PO BID 12/10/18 [History]


sitaGLIPtin [Januvia] 50 mg PO DAILY 12/10/18 [History]


Ergocalciferol [Vitamin D2 (DRISDOL)] 50,000 unit PO Q14D 03/14/19 [History]


Sodium Chloride [Saline Nasal Spray] 1 spray EA NOSTRIL DAILY PRN 03/14/19 

[History]


Aspirin 81 mg PO DAILY #30 chew 03/22/19 [Rx]


Carvedilol [Coreg] 6.25 mg PO BID-W/MEALS #60 tab 03/22/19 [Rx]


Furosemide [Lasix] 40 mg PO 1500 #30 tab 03/22/19 [Rx]


Furosemide [Lasix] 80 mg PO AC-BRKFST #30 tab 03/22/19 [Rx]


Isosorbide Mononitrate ER [Imdur] 15 mg PO DAILY #30 dose 03/22/19 [Rx]


Lisinopril [Zestril] 5 mg PO HS #30 tab 03/22/19 [Rx]


Metolazone [Zaroxolyn] 2.5 mg PO DAILY #30 tab 03/22/19 [Rx]


hydrALAZINE HCL [Apresoline] 25 mg PO BID #60 tab 03/22/19 [Rx]








Follow up Appointment(s)/Referral(s): 


Rosangela Busby MD [Primary Care Provider] - 03/25/19 3:00 pm (Monday)


[facility], [NON-STAFF] - 


Aguilar Camejo MD [STAFF PHYSICIAN] - 1 Week


Patient Instructions/Handouts:  Hydralazine (By mouth), Dobutamine (By 

injection), Isosorbide Mononitrate (By mouth), Heart Failure (DC)


Activity/Diet/Wound Care/Special Instructions: 


Diet: Heart healthy, low-salt


Please follow low-salt diet, 50-60 ounces fluid restriction.


Take all medications as advised.


Follow-up with PCP within 1-2 days of discharge.


Follow-up with cardiology within 1 week of discharge.


Discharge Disposition: HOME SELF-CARE

## 2019-03-22 NOTE — P.PN
Subjective


Progress Note Date: 03/22/19


Is a 73-year-old  gentleman with history of diabetes, hypertension, 

hyperlipidemia, renal failure, admitted to the hospital with congestive cardiac 

failure.  He has been diuresed well, today he's been up ambulating with physical

therapy tolerating it well.  Hemodynamically he has been stable.  Blood pressure

104/60 with a heart rate in the 70s, 96% on room air.  Sodium 140, potassium 

4.3, BUN 55 and creatinine 2.5.





Objective





- Vital Signs


Vital signs: 


                                   Vital Signs











Temp  97.6 F   03/22/19 08:00


 


Pulse  76   03/22/19 08:00


 


Resp  18   03/22/19 08:00


 


BP  105/65   03/22/19 08:00


 


Pulse Ox  96   03/22/19 09:18








                                 Intake & Output











 03/21/19 03/22/19 03/22/19





 18:59 06:59 18:59


 


Intake Total 290  


 


Output Total 1175 1250 


 


Balance -885 -1250 


 


Weight  64.3 kg 64.3 kg


 


Intake:   


 


  IV 10  


 


    Invasive Line 2 10  


 


  Oral 280  


 


Output:   


 


  Urine 1175 1250 


 


Other:   


 


  Voiding Method Urinal Urinal Urinal


 


  # Voids 1 1 


 


  # Bowel Movements 0 0 














- Exam





GENERAL EXAM: Patient is alert and oriented and in mild to moderate respiratory 

distress


HEENT: Normocephalic. Normal reaction of pupils, equal size, normal range of 

extraocular motion. No erythema or exudates in the throat.


NECK: No masses, no nuchal rigidity.  Increased JVD


CHEST: No chest wall deformity.


LUNGS: Diminished breath sounds at bases


HEART: S1 and S2 normal 


ABDOMEN: No hepatosplenomegaly, normal bowel sounds, no guarding or rigidity.


SKIN: No rashes


CENTRAL NERVOUS SYSTEM: No focal deficits.


EXTREMITIES: Significant bilateral edema








- Labs


CBC & Chem 7: 


                                 03/16/19 06:45





                                 03/22/19 10:59


Labs: 


                  Abnormal Lab Results - Last 24 Hours (Table)











  03/21/19 03/21/19 03/22/19 Range/Units





  17:14 20:20 06:03 


 


BUN     (9-20)  mg/dL


 


Creatinine     (0.66-1.25)  mg/dL


 


Glucose     (74-99)  mg/dL


 


POC Glucose (mg/dL)  162 H  203 H  235 H  (75-99)  mg/dL














  03/22/19 03/22/19 Range/Units





  10:59 11:52 


 


BUN  55 H   (9-20)  mg/dL


 


Creatinine  2.54 H   (0.66-1.25)  mg/dL


 


Glucose  243 H   (74-99)  mg/dL


 


POC Glucose (mg/dL)   212 H  (75-99)  mg/dL














Assessment and Plan


Plan: 


Assessment and plan


#1 systolic congestive heart failure acute on chronic


#2 ischemic cardio myopathy


#3 chronic renal failure


#4 diabetes





Plan


From cardiology's perspective, patient may be able to be discharged home today. 

We will make him a follow-up appointment to see Dr. Wisdom in the office 

post discharge.





DNP note has been reviewed, I agree with a documented findings and plan of care.

 Patient was seen and examined.

## 2019-03-22 NOTE — P.PN
Subjective





Patient is seen in follow-up for acute kidney injury on chronic kidney disease. 

Admits to good urine output.  No vomiting or diarrhea.  Patient has systolic CHF

with ejection fraction of near 20%.  Renal function is stable.  Edema is 

improving.  He is now on oral diuretics.  No active complaints at this time.





Vital signs are stable.


General: The patient appeared well nourished and normally developed. 


HEENT: Head exam is unremarkable. Neck is without jugular venous distension.


LUNGS: Breath sounds decreased.


HEART: Rate and Rhythm are regular. First and second heart sounds normal. No 

murmurs, rubs or gallops. 


ABDOMEN: Abdominal exam reveals normal bowel sounds. Non-tender and non-

distended. No evidence of peritonitis.


EXTREMITITES: 1+ edema.





Objective





- Vital Signs


Vital signs: 


                                   Vital Signs











Temp  97.4 F L  03/22/19 04:00


 


Pulse  85   03/22/19 04:00


 


Resp  18   03/22/19 04:00


 


BP  133/82   03/22/19 04:00


 


Pulse Ox  98   03/22/19 04:00








                                 Intake & Output











 03/21/19 03/22/19 03/22/19





 18:59 06:59 18:59


 


Intake Total 290  


 


Output Total 1175 1250 


 


Balance -885 -1250 


 


Weight  64.3 kg 


 


Intake:   


 


  IV 10  


 


    Invasive Line 2 10  


 


  Oral 280  


 


Output:   


 


  Urine 1175 1250 


 


Other:   


 


  Voiding Method Urinal Urinal 


 


  # Voids 1 1 


 


  # Bowel Movements 0 0 














- Labs


CBC & Chem 7: 


                                 03/16/19 06:45





                                 03/21/19 06:16


Labs: 


                  Abnormal Lab Results - Last 24 Hours (Table)











  03/21/19 03/21/19 03/21/19 Range/Units





  11:18 17:14 20:20 


 


POC Glucose (mg/dL)  172 H  162 H  203 H  (75-99)  mg/dL














  03/22/19 Range/Units





  06:03 


 


POC Glucose (mg/dL)  235 H  (75-99)  mg/dL














Assessment and Plan


Plan: 





Assessment:


1.  Acute kidney injury secondary to cardiorenal syndrome.  Renal function is 

relatively stable the last few days.


2.  Chronic kidney disease stage IV secondary to diabetic kidney disease and 

cardiac renal syndrome with baseline creatinine near 2.


3.  Systolic CHF with ejection fraction of 20%.


4.  Volume overload.  Improving with diuresis.


5.  Hypertension with chronic kidney disease.  Controlled.





Plan:


Maintain oral diuretics.


I advised the patient to follow a low-salt diet as well as a 50-60 oz fluid 

restriction upon discharge.


He will need to follow-up as an outpatient in 1-2 weeks.

## 2019-03-28 ENCOUNTER — HOSPITAL ENCOUNTER (OUTPATIENT)
Dept: HOSPITAL 47 - LABWHC1 | Age: 74
Discharge: HOME | End: 2019-03-28
Payer: MEDICARE

## 2019-03-28 DIAGNOSIS — I13.0: Primary | ICD-10-CM

## 2019-03-28 DIAGNOSIS — I50.23: ICD-10-CM

## 2019-03-28 LAB
ANION GAP SERPL CALC-SCNC: 7.4 MMOL/L (ref 4–12)
BUN SERPL-SCNC: 66 MG/DL (ref 9–27)
CALCIUM SPEC-MCNC: 9.6 MG/DL (ref 8.7–10.3)
CHLORIDE SERPL-SCNC: 96 MMOL/L (ref 96–109)
CO2 SERPL-SCNC: 35.6 MMOL/L (ref 21.6–31.8)
GLUCOSE SERPL-MCNC: 211 MG/DL (ref 70–110)
POTASSIUM SERPL-SCNC: 5 MMOL/L (ref 3.5–5.5)
SODIUM SERPL-SCNC: 139 MMOL/L (ref 135–145)

## 2019-03-28 PROCEDURE — 36415 COLL VENOUS BLD VENIPUNCTURE: CPT

## 2019-03-28 PROCEDURE — 80048 BASIC METABOLIC PNL TOTAL CA: CPT

## 2019-04-09 ENCOUNTER — HOSPITAL ENCOUNTER (OUTPATIENT)
Dept: HOSPITAL 47 - LABWHC1 | Age: 74
Discharge: HOME | End: 2019-04-09
Attending: INTERNAL MEDICINE
Payer: MEDICARE

## 2019-04-09 DIAGNOSIS — E55.9: ICD-10-CM

## 2019-04-09 DIAGNOSIS — M10.9: ICD-10-CM

## 2019-04-09 DIAGNOSIS — D63.1: ICD-10-CM

## 2019-04-09 DIAGNOSIS — N18.4: Primary | ICD-10-CM

## 2019-04-09 LAB
ALBUMIN SERPL-MCNC: 4.4 G/DL (ref 3.8–4.9)
ANION GAP SERPL CALC-SCNC: 8.4 MMOL/L (ref 4–12)
BASOPHILS # BLD AUTO: 0 K/UL (ref 0–0.2)
BASOPHILS NFR BLD AUTO: 1 %
BUN SERPL-SCNC: 91 MG/DL (ref 9–27)
CALCIUM SPEC-MCNC: 9.6 MG/DL (ref 8.7–10.3)
CHLORIDE SERPL-SCNC: 97 MMOL/L (ref 96–109)
CO2 SERPL-SCNC: 33.6 MMOL/L (ref 21.6–31.8)
EOSINOPHIL # BLD AUTO: 0.3 K/UL (ref 0–0.7)
EOSINOPHIL NFR BLD AUTO: 6 %
ERYTHROCYTE [DISTWIDTH] IN BLOOD BY AUTOMATED COUNT: 4.09 M/UL (ref 4.3–5.9)
ERYTHROCYTE [DISTWIDTH] IN BLOOD: 13.3 % (ref 11.5–15.5)
GLUCOSE SERPL-MCNC: 290 MG/DL (ref 70–110)
HCT VFR BLD AUTO: 39.6 % (ref 39–53)
HGB BLD-MCNC: 12.8 GM/DL (ref 13–17.5)
LYMPHOCYTES # SPEC AUTO: 0.8 K/UL (ref 1–4.8)
LYMPHOCYTES NFR SPEC AUTO: 16 %
MAGNESIUM SPEC-SCNC: 2.6 MG/DL (ref 1.5–2.4)
MCH RBC QN AUTO: 31.2 PG (ref 25–35)
MCHC RBC AUTO-ENTMCNC: 32.2 G/DL (ref 31–37)
MCV RBC AUTO: 96.8 FL (ref 80–100)
MONOCYTES # BLD AUTO: 0.3 K/UL (ref 0–1)
MONOCYTES NFR BLD AUTO: 6 %
NEUTROPHILS # BLD AUTO: 3.2 K/UL (ref 1.3–7.7)
NEUTROPHILS NFR BLD AUTO: 68 %
PLATELET # BLD AUTO: 167 K/UL (ref 150–450)
POTASSIUM SERPL-SCNC: 4.5 MMOL/L (ref 3.5–5.5)
PTH-INTACT SERPL-MCNC: 86 PG/ML (ref 14–72)
SODIUM SERPL-SCNC: 139 MMOL/L (ref 135–145)
URATE SERPL-MCNC: 11.7 MG/DL (ref 3.7–8.7)
WBC # BLD AUTO: 4.8 K/UL (ref 3.8–10.6)

## 2019-04-09 PROCEDURE — 85025 COMPLETE CBC W/AUTO DIFF WBC: CPT

## 2019-04-09 PROCEDURE — 84100 ASSAY OF PHOSPHORUS: CPT

## 2019-04-09 PROCEDURE — 83735 ASSAY OF MAGNESIUM: CPT

## 2019-04-09 PROCEDURE — 82728 ASSAY OF FERRITIN: CPT

## 2019-04-09 PROCEDURE — 82040 ASSAY OF SERUM ALBUMIN: CPT

## 2019-04-09 PROCEDURE — 80048 BASIC METABOLIC PNL TOTAL CA: CPT

## 2019-04-09 PROCEDURE — 83550 IRON BINDING TEST: CPT

## 2019-04-09 PROCEDURE — 36415 COLL VENOUS BLD VENIPUNCTURE: CPT

## 2019-04-09 PROCEDURE — 84550 ASSAY OF BLOOD/URIC ACID: CPT

## 2019-04-09 PROCEDURE — 83540 ASSAY OF IRON: CPT

## 2019-04-09 PROCEDURE — 82306 VITAMIN D 25 HYDROXY: CPT

## 2019-04-09 PROCEDURE — 83970 ASSAY OF PARATHORMONE: CPT

## 2019-04-30 ENCOUNTER — HOSPITAL ENCOUNTER (OUTPATIENT)
Dept: HOSPITAL 47 - LABWHC1 | Age: 74
End: 2019-04-30
Attending: INTERNAL MEDICINE
Payer: MEDICARE

## 2019-04-30 DIAGNOSIS — N39.0: ICD-10-CM

## 2019-04-30 DIAGNOSIS — N25.81: ICD-10-CM

## 2019-04-30 DIAGNOSIS — N18.4: ICD-10-CM

## 2019-04-30 DIAGNOSIS — R80.9: ICD-10-CM

## 2019-04-30 DIAGNOSIS — M10.9: ICD-10-CM

## 2019-04-30 DIAGNOSIS — D63.1: ICD-10-CM

## 2019-04-30 DIAGNOSIS — E55.9: Primary | ICD-10-CM

## 2019-04-30 LAB
ALBUMIN SERPL-MCNC: 4.3 G/DL (ref 3.8–4.9)
ANION GAP SERPL CALC-SCNC: 10.5 MMOL/L (ref 4–12)
BASOPHILS # BLD AUTO: 0 K/UL (ref 0–0.2)
BASOPHILS NFR BLD AUTO: 0 %
BUN SERPL-SCNC: 65 MG/DL (ref 9–27)
BUN/CREAT SERPL: 26 RATIO (ref 12–20)
CALCIUM SPEC-MCNC: 9.1 MG/DL (ref 8.7–10.3)
CHLORIDE SERPL-SCNC: 102 MMOL/L (ref 96–109)
CO2 SERPL-SCNC: 27.5 MMOL/L (ref 21.6–31.8)
EOSINOPHIL # BLD AUTO: 0.4 K/UL (ref 0–0.7)
EOSINOPHIL NFR BLD AUTO: 7 %
ERYTHROCYTE [DISTWIDTH] IN BLOOD BY AUTOMATED COUNT: 3.79 M/UL (ref 4.3–5.9)
ERYTHROCYTE [DISTWIDTH] IN BLOOD: 13.4 % (ref 11.5–15.5)
GLUCOSE SERPL-MCNC: 275 MG/DL (ref 70–110)
GLUCOSE UR QL: (no result)
HCT VFR BLD AUTO: 36.9 % (ref 39–53)
HGB BLD-MCNC: 12 GM/DL (ref 13–17.5)
LYMPHOCYTES # SPEC AUTO: 0.8 K/UL (ref 1–4.8)
LYMPHOCYTES NFR SPEC AUTO: 14 %
MAGNESIUM SPEC-SCNC: 2.1 MG/DL (ref 1.5–2.4)
MCH RBC QN AUTO: 31.7 PG (ref 25–35)
MCHC RBC AUTO-ENTMCNC: 32.5 G/DL (ref 31–37)
MCV RBC AUTO: 97.5 FL (ref 80–100)
MONOCYTES # BLD AUTO: 0.2 K/UL (ref 0–1)
MONOCYTES NFR BLD AUTO: 4 %
NEUTROPHILS # BLD AUTO: 3.9 K/UL (ref 1.3–7.7)
NEUTROPHILS NFR BLD AUTO: 72 %
PH UR: 5 [PH] (ref 5–8)
PLATELET # BLD AUTO: 144 K/UL (ref 150–450)
POTASSIUM SERPL-SCNC: 4 MMOL/L (ref 3.5–5.5)
PTH-INTACT SERPL-MCNC: 94.3 PG/ML (ref 14–72)
SODIUM SERPL-SCNC: 140 MMOL/L (ref 135–145)
SP GR UR: 1.01 (ref 1–1.03)
URATE SERPL-MCNC: 9.7 MG/DL (ref 3.7–8.7)
UROBILINOGEN UR QL STRIP: <2 MG/DL (ref ?–2)
WBC # BLD AUTO: 5.5 K/UL (ref 3.8–10.6)

## 2019-04-30 PROCEDURE — 85025 COMPLETE CBC W/AUTO DIFF WBC: CPT

## 2019-04-30 PROCEDURE — 83735 ASSAY OF MAGNESIUM: CPT

## 2019-04-30 PROCEDURE — 80048 BASIC METABOLIC PNL TOTAL CA: CPT

## 2019-04-30 PROCEDURE — 82306 VITAMIN D 25 HYDROXY: CPT

## 2019-04-30 PROCEDURE — 84100 ASSAY OF PHOSPHORUS: CPT

## 2019-04-30 PROCEDURE — 82040 ASSAY OF SERUM ALBUMIN: CPT

## 2019-04-30 PROCEDURE — 81003 URINALYSIS AUTO W/O SCOPE: CPT

## 2019-04-30 PROCEDURE — 82728 ASSAY OF FERRITIN: CPT

## 2019-04-30 PROCEDURE — 82570 ASSAY OF URINE CREATININE: CPT

## 2019-04-30 PROCEDURE — 83970 ASSAY OF PARATHORMONE: CPT

## 2019-04-30 PROCEDURE — 84550 ASSAY OF BLOOD/URIC ACID: CPT

## 2019-04-30 PROCEDURE — 84156 ASSAY OF PROTEIN URINE: CPT

## 2019-04-30 PROCEDURE — 36415 COLL VENOUS BLD VENIPUNCTURE: CPT

## 2019-04-30 PROCEDURE — 83550 IRON BINDING TEST: CPT

## 2019-04-30 PROCEDURE — 83540 ASSAY OF IRON: CPT

## 2019-06-11 ENCOUNTER — HOSPITAL ENCOUNTER (OUTPATIENT)
Dept: HOSPITAL 47 - LABWHC1 | Age: 74
Discharge: HOME | End: 2019-06-11
Attending: NURSE PRACTITIONER
Payer: MEDICARE

## 2019-06-11 DIAGNOSIS — N18.4: ICD-10-CM

## 2019-06-11 DIAGNOSIS — E11.22: Primary | ICD-10-CM

## 2019-06-11 DIAGNOSIS — E11.65: ICD-10-CM

## 2019-06-11 LAB
ANION GAP SERPL CALC-SCNC: 7 MMOL/L (ref 4–12)
BUN SERPL-SCNC: 55 MG/DL (ref 9–27)
BUN/CREAT SERPL: 19.64 RATIO (ref 12–20)
CALCIUM SPEC-MCNC: 9.1 MG/DL (ref 8.7–10.3)
CHLORIDE SERPL-SCNC: 103 MMOL/L (ref 96–109)
CO2 SERPL-SCNC: 30 MMOL/L (ref 21.6–31.8)
GLUCOSE SERPL-MCNC: 275 MG/DL (ref 70–110)
HBA1C MFR BLD: 8.8 % (ref 4–6)
POTASSIUM SERPL-SCNC: 4.3 MMOL/L (ref 3.5–5.5)
SODIUM SERPL-SCNC: 140 MMOL/L (ref 135–145)

## 2019-06-11 PROCEDURE — 36415 COLL VENOUS BLD VENIPUNCTURE: CPT

## 2019-06-11 PROCEDURE — 83036 HEMOGLOBIN GLYCOSYLATED A1C: CPT

## 2019-06-11 PROCEDURE — 80048 BASIC METABOLIC PNL TOTAL CA: CPT

## 2019-08-12 ENCOUNTER — HOSPITAL ENCOUNTER (OUTPATIENT)
Dept: HOSPITAL 47 - LABWHC1 | Age: 74
Discharge: HOME | End: 2019-08-12
Attending: NURSE PRACTITIONER
Payer: MEDICARE

## 2019-08-12 DIAGNOSIS — N18.4: ICD-10-CM

## 2019-08-12 DIAGNOSIS — N39.0: ICD-10-CM

## 2019-08-12 DIAGNOSIS — R80.9: ICD-10-CM

## 2019-08-12 DIAGNOSIS — D63.1: ICD-10-CM

## 2019-08-12 DIAGNOSIS — E55.9: Primary | ICD-10-CM

## 2019-08-12 DIAGNOSIS — N25.81: ICD-10-CM

## 2019-08-12 DIAGNOSIS — M10.9: ICD-10-CM

## 2019-08-12 LAB
ALBUMIN SERPL-MCNC: 4.4 G/DL (ref 3.8–4.9)
ANION GAP SERPL CALC-SCNC: 7.6 MMOL/L (ref 4–12)
BASOPHILS # BLD AUTO: 0 K/UL (ref 0–0.2)
BASOPHILS NFR BLD AUTO: 0 %
BUN SERPL-SCNC: 67 MG/DL (ref 9–27)
BUN/CREAT SERPL: 24.81 RATIO (ref 12–20)
CALCIUM SPEC-MCNC: 9 MG/DL (ref 8.7–10.3)
CHLORIDE SERPL-SCNC: 106 MMOL/L (ref 96–109)
CO2 SERPL-SCNC: 27.4 MMOL/L (ref 21.6–31.8)
EOSINOPHIL # BLD AUTO: 0.5 K/UL (ref 0–0.7)
EOSINOPHIL NFR BLD AUTO: 5 %
ERYTHROCYTE [DISTWIDTH] IN BLOOD BY AUTOMATED COUNT: 3.3 M/UL (ref 4.3–5.9)
ERYTHROCYTE [DISTWIDTH] IN BLOOD: 13.6 % (ref 11.5–15.5)
FERRITIN SERPL-MCNC: 259.1 NG/ML (ref 22–322)
GLUCOSE SERPL-MCNC: 199 MG/DL (ref 70–110)
HCT VFR BLD AUTO: 33.3 % (ref 39–53)
HGB BLD-MCNC: 11.4 GM/DL (ref 13–17.5)
IRON SERPL-MCNC: 73 UG/DL (ref 65–175)
LYMPHOCYTES # SPEC AUTO: 1 K/UL (ref 1–4.8)
LYMPHOCYTES NFR SPEC AUTO: 12 %
MAGNESIUM SPEC-SCNC: 2.4 MG/DL (ref 1.5–2.4)
MCH RBC QN AUTO: 34.4 PG (ref 25–35)
MCHC RBC AUTO-ENTMCNC: 34.1 G/DL (ref 31–37)
MCV RBC AUTO: 101 FL (ref 80–100)
MONOCYTES # BLD AUTO: 0.5 K/UL (ref 0–1)
MONOCYTES NFR BLD AUTO: 5 %
NEUTROPHILS # BLD AUTO: 6.4 K/UL (ref 1.3–7.7)
NEUTROPHILS NFR BLD AUTO: 76 %
PH UR: 5 [PH] (ref 5–8)
PLATELET # BLD AUTO: 151 K/UL (ref 150–450)
POTASSIUM SERPL-SCNC: 4.3 MMOL/L (ref 3.5–5.5)
SODIUM SERPL-SCNC: 141 MMOL/L (ref 135–145)
SP GR UR: 1.01 (ref 1–1.03)
TIBC SERPL-MCNC: 304 UG/DL (ref 228–460)
URATE SERPL-MCNC: 7.5 MG/DL (ref 3.7–8.7)
UROBILINOGEN UR QL STRIP: <2 MG/DL (ref ?–2)
WBC # BLD AUTO: 8.4 K/UL (ref 3.8–10.6)

## 2019-08-12 PROCEDURE — 85025 COMPLETE CBC W/AUTO DIFF WBC: CPT

## 2019-08-12 PROCEDURE — 84156 ASSAY OF PROTEIN URINE: CPT

## 2019-08-12 PROCEDURE — 82728 ASSAY OF FERRITIN: CPT

## 2019-08-12 PROCEDURE — 82040 ASSAY OF SERUM ALBUMIN: CPT

## 2019-08-12 PROCEDURE — 83540 ASSAY OF IRON: CPT

## 2019-08-12 PROCEDURE — 82570 ASSAY OF URINE CREATININE: CPT

## 2019-08-12 PROCEDURE — 83735 ASSAY OF MAGNESIUM: CPT

## 2019-08-12 PROCEDURE — 81003 URINALYSIS AUTO W/O SCOPE: CPT

## 2019-08-12 PROCEDURE — 84100 ASSAY OF PHOSPHORUS: CPT

## 2019-08-12 PROCEDURE — 84550 ASSAY OF BLOOD/URIC ACID: CPT

## 2019-08-12 PROCEDURE — 82306 VITAMIN D 25 HYDROXY: CPT

## 2019-08-12 PROCEDURE — 83550 IRON BINDING TEST: CPT

## 2019-08-12 PROCEDURE — 80048 BASIC METABOLIC PNL TOTAL CA: CPT

## 2019-08-12 PROCEDURE — 83970 ASSAY OF PARATHORMONE: CPT

## 2019-08-12 PROCEDURE — 36415 COLL VENOUS BLD VENIPUNCTURE: CPT

## 2019-09-25 ENCOUNTER — HOSPITAL ENCOUNTER (OUTPATIENT)
Dept: HOSPITAL 47 - LABWHC1 | Age: 74
End: 2019-09-25
Payer: MEDICARE

## 2019-09-25 DIAGNOSIS — E78.5: ICD-10-CM

## 2019-09-25 DIAGNOSIS — E11.65: Primary | ICD-10-CM

## 2019-09-25 LAB
ALT SERPL-CCNC: 30 U/L (ref 10–49)
AST SERPL-CCNC: 15 U/L (ref 14–35)
CHOLEST SERPL-MCNC: 124 MG/DL (ref 0–200)
GLUCOSE SERPL-MCNC: 235 MG/DL (ref 70–110)
HBA1C MFR BLD: 8.9 % (ref 4–6)
HDLC SERPL-MCNC: 34 MG/DL (ref 40–60)
LDLC SERPL CALC-MCNC: 50.4 MG/DL (ref 0–131)
TRIGL SERPL-MCNC: 198 MG/DL (ref 0–149)
VLDLC SERPL CALC-MCNC: 39.6 MG/DL (ref 5–40)

## 2019-09-25 PROCEDURE — 82947 ASSAY GLUCOSE BLOOD QUANT: CPT

## 2019-09-25 PROCEDURE — 84450 TRANSFERASE (AST) (SGOT): CPT

## 2019-09-25 PROCEDURE — 36415 COLL VENOUS BLD VENIPUNCTURE: CPT

## 2019-09-25 PROCEDURE — 84460 ALANINE AMINO (ALT) (SGPT): CPT

## 2019-09-25 PROCEDURE — 80061 LIPID PANEL: CPT

## 2019-09-25 PROCEDURE — 83036 HEMOGLOBIN GLYCOSYLATED A1C: CPT

## 2019-11-04 ENCOUNTER — HOSPITAL ENCOUNTER (OUTPATIENT)
Dept: HOSPITAL 47 - LABWHC1 | Age: 74
Discharge: HOME | End: 2019-11-04
Attending: NURSE PRACTITIONER
Payer: MEDICARE

## 2019-11-04 DIAGNOSIS — M10.9: ICD-10-CM

## 2019-11-04 DIAGNOSIS — N25.81: ICD-10-CM

## 2019-11-04 DIAGNOSIS — D63.1: ICD-10-CM

## 2019-11-04 DIAGNOSIS — N18.4: Primary | ICD-10-CM

## 2019-11-04 DIAGNOSIS — R80.9: ICD-10-CM

## 2019-11-04 DIAGNOSIS — N39.0: ICD-10-CM

## 2019-11-04 DIAGNOSIS — E55.9: ICD-10-CM

## 2019-11-04 LAB
ALBUMIN SERPL-MCNC: 4.8 G/DL (ref 3.8–4.9)
ANION GAP SERPL CALC-SCNC: 8.3 MMOL/L (ref 4–12)
BASOPHILS # BLD AUTO: 0 K/UL (ref 0–0.2)
BASOPHILS NFR BLD AUTO: 0 %
BUN SERPL-SCNC: 49 MG/DL (ref 9–27)
BUN/CREAT SERPL: 19.6 RATIO (ref 12–20)
CALCIUM SPEC-MCNC: 9.3 MG/DL (ref 8.7–10.3)
CHLORIDE SERPL-SCNC: 105 MMOL/L (ref 96–109)
CO2 SERPL-SCNC: 29.7 MMOL/L (ref 21.6–31.8)
EOSINOPHIL # BLD AUTO: 0.4 K/UL (ref 0–0.7)
EOSINOPHIL NFR BLD AUTO: 5 %
ERYTHROCYTE [DISTWIDTH] IN BLOOD BY AUTOMATED COUNT: 3.43 M/UL (ref 4.3–5.9)
ERYTHROCYTE [DISTWIDTH] IN BLOOD: 12.7 % (ref 11.5–15.5)
FERRITIN SERPL-MCNC: 246.6 NG/ML (ref 22–322)
GLUCOSE SERPL-MCNC: 204 MG/DL (ref 70–110)
HCT VFR BLD AUTO: 34.7 % (ref 39–53)
HGB BLD-MCNC: 11.6 GM/DL (ref 13–17.5)
IRON SERPL-MCNC: 86 UG/DL (ref 65–175)
LYMPHOCYTES # SPEC AUTO: 1.1 K/UL (ref 1–4.8)
LYMPHOCYTES NFR SPEC AUTO: 14 %
MAGNESIUM SPEC-SCNC: 2.4 MG/DL (ref 1.5–2.4)
MCH RBC QN AUTO: 33.7 PG (ref 25–35)
MCHC RBC AUTO-ENTMCNC: 33.4 G/DL (ref 31–37)
MCV RBC AUTO: 101.1 FL (ref 80–100)
MONOCYTES # BLD AUTO: 0.4 K/UL (ref 0–1)
MONOCYTES NFR BLD AUTO: 4 %
NEUTROPHILS # BLD AUTO: 6.2 K/UL (ref 1.3–7.7)
NEUTROPHILS NFR BLD AUTO: 75 %
PH UR: 5 [PH] (ref 5–8)
PLATELET # BLD AUTO: 163 K/UL (ref 150–450)
POTASSIUM SERPL-SCNC: 4.3 MMOL/L (ref 3.5–5.5)
SODIUM SERPL-SCNC: 143 MMOL/L (ref 135–145)
SP GR UR: 1.01 (ref 1–1.03)
TIBC SERPL-MCNC: 305 UG/DL (ref 228–460)
URATE SERPL-MCNC: 7 MG/DL (ref 3.7–8.7)
UROBILINOGEN UR QL STRIP: <2 MG/DL (ref ?–2)
WBC # BLD AUTO: 8.2 K/UL (ref 3.8–10.6)

## 2019-11-04 PROCEDURE — 82570 ASSAY OF URINE CREATININE: CPT

## 2019-11-04 PROCEDURE — 83550 IRON BINDING TEST: CPT

## 2019-11-04 PROCEDURE — 81003 URINALYSIS AUTO W/O SCOPE: CPT

## 2019-11-04 PROCEDURE — 83970 ASSAY OF PARATHORMONE: CPT

## 2019-11-04 PROCEDURE — 84100 ASSAY OF PHOSPHORUS: CPT

## 2019-11-04 PROCEDURE — 83735 ASSAY OF MAGNESIUM: CPT

## 2019-11-04 PROCEDURE — 85025 COMPLETE CBC W/AUTO DIFF WBC: CPT

## 2019-11-04 PROCEDURE — 36415 COLL VENOUS BLD VENIPUNCTURE: CPT

## 2019-11-04 PROCEDURE — 82728 ASSAY OF FERRITIN: CPT

## 2019-11-04 PROCEDURE — 83540 ASSAY OF IRON: CPT

## 2019-11-04 PROCEDURE — 82040 ASSAY OF SERUM ALBUMIN: CPT

## 2019-11-04 PROCEDURE — 84156 ASSAY OF PROTEIN URINE: CPT

## 2019-11-04 PROCEDURE — 84550 ASSAY OF BLOOD/URIC ACID: CPT

## 2019-11-04 PROCEDURE — 80048 BASIC METABOLIC PNL TOTAL CA: CPT

## 2019-11-04 PROCEDURE — 82306 VITAMIN D 25 HYDROXY: CPT

## 2020-04-14 ENCOUNTER — HOSPITAL ENCOUNTER (OUTPATIENT)
Dept: HOSPITAL 47 - LABWHC1 | Age: 75
Discharge: HOME | End: 2020-04-14
Attending: INTERNAL MEDICINE
Payer: MEDICARE

## 2020-04-14 DIAGNOSIS — N40.0: ICD-10-CM

## 2020-04-14 DIAGNOSIS — N18.4: ICD-10-CM

## 2020-04-14 DIAGNOSIS — M10.9: ICD-10-CM

## 2020-04-14 DIAGNOSIS — N25.81: Primary | ICD-10-CM

## 2020-04-14 DIAGNOSIS — E11.9: ICD-10-CM

## 2020-04-14 DIAGNOSIS — N39.0: ICD-10-CM

## 2020-04-14 DIAGNOSIS — R80.9: ICD-10-CM

## 2020-04-14 DIAGNOSIS — D64.9: ICD-10-CM

## 2020-04-14 LAB
ALBUMIN SERPL-MCNC: 4.4 G/DL (ref 3.8–4.9)
ANION GAP SERPL CALC-SCNC: 11.8 MMOL/L (ref 4–12)
BASOPHILS # BLD AUTO: 0 K/UL (ref 0–0.2)
BASOPHILS NFR BLD AUTO: 0 %
BUN SERPL-SCNC: 60 MG/DL (ref 9–27)
BUN/CREAT SERPL: 24 RATIO (ref 12–20)
CALCIUM SPEC-MCNC: 9.4 MG/DL (ref 8.7–10.3)
CHLORIDE SERPL-SCNC: 100 MMOL/L (ref 96–109)
CO2 SERPL-SCNC: 30.2 MMOL/L (ref 21.6–31.8)
EOSINOPHIL # BLD AUTO: 0.5 K/UL (ref 0–0.7)
EOSINOPHIL NFR BLD AUTO: 7 %
ERYTHROCYTE [DISTWIDTH] IN BLOOD BY AUTOMATED COUNT: 3.45 M/UL (ref 4.3–5.9)
ERYTHROCYTE [DISTWIDTH] IN BLOOD: 12.7 % (ref 11.5–15.5)
FERRITIN SERPL-MCNC: 254.8 NG/ML (ref 22–322)
GLUCOSE SERPL-MCNC: 319 MG/DL (ref 70–110)
GLUCOSE UR QL: (no result)
HBA1C MFR BLD: 10.7 % (ref 4–6)
HCT VFR BLD AUTO: 33.9 % (ref 39–53)
HGB BLD-MCNC: 11.4 GM/DL (ref 13–17.5)
IRON SERPL-MCNC: 70 UG/DL (ref 65–175)
LYMPHOCYTES # SPEC AUTO: 1.2 K/UL (ref 1–4.8)
LYMPHOCYTES NFR SPEC AUTO: 17 %
MAGNESIUM SPEC-SCNC: 2.2 MG/DL (ref 1.5–2.4)
MCH RBC QN AUTO: 33.1 PG (ref 25–35)
MCHC RBC AUTO-ENTMCNC: 33.7 G/DL (ref 31–37)
MCV RBC AUTO: 98.3 FL (ref 80–100)
MONOCYTES # BLD AUTO: 0.4 K/UL (ref 0–1)
MONOCYTES NFR BLD AUTO: 5 %
NEUTROPHILS # BLD AUTO: 5 K/UL (ref 1.3–7.7)
NEUTROPHILS NFR BLD AUTO: 70 %
PH UR: 6.5 [PH] (ref 5–8)
PLATELET # BLD AUTO: 182 K/UL (ref 150–450)
POTASSIUM SERPL-SCNC: 4.1 MMOL/L (ref 3.5–5.5)
PROT UR QL: (no result)
PROT/CREAT UR-RTO: 1.26
RBC UR QL: 11 /HPF (ref 0–5)
SODIUM SERPL-SCNC: 142 MMOL/L (ref 135–145)
SP GR UR: 1.01 (ref 1–1.03)
TIBC SERPL-MCNC: 308 UG/DL (ref 228–460)
URATE SERPL-MCNC: 6.5 MG/DL (ref 3.7–8.7)
UROBILINOGEN UR QL STRIP: <2 MG/DL (ref ?–2)
WBC # BLD AUTO: 7.2 K/UL (ref 3.8–10.6)
WBC #/AREA URNS HPF: 6 /HPF (ref 0–5)

## 2020-04-14 PROCEDURE — 36415 COLL VENOUS BLD VENIPUNCTURE: CPT

## 2020-04-14 PROCEDURE — 84550 ASSAY OF BLOOD/URIC ACID: CPT

## 2020-04-14 PROCEDURE — 81001 URINALYSIS AUTO W/SCOPE: CPT

## 2020-04-14 PROCEDURE — 80048 BASIC METABOLIC PNL TOTAL CA: CPT

## 2020-04-14 PROCEDURE — 82728 ASSAY OF FERRITIN: CPT

## 2020-04-14 PROCEDURE — 84100 ASSAY OF PHOSPHORUS: CPT

## 2020-04-14 PROCEDURE — 82040 ASSAY OF SERUM ALBUMIN: CPT

## 2020-04-14 PROCEDURE — 84153 ASSAY OF PSA TOTAL: CPT

## 2020-04-14 PROCEDURE — 83036 HEMOGLOBIN GLYCOSYLATED A1C: CPT

## 2020-04-14 PROCEDURE — 83540 ASSAY OF IRON: CPT

## 2020-04-14 PROCEDURE — 83970 ASSAY OF PARATHORMONE: CPT

## 2020-04-14 PROCEDURE — 82306 VITAMIN D 25 HYDROXY: CPT

## 2020-04-14 PROCEDURE — 84156 ASSAY OF PROTEIN URINE: CPT

## 2020-04-14 PROCEDURE — 85025 COMPLETE CBC W/AUTO DIFF WBC: CPT

## 2020-04-14 PROCEDURE — 82570 ASSAY OF URINE CREATININE: CPT

## 2020-04-14 PROCEDURE — 83550 IRON BINDING TEST: CPT

## 2020-04-14 PROCEDURE — 83735 ASSAY OF MAGNESIUM: CPT

## 2020-09-11 ENCOUNTER — HOSPITAL ENCOUNTER (OUTPATIENT)
Dept: HOSPITAL 47 - RADUSWWP | Age: 75
Discharge: HOME | End: 2020-09-11
Attending: INTERNAL MEDICINE
Payer: MEDICARE

## 2020-09-11 DIAGNOSIS — N18.4: Primary | ICD-10-CM

## 2020-09-11 PROCEDURE — 76770 US EXAM ABDO BACK WALL COMP: CPT

## 2020-09-11 NOTE — US
EXAMINATION TYPE: US kidneys/renal and bladder

 

DATE OF EXAM: 9/11/2020

 

COMPARISON: NONE

 

CLINICAL HISTORY: N18.4 kidney disease stage 4. CKD

 

EXAM MEASUREMENTS:

 

Right Kidney:  10.0 x 4.7 x 4.5 cm

Left Kidney: 10.1 x 4.9 x 3.7 cm

 

 

 

Right Kidney: no evidence of hydronephrosis  

Left Kidney: no evidence of hydronephrosis  

Bladder: appears wnl

**Bilateral Jets seen: no

 

 

There is no evidence for hydronephrosis at this point in time.  No nephrolithiasis is seen.  No nenita
s are identified. Cortical medullary differentiation is maintained bilaterally. The urinary bladder i
s anechoic.

 

 

IMPRESSION:

Renal sizes as described, no hydronephrosis.

## 2020-11-24 ENCOUNTER — HOSPITAL ENCOUNTER (OUTPATIENT)
Dept: HOSPITAL 47 - LABWHC1 | Age: 75
Discharge: HOME | End: 2020-11-24
Attending: NURSE PRACTITIONER
Payer: MEDICARE

## 2020-11-24 DIAGNOSIS — E21.3: ICD-10-CM

## 2020-11-24 DIAGNOSIS — D63.1: ICD-10-CM

## 2020-11-24 DIAGNOSIS — N18.4: ICD-10-CM

## 2020-11-24 DIAGNOSIS — N25.81: ICD-10-CM

## 2020-11-24 DIAGNOSIS — E55.9: ICD-10-CM

## 2020-11-24 DIAGNOSIS — N39.0: Primary | ICD-10-CM

## 2020-11-24 DIAGNOSIS — M10.9: ICD-10-CM

## 2020-11-24 LAB
ALBUMIN SERPL-MCNC: 4.2 G/DL (ref 3.8–4.9)
ANION GAP SERPL CALC-SCNC: 9.2 MMOL/L (ref 4–12)
BASOPHILS # BLD AUTO: 0 K/UL (ref 0–0.2)
BASOPHILS NFR BLD AUTO: 0 %
BUN SERPL-SCNC: 75 MG/DL (ref 9–27)
BUN/CREAT SERPL: 20.27 RATIO (ref 12–20)
CALCIUM SPEC-MCNC: 9.2 MG/DL (ref 8.7–10.3)
CHLORIDE SERPL-SCNC: 102 MMOL/L (ref 96–109)
CO2 SERPL-SCNC: 28.8 MMOL/L (ref 21.6–31.8)
EOSINOPHIL # BLD AUTO: 0.2 K/UL (ref 0–0.7)
EOSINOPHIL NFR BLD AUTO: 3 %
ERYTHROCYTE [DISTWIDTH] IN BLOOD BY AUTOMATED COUNT: 3.06 M/UL (ref 4.3–5.9)
ERYTHROCYTE [DISTWIDTH] IN BLOOD: 14.1 % (ref 11.5–15.5)
FERRITIN SERPL-MCNC: 165.9 NG/ML (ref 22–322)
GLUCOSE SERPL-MCNC: 256 MG/DL (ref 70–110)
HCT VFR BLD AUTO: 32 % (ref 39–53)
HGB BLD-MCNC: 10.3 GM/DL (ref 13–17.5)
IRON SERPL-MCNC: 84 UG/DL (ref 65–175)
LYMPHOCYTES # SPEC AUTO: 0.8 K/UL (ref 1–4.8)
LYMPHOCYTES NFR SPEC AUTO: 13 %
MAGNESIUM SPEC-SCNC: 2.3 MG/DL (ref 1.5–2.4)
MCH RBC QN AUTO: 33.7 PG (ref 25–35)
MCHC RBC AUTO-ENTMCNC: 32.2 G/DL (ref 31–37)
MCV RBC AUTO: 104.4 FL (ref 80–100)
MONOCYTES # BLD AUTO: 0.3 K/UL (ref 0–1)
MONOCYTES NFR BLD AUTO: 5 %
NEUTROPHILS # BLD AUTO: 5 K/UL (ref 1.3–7.7)
NEUTROPHILS NFR BLD AUTO: 77 %
PH UR: 5.5 [PH] (ref 5–8)
PLATELET # BLD AUTO: 147 K/UL (ref 150–450)
POTASSIUM SERPL-SCNC: 4.5 MMOL/L (ref 3.5–5.5)
PROT/CREAT UR-RTO: 0.26
SODIUM SERPL-SCNC: 140 MMOL/L (ref 135–145)
SP GR UR: 1.01 (ref 1–1.03)
TIBC SERPL-MCNC: 333 UG/DL (ref 228–460)
URATE SERPL-MCNC: 6.8 MG/DL (ref 3.7–8.7)
UROBILINOGEN UR QL STRIP: <2 MG/DL (ref ?–2)
WBC # BLD AUTO: 6.5 K/UL (ref 3.8–10.6)

## 2020-11-24 PROCEDURE — 82306 VITAMIN D 25 HYDROXY: CPT

## 2020-11-24 PROCEDURE — 83550 IRON BINDING TEST: CPT

## 2020-11-24 PROCEDURE — 83970 ASSAY OF PARATHORMONE: CPT

## 2020-11-24 PROCEDURE — 36415 COLL VENOUS BLD VENIPUNCTURE: CPT

## 2020-11-24 PROCEDURE — 82040 ASSAY OF SERUM ALBUMIN: CPT

## 2020-11-24 PROCEDURE — 81003 URINALYSIS AUTO W/O SCOPE: CPT

## 2020-11-24 PROCEDURE — 84550 ASSAY OF BLOOD/URIC ACID: CPT

## 2020-11-24 PROCEDURE — 85025 COMPLETE CBC W/AUTO DIFF WBC: CPT

## 2020-11-24 PROCEDURE — 83735 ASSAY OF MAGNESIUM: CPT

## 2020-11-24 PROCEDURE — 82570 ASSAY OF URINE CREATININE: CPT

## 2020-11-24 PROCEDURE — 82728 ASSAY OF FERRITIN: CPT

## 2020-11-24 PROCEDURE — 80048 BASIC METABOLIC PNL TOTAL CA: CPT

## 2020-11-24 PROCEDURE — 84100 ASSAY OF PHOSPHORUS: CPT

## 2020-11-24 PROCEDURE — 84156 ASSAY OF PROTEIN URINE: CPT

## 2020-11-24 PROCEDURE — 83540 ASSAY OF IRON: CPT

## 2020-12-31 ENCOUNTER — HOSPITAL ENCOUNTER (OUTPATIENT)
Dept: HOSPITAL 47 - LABWHC1 | Age: 75
Discharge: HOME | End: 2020-12-31
Attending: NURSE PRACTITIONER
Payer: MEDICARE

## 2020-12-31 DIAGNOSIS — M10.9: ICD-10-CM

## 2020-12-31 DIAGNOSIS — E11.22: ICD-10-CM

## 2020-12-31 DIAGNOSIS — R80.9: ICD-10-CM

## 2020-12-31 DIAGNOSIS — D63.1: ICD-10-CM

## 2020-12-31 DIAGNOSIS — N39.0: ICD-10-CM

## 2020-12-31 DIAGNOSIS — E55.9: ICD-10-CM

## 2020-12-31 DIAGNOSIS — N18.4: Primary | ICD-10-CM

## 2020-12-31 DIAGNOSIS — N25.81: ICD-10-CM

## 2020-12-31 LAB
ALBUMIN SERPL-MCNC: 4.6 G/DL (ref 3.8–4.9)
ANION GAP SERPL CALC-SCNC: 13.1 MMOL/L (ref 4–12)
BASOPHILS # BLD AUTO: 0 K/UL (ref 0–0.2)
BASOPHILS NFR BLD AUTO: 0 %
BUN SERPL-SCNC: 58 MG/DL (ref 9–27)
BUN/CREAT SERPL: 18.13 RATIO (ref 12–20)
CALCIUM SPEC-MCNC: 8.9 MG/DL (ref 8.7–10.3)
CHLORIDE SERPL-SCNC: 102 MMOL/L (ref 96–109)
CHOLEST SERPL-MCNC: 123 MG/DL (ref 0–200)
CO2 SERPL-SCNC: 26.9 MMOL/L (ref 21.6–31.8)
EOSINOPHIL # BLD AUTO: 0.3 K/UL (ref 0–0.7)
EOSINOPHIL NFR BLD AUTO: 3 %
ERYTHROCYTE [DISTWIDTH] IN BLOOD BY AUTOMATED COUNT: 3.03 M/UL (ref 4.3–5.9)
ERYTHROCYTE [DISTWIDTH] IN BLOOD: 13.4 % (ref 11.5–15.5)
FERRITIN SERPL-MCNC: 155.9 NG/ML (ref 22–322)
GLUCOSE SERPL-MCNC: 225 MG/DL (ref 70–110)
HBA1C MFR BLD: 7.4 % (ref 4–6)
HCT VFR BLD AUTO: 31.3 % (ref 39–53)
HDLC SERPL-MCNC: 43 MG/DL (ref 40–60)
HGB BLD-MCNC: 10.2 GM/DL (ref 13–17.5)
IRON SERPL-MCNC: 54 UG/DL (ref 65–175)
LDLC SERPL CALC-MCNC: 62.6 MG/DL (ref 0–131)
LYMPHOCYTES # SPEC AUTO: 0.7 K/UL (ref 1–4.8)
LYMPHOCYTES NFR SPEC AUTO: 10 %
MAGNESIUM SPEC-SCNC: 2.3 MG/DL (ref 1.5–2.4)
MCH RBC QN AUTO: 33.5 PG (ref 25–35)
MCHC RBC AUTO-ENTMCNC: 32.5 G/DL (ref 31–37)
MCV RBC AUTO: 103.2 FL (ref 80–100)
MONOCYTES # BLD AUTO: 0.4 K/UL (ref 0–1)
MONOCYTES NFR BLD AUTO: 5 %
NEUTROPHILS # BLD AUTO: 5.8 K/UL (ref 1.3–7.7)
NEUTROPHILS NFR BLD AUTO: 80 %
PH UR: 5.5 [PH] (ref 5–8)
PLATELET # BLD AUTO: 174 K/UL (ref 150–450)
POTASSIUM SERPL-SCNC: 4.1 MMOL/L (ref 3.5–5.5)
SODIUM SERPL-SCNC: 142 MMOL/L (ref 135–145)
SP GR UR: 1.01 (ref 1–1.03)
TIBC SERPL-MCNC: 318 UG/DL (ref 228–460)
TRIGL SERPL-MCNC: 87 MG/DL (ref 0–149)
URATE SERPL-MCNC: 7 MG/DL (ref 3.7–8.7)
UROBILINOGEN UR QL STRIP: <2 MG/DL (ref ?–2)
VLDLC SERPL CALC-MCNC: 17.4 MG/DL (ref 5–40)
WBC # BLD AUTO: 7.2 K/UL (ref 3.8–10.6)

## 2020-12-31 PROCEDURE — 84550 ASSAY OF BLOOD/URIC ACID: CPT

## 2020-12-31 PROCEDURE — 80061 LIPID PANEL: CPT

## 2020-12-31 PROCEDURE — 85025 COMPLETE CBC W/AUTO DIFF WBC: CPT

## 2020-12-31 PROCEDURE — 82570 ASSAY OF URINE CREATININE: CPT

## 2020-12-31 PROCEDURE — 83970 ASSAY OF PARATHORMONE: CPT

## 2020-12-31 PROCEDURE — 83540 ASSAY OF IRON: CPT

## 2020-12-31 PROCEDURE — 81003 URINALYSIS AUTO W/O SCOPE: CPT

## 2020-12-31 PROCEDURE — 83550 IRON BINDING TEST: CPT

## 2020-12-31 PROCEDURE — 84100 ASSAY OF PHOSPHORUS: CPT

## 2020-12-31 PROCEDURE — 82728 ASSAY OF FERRITIN: CPT

## 2020-12-31 PROCEDURE — 83735 ASSAY OF MAGNESIUM: CPT

## 2020-12-31 PROCEDURE — 84156 ASSAY OF PROTEIN URINE: CPT

## 2020-12-31 PROCEDURE — 82040 ASSAY OF SERUM ALBUMIN: CPT

## 2020-12-31 PROCEDURE — 82306 VITAMIN D 25 HYDROXY: CPT

## 2020-12-31 PROCEDURE — 80048 BASIC METABOLIC PNL TOTAL CA: CPT

## 2020-12-31 PROCEDURE — 83036 HEMOGLOBIN GLYCOSYLATED A1C: CPT

## 2020-12-31 PROCEDURE — 36415 COLL VENOUS BLD VENIPUNCTURE: CPT

## 2021-06-01 ENCOUNTER — HOSPITAL ENCOUNTER (OUTPATIENT)
Dept: HOSPITAL 47 - LABWHC1 | Age: 76
Discharge: HOME | End: 2021-06-01
Attending: INTERNAL MEDICINE
Payer: MEDICARE

## 2021-06-01 DIAGNOSIS — I25.10: ICD-10-CM

## 2021-06-01 DIAGNOSIS — M10.9: ICD-10-CM

## 2021-06-01 DIAGNOSIS — N18.4: Primary | ICD-10-CM

## 2021-06-01 DIAGNOSIS — N39.0: ICD-10-CM

## 2021-06-01 DIAGNOSIS — E55.9: ICD-10-CM

## 2021-06-01 DIAGNOSIS — E78.5: ICD-10-CM

## 2021-06-01 DIAGNOSIS — N25.81: ICD-10-CM

## 2021-06-01 DIAGNOSIS — E61.1: ICD-10-CM

## 2021-06-01 DIAGNOSIS — R80.9: ICD-10-CM

## 2021-06-01 DIAGNOSIS — D64.9: ICD-10-CM

## 2021-06-01 LAB
ALBUMIN SERPL-MCNC: 4.4 G/DL (ref 3.8–4.9)
ALBUMIN/GLOB SERPL: 1.63 G/DL (ref 1.6–3.17)
ALP SERPL-CCNC: 71 U/L (ref 41–126)
ALT SERPL-CCNC: 16 U/L (ref 10–49)
ANION GAP SERPL CALC-SCNC: 10.9 MMOL/L (ref 4–12)
AST SERPL-CCNC: 20 U/L (ref 14–35)
BASOPHILS # BLD AUTO: 0.02 X 10*3/UL (ref 0–0.1)
BASOPHILS NFR BLD AUTO: 0.3 %
BUN SERPL-SCNC: 54 MG/DL (ref 9–27)
BUN/CREAT SERPL: 18 RATIO (ref 12–20)
CALCIUM SPEC-MCNC: 8.8 MG/DL (ref 8.7–10.3)
CHLORIDE SERPL-SCNC: 105 MMOL/L (ref 96–109)
CHOLEST SERPL-MCNC: 123 MG/DL (ref 0–200)
CO2 SERPL-SCNC: 26.1 MMOL/L (ref 21.6–31.8)
EOSINOPHIL # BLD AUTO: 0.21 X 10*3/UL (ref 0.04–0.35)
EOSINOPHIL NFR BLD AUTO: 2.9 %
ERYTHROCYTE [DISTWIDTH] IN BLOOD BY AUTOMATED COUNT: 3.23 X 10*6/UL (ref 4.4–5.6)
ERYTHROCYTE [DISTWIDTH] IN BLOOD: 13.5 % (ref 11.5–14.5)
FERRITIN SERPL-MCNC: 104.9 NG/ML (ref 22–322)
GLOBULIN SER CALC-MCNC: 2.7 G/DL (ref 1.6–3.3)
GLUCOSE SERPL-MCNC: 157 MG/DL (ref 70–110)
HCT VFR BLD AUTO: 34.3 % (ref 39.6–50)
HDLC SERPL-MCNC: 46 MG/DL (ref 40–60)
HGB BLD-MCNC: 10.8 G/DL (ref 13–17)
IRON SERPL-MCNC: 76 UG/DL (ref 65–175)
LDLC SERPL CALC-MCNC: 61.4 MG/DL (ref 0–131)
LYMPHOCYTES # SPEC AUTO: 0.97 X 10*3/UL (ref 0.9–5)
LYMPHOCYTES NFR SPEC AUTO: 13.3 %
MAGNESIUM SPEC-SCNC: 2.3 MG/DL (ref 1.5–2.4)
MCH RBC QN AUTO: 33.4 PG (ref 27–32)
MCHC RBC AUTO-ENTMCNC: 31.5 G/DL (ref 32–37)
MCV RBC AUTO: 106.2 FL (ref 80–97)
MONOCYTES # BLD AUTO: 0.6 X 10*3/UL (ref 0.2–1)
MONOCYTES NFR BLD AUTO: 8.3 %
NEUTROPHILS # BLD AUTO: 5.45 X 10*3/UL (ref 1.8–7.7)
NEUTROPHILS NFR BLD AUTO: 74.9 %
PH UR: 5.5 [PH] (ref 5–8)
PLATELET # BLD AUTO: 144 X 10*3/UL (ref 140–440)
POTASSIUM SERPL-SCNC: 4.6 MMOL/L (ref 3.5–5.5)
PROT SERPL-MCNC: 7.1 G/DL (ref 6.2–8.2)
PROT UR QL: (no result)
PROT/CREAT UR-RTO: 0.71
RBC UR QL: 3 /HPF (ref 0–5)
SODIUM SERPL-SCNC: 142 MMOL/L (ref 135–145)
SP GR UR: 1.01 (ref 1–1.03)
TIBC SERPL-MCNC: 336 UG/DL (ref 228–460)
TRIGL SERPL-MCNC: 78 MG/DL (ref 0–149)
URATE SERPL-MCNC: 6.3 MG/DL (ref 3.7–8.7)
UROBILINOGEN UR QL STRIP: <2 MG/DL (ref ?–2)
VLDLC SERPL CALC-MCNC: 15.6 MG/DL (ref 5–40)
WBC # BLD AUTO: 7.27 X 10*3/UL (ref 4.5–10)
WBC #/AREA URNS HPF: <1 /HPF (ref 0–5)

## 2021-06-01 PROCEDURE — 83735 ASSAY OF MAGNESIUM: CPT

## 2021-06-01 PROCEDURE — 83540 ASSAY OF IRON: CPT

## 2021-06-01 PROCEDURE — 80076 HEPATIC FUNCTION PANEL: CPT

## 2021-06-01 PROCEDURE — 85025 COMPLETE CBC W/AUTO DIFF WBC: CPT

## 2021-06-01 PROCEDURE — 84550 ASSAY OF BLOOD/URIC ACID: CPT

## 2021-06-01 PROCEDURE — 84156 ASSAY OF PROTEIN URINE: CPT

## 2021-06-01 PROCEDURE — 84100 ASSAY OF PHOSPHORUS: CPT

## 2021-06-01 PROCEDURE — 80061 LIPID PANEL: CPT

## 2021-06-01 PROCEDURE — 82570 ASSAY OF URINE CREATININE: CPT

## 2021-06-01 PROCEDURE — 82728 ASSAY OF FERRITIN: CPT

## 2021-06-01 PROCEDURE — 83970 ASSAY OF PARATHORMONE: CPT

## 2021-06-01 PROCEDURE — 83550 IRON BINDING TEST: CPT

## 2021-06-01 PROCEDURE — 80048 BASIC METABOLIC PNL TOTAL CA: CPT

## 2021-06-01 PROCEDURE — 82306 VITAMIN D 25 HYDROXY: CPT

## 2021-06-01 PROCEDURE — 36415 COLL VENOUS BLD VENIPUNCTURE: CPT

## 2021-06-01 PROCEDURE — 81001 URINALYSIS AUTO W/SCOPE: CPT

## 2021-06-10 ENCOUNTER — HOSPITAL ENCOUNTER (INPATIENT)
Dept: HOSPITAL 47 - EC | Age: 76
LOS: 6 days | Discharge: NURSING, REHAB OR LONG-TERM CARE | DRG: 281 | End: 2021-06-16
Attending: FAMILY MEDICINE | Admitting: FAMILY MEDICINE
Payer: MEDICARE

## 2021-06-10 VITALS — BODY MASS INDEX: 20.7 KG/M2

## 2021-06-10 DIAGNOSIS — E86.0: ICD-10-CM

## 2021-06-10 DIAGNOSIS — I08.1: ICD-10-CM

## 2021-06-10 DIAGNOSIS — Z91.013: ICD-10-CM

## 2021-06-10 DIAGNOSIS — D63.1: ICD-10-CM

## 2021-06-10 DIAGNOSIS — D69.6: ICD-10-CM

## 2021-06-10 DIAGNOSIS — Z85.46: ICD-10-CM

## 2021-06-10 DIAGNOSIS — E78.5: ICD-10-CM

## 2021-06-10 DIAGNOSIS — E87.6: ICD-10-CM

## 2021-06-10 DIAGNOSIS — Z82.49: ICD-10-CM

## 2021-06-10 DIAGNOSIS — I50.22: ICD-10-CM

## 2021-06-10 DIAGNOSIS — Z88.2: ICD-10-CM

## 2021-06-10 DIAGNOSIS — Z79.4: ICD-10-CM

## 2021-06-10 DIAGNOSIS — I25.5: ICD-10-CM

## 2021-06-10 DIAGNOSIS — E86.1: ICD-10-CM

## 2021-06-10 DIAGNOSIS — Z79.82: ICD-10-CM

## 2021-06-10 DIAGNOSIS — I13.0: ICD-10-CM

## 2021-06-10 DIAGNOSIS — Z79.02: ICD-10-CM

## 2021-06-10 DIAGNOSIS — I21.4: Primary | ICD-10-CM

## 2021-06-10 DIAGNOSIS — N17.9: ICD-10-CM

## 2021-06-10 DIAGNOSIS — Z98.890: ICD-10-CM

## 2021-06-10 DIAGNOSIS — I27.20: ICD-10-CM

## 2021-06-10 DIAGNOSIS — Z20.822: ICD-10-CM

## 2021-06-10 DIAGNOSIS — Z98.41: ICD-10-CM

## 2021-06-10 DIAGNOSIS — E11.22: ICD-10-CM

## 2021-06-10 DIAGNOSIS — Z98.42: ICD-10-CM

## 2021-06-10 DIAGNOSIS — N18.4: ICD-10-CM

## 2021-06-10 DIAGNOSIS — Z79.899: ICD-10-CM

## 2021-06-10 PROCEDURE — 96361 HYDRATE IV INFUSION ADD-ON: CPT

## 2021-06-10 PROCEDURE — 80053 COMPREHEN METABOLIC PANEL: CPT

## 2021-06-10 PROCEDURE — 36415 COLL VENOUS BLD VENIPUNCTURE: CPT

## 2021-06-10 PROCEDURE — 84484 ASSAY OF TROPONIN QUANT: CPT

## 2021-06-10 PROCEDURE — 94760 N-INVAS EAR/PLS OXIMETRY 1: CPT

## 2021-06-10 PROCEDURE — 83036 HEMOGLOBIN GLYCOSYLATED A1C: CPT

## 2021-06-10 PROCEDURE — 71045 X-RAY EXAM CHEST 1 VIEW: CPT

## 2021-06-10 PROCEDURE — 83550 IRON BINDING TEST: CPT

## 2021-06-10 PROCEDURE — 81001 URINALYSIS AUTO W/SCOPE: CPT

## 2021-06-10 PROCEDURE — 86140 C-REACTIVE PROTEIN: CPT

## 2021-06-10 PROCEDURE — 85025 COMPLETE CBC W/AUTO DIFF WBC: CPT

## 2021-06-10 PROCEDURE — 93005 ELECTROCARDIOGRAM TRACING: CPT

## 2021-06-10 PROCEDURE — 80048 BASIC METABOLIC PNL TOTAL CA: CPT

## 2021-06-10 PROCEDURE — 83605 ASSAY OF LACTIC ACID: CPT

## 2021-06-10 PROCEDURE — 85730 THROMBOPLASTIN TIME PARTIAL: CPT

## 2021-06-10 PROCEDURE — 99285 EMERGENCY DEPT VISIT HI MDM: CPT

## 2021-06-10 PROCEDURE — 87635 SARS-COV-2 COVID-19 AMP PRB: CPT

## 2021-06-10 PROCEDURE — 83735 ASSAY OF MAGNESIUM: CPT

## 2021-06-10 PROCEDURE — 82728 ASSAY OF FERRITIN: CPT

## 2021-06-10 PROCEDURE — 96360 HYDRATION IV INFUSION INIT: CPT

## 2021-06-10 PROCEDURE — 93306 TTE W/DOPPLER COMPLETE: CPT

## 2021-06-10 PROCEDURE — 83540 ASSAY OF IRON: CPT

## 2021-06-16 VITALS — DIASTOLIC BLOOD PRESSURE: 74 MMHG | HEART RATE: 68 BPM | SYSTOLIC BLOOD PRESSURE: 123 MMHG | TEMPERATURE: 98 F

## 2021-06-16 VITALS — RESPIRATION RATE: 16 BRPM

## 2021-06-26 ENCOUNTER — HOSPITAL ENCOUNTER (OUTPATIENT)
Dept: HOSPITAL 47 - EC | Age: 76
Setting detail: OBSERVATION
LOS: 5 days | Discharge: NURSING, REHAB OR LONG-TERM CARE | End: 2021-07-01
Attending: FAMILY MEDICINE | Admitting: FAMILY MEDICINE
Payer: MEDICARE

## 2021-06-26 VITALS — BODY MASS INDEX: 22.4 KG/M2

## 2021-06-26 DIAGNOSIS — Z98.42: ICD-10-CM

## 2021-06-26 DIAGNOSIS — N17.9: ICD-10-CM

## 2021-06-26 DIAGNOSIS — R91.8: ICD-10-CM

## 2021-06-26 DIAGNOSIS — E87.6: ICD-10-CM

## 2021-06-26 DIAGNOSIS — I27.20: ICD-10-CM

## 2021-06-26 DIAGNOSIS — E11.22: ICD-10-CM

## 2021-06-26 DIAGNOSIS — Z90.79: ICD-10-CM

## 2021-06-26 DIAGNOSIS — I25.10: ICD-10-CM

## 2021-06-26 DIAGNOSIS — E43: ICD-10-CM

## 2021-06-26 DIAGNOSIS — F03.90: ICD-10-CM

## 2021-06-26 DIAGNOSIS — Z98.890: ICD-10-CM

## 2021-06-26 DIAGNOSIS — Z79.02: ICD-10-CM

## 2021-06-26 DIAGNOSIS — F32.9: ICD-10-CM

## 2021-06-26 DIAGNOSIS — Z91.018: ICD-10-CM

## 2021-06-26 DIAGNOSIS — Z79.899: ICD-10-CM

## 2021-06-26 DIAGNOSIS — D63.1: ICD-10-CM

## 2021-06-26 DIAGNOSIS — I25.2: ICD-10-CM

## 2021-06-26 DIAGNOSIS — D53.9: ICD-10-CM

## 2021-06-26 DIAGNOSIS — K57.30: ICD-10-CM

## 2021-06-26 DIAGNOSIS — Z98.41: ICD-10-CM

## 2021-06-26 DIAGNOSIS — E11.65: ICD-10-CM

## 2021-06-26 DIAGNOSIS — R18.8: ICD-10-CM

## 2021-06-26 DIAGNOSIS — R62.7: ICD-10-CM

## 2021-06-26 DIAGNOSIS — I08.1: ICD-10-CM

## 2021-06-26 DIAGNOSIS — N18.4: ICD-10-CM

## 2021-06-26 DIAGNOSIS — E86.0: Primary | ICD-10-CM

## 2021-06-26 DIAGNOSIS — H40.9: ICD-10-CM

## 2021-06-26 DIAGNOSIS — N25.81: ICD-10-CM

## 2021-06-26 DIAGNOSIS — Z85.46: ICD-10-CM

## 2021-06-26 DIAGNOSIS — R19.5: ICD-10-CM

## 2021-06-26 DIAGNOSIS — Z79.4: ICD-10-CM

## 2021-06-26 DIAGNOSIS — D69.6: ICD-10-CM

## 2021-06-26 DIAGNOSIS — Z79.82: ICD-10-CM

## 2021-06-26 DIAGNOSIS — E78.5: ICD-10-CM

## 2021-06-26 DIAGNOSIS — Z88.2: ICD-10-CM

## 2021-06-26 DIAGNOSIS — I13.0: ICD-10-CM

## 2021-06-26 DIAGNOSIS — Z82.49: ICD-10-CM

## 2021-06-26 DIAGNOSIS — I25.5: ICD-10-CM

## 2021-06-26 DIAGNOSIS — R79.89: ICD-10-CM

## 2021-06-26 DIAGNOSIS — I50.23: ICD-10-CM

## 2021-06-26 LAB
ANION GAP SERPL CALC-SCNC: 7 MMOL/L
APTT BLD: 24 SEC (ref 22–30)
BASOPHILS # BLD AUTO: 0 K/UL (ref 0–0.2)
BASOPHILS NFR BLD AUTO: 0 %
BUN SERPL-SCNC: 54 MG/DL (ref 9–20)
CALCIUM SPEC-MCNC: 7.9 MG/DL (ref 8.4–10.2)
CHLORIDE SERPL-SCNC: 117 MMOL/L (ref 98–107)
CO2 SERPL-SCNC: 19 MMOL/L (ref 22–30)
EOSINOPHIL # BLD AUTO: 0.1 K/UL (ref 0–0.7)
EOSINOPHIL NFR BLD AUTO: 1 %
ERYTHROCYTE [DISTWIDTH] IN BLOOD BY AUTOMATED COUNT: 2.98 M/UL (ref 4.3–5.9)
ERYTHROCYTE [DISTWIDTH] IN BLOOD: 16.1 % (ref 11.5–15.5)
GLUCOSE SERPL-MCNC: 148 MG/DL (ref 74–99)
HCT VFR BLD AUTO: 31.4 % (ref 39–53)
HGB BLD-MCNC: 9.6 GM/DL (ref 13–17.5)
INR PPP: 1.2 (ref ?–1.2)
LYMPHOCYTES # SPEC AUTO: 0.6 K/UL (ref 1–4.8)
LYMPHOCYTES NFR SPEC AUTO: 7 %
MAGNESIUM SPEC-SCNC: 2.6 MG/DL (ref 1.6–2.3)
MCH RBC QN AUTO: 32.3 PG (ref 25–35)
MCHC RBC AUTO-ENTMCNC: 30.6 G/DL (ref 31–37)
MCV RBC AUTO: 105.6 FL (ref 80–100)
MONOCYTES # BLD AUTO: 0.3 K/UL (ref 0–1)
MONOCYTES NFR BLD AUTO: 4 %
NEUTROPHILS # BLD AUTO: 7.3 K/UL (ref 1.3–7.7)
NEUTROPHILS NFR BLD AUTO: 87 %
PLATELET # BLD AUTO: 110 K/UL (ref 150–450)
POTASSIUM SERPL-SCNC: 4.1 MMOL/L (ref 3.5–5.1)
PT BLD: 12.8 SEC (ref 9–12)
SODIUM SERPL-SCNC: 143 MMOL/L (ref 137–145)
WBC # BLD AUTO: 8.4 K/UL (ref 3.8–10.6)

## 2021-06-26 PROCEDURE — 97162 PT EVAL MOD COMPLEX 30 MIN: CPT

## 2021-06-26 PROCEDURE — 80053 COMPREHEN METABOLIC PANEL: CPT

## 2021-06-26 PROCEDURE — 83615 LACTATE (LD) (LDH) ENZYME: CPT

## 2021-06-26 PROCEDURE — 83010 ASSAY OF HAPTOGLOBIN QUANT: CPT

## 2021-06-26 PROCEDURE — 83883 ASSAY NEPHELOMETRY NOT SPEC: CPT

## 2021-06-26 PROCEDURE — 93005 ELECTROCARDIOGRAM TRACING: CPT

## 2021-06-26 PROCEDURE — 85027 COMPLETE CBC AUTOMATED: CPT

## 2021-06-26 PROCEDURE — 85025 COMPLETE CBC W/AUTO DIFF WBC: CPT

## 2021-06-26 PROCEDURE — 86850 RBC ANTIBODY SCREEN: CPT

## 2021-06-26 PROCEDURE — 99285 EMERGENCY DEPT VISIT HI MDM: CPT

## 2021-06-26 PROCEDURE — 82272 OCCULT BLD FECES 1-3 TESTS: CPT

## 2021-06-26 PROCEDURE — 83605 ASSAY OF LACTIC ACID: CPT

## 2021-06-26 PROCEDURE — 87635 SARS-COV-2 COVID-19 AMP PRB: CPT

## 2021-06-26 PROCEDURE — 83540 ASSAY OF IRON: CPT

## 2021-06-26 PROCEDURE — 96374 THER/PROPH/DIAG INJ IV PUSH: CPT

## 2021-06-26 PROCEDURE — 84484 ASSAY OF TROPONIN QUANT: CPT

## 2021-06-26 PROCEDURE — 96376 TX/PRO/DX INJ SAME DRUG ADON: CPT

## 2021-06-26 PROCEDURE — 82746 ASSAY OF FOLIC ACID SERUM: CPT

## 2021-06-26 PROCEDURE — 85610 PROTHROMBIN TIME: CPT

## 2021-06-26 PROCEDURE — 96372 THER/PROPH/DIAG INJ SC/IM: CPT

## 2021-06-26 PROCEDURE — 96375 TX/PRO/DX INJ NEW DRUG ADDON: CPT

## 2021-06-26 PROCEDURE — 82607 VITAMIN B-12: CPT

## 2021-06-26 PROCEDURE — 83735 ASSAY OF MAGNESIUM: CPT

## 2021-06-26 PROCEDURE — 82728 ASSAY OF FERRITIN: CPT

## 2021-06-26 PROCEDURE — 97530 THERAPEUTIC ACTIVITIES: CPT

## 2021-06-26 PROCEDURE — 86901 BLOOD TYPING SEROLOGIC RH(D): CPT

## 2021-06-26 PROCEDURE — 80048 BASIC METABOLIC PNL TOTAL CA: CPT

## 2021-06-26 PROCEDURE — 85730 THROMBOPLASTIN TIME PARTIAL: CPT

## 2021-06-26 PROCEDURE — 74176 CT ABD & PELVIS W/O CONTRAST: CPT

## 2021-06-26 PROCEDURE — 97166 OT EVAL MOD COMPLEX 45 MIN: CPT

## 2021-06-26 PROCEDURE — 83550 IRON BINDING TEST: CPT

## 2021-06-26 PROCEDURE — 85045 AUTOMATED RETICULOCYTE COUNT: CPT

## 2021-06-26 PROCEDURE — 96361 HYDRATE IV INFUSION ADD-ON: CPT

## 2021-06-26 PROCEDURE — 36415 COLL VENOUS BLD VENIPUNCTURE: CPT

## 2021-06-26 PROCEDURE — 86900 BLOOD TYPING SEROLOGIC ABO: CPT

## 2021-06-26 PROCEDURE — 71045 X-RAY EXAM CHEST 1 VIEW: CPT

## 2021-06-26 NOTE — ED
General Adult HPI





- General


Stated complaint: Lower GI problems


Time Seen by Provider: 06/26/21 18:55





- History of Present Illness


Initial comments: 


Dictation was produced using dragon dictation software. please excuse any 

grammatical, word or spelling errors. 











Chief Complaint: 75-year-old male sent in from local nursing home for black 

tarry stools, failure to thrive





History of Present Illness: Patient is 75-year-old male is currently a resident 

of one of our local nursing homes.  Patient was sent in by EMS for failure to 

thrive and black tarry stools.  Patient has history of dementia, diabetes 

prostate disorder.  Patient states he does have some abdominal pain.  Patient is

a poor historian.








Unable to obtain review of systems secondary to mental status








PHYSICAL EXAM:


General Impression: Lethargic, pale, dry mucous membranes


HEENT: Normocephalic atraumatic, extra-ocular movements intact, pupils equal and

reactive to light bilaterally


Cardiovascular: Heart regular rate and rhythm


Chest: Able to complete full sentences, no retractions, no tachypnea


Abdomen: abdomen soft, non-tender, non-distended, no organomegaly


Musculoskeletal: Pulses present and equal in all extremities, no peripheral 

edema


Motor:  no focal deficits noted


Rectal: Melanotic stool with digital rectal exam, no fissures or


Neurological: CN II-XII grossly intact, no focal motor or sensory deficits noted


Skin: Intact with no visualized rashes


Psych: Normal affect and mood





ED course: 75-year-old Male presents with a recurrent GI bleed.  Transferred 

neck medications reviewed.  It does not appear the patient is prescribed any 

anticoagulation medications.  He is however on antiplatelet medications.


Laboratory evaluation obtained.  CBC within acceptable limits.  Metabolic panel 

within acceptable limits.  Computed tomography scan of the abdomen and pelvis 

without contrast was obtained showing cardiac regular pleural effusions.  This 

could relate to CHF.  Patient has history of heart failure.  Patient be admitted

for IV hydration.  Patient has very tenuous heart function.  Patient will need 

careful hydration.  Case was discussed with Dr. Patricia is willing to accept 

patients care.





EKG interpretation: Ventricular rate 73, normal sinus rhythm, AK interval 202, 

QRS 70, QTc 513. No AK prolongation, , no ST or T-wave changes noted.  EKG 

compared to 06/10/2021 showing no changes.  Overall, this EKG is unremarkable





- Related Data


                                Home Medications











 Medication  Instructions  Recorded  Confirmed


 


Aspirin 81 mg PO DAILY@0900 06/10/21 06/26/21


 


Brimonidine Tartrate [Alphagan P 1 drop BOTH EYES BID@0900,2100 06/10/21 

06/26/21





0.2% Ophth Soln]   


 


Ferrous Gluconate 324 mg PO BID@0900,2100 06/10/21 06/26/21


 


Isosorbide Mononitrate ER [Imdur] 15 mg PO DAILY@0900 06/10/21 06/26/21


 


Nephrovite 1 cap PO HS@2100 06/10/21 06/26/21


 


Timolol 0.5% Ophth Soln [Timoptic 1 drop BOTH EYES BID@0900,2100 06/10/21 

06/26/21





0.5% Ophth Soln]   


 


allopurinoL [Zyloprim] 100 mg PO HS@2100 06/10/21 06/26/21


 


calcitrioL [Rocaltrol] 0.25 mcg PO TUFR 06/10/21 06/26/21


 


hydrALAZINE HCL [Apresoline] 10 mg PO BID@0900,2100 06/10/21 06/26/21


 


ALPRAZolam [Xanax] 0.25 mg PO BID PRN 06/26/21 06/26/21


 


Clopidogrel [Plavix] 75 mg PO DAILY@0900 06/26/21 06/26/21


 


INSULIN LISPRO (HumaLOG) [humaLOG] See Protocol SQ ACHS@08,12,17,21 06/26/21 06/26/21


 


Insulin Glargine,Hum.rec.anlog 15 unit SQ HS@2100 06/26/21 06/26/21





[Semglee Pen]   


 


Lactose-Reduced Food [Ensure Plus] 240 ml PO BID@0900,1700 06/26/21 06/26/21


 


Nitroglycerin Sl Tabs [Nitrostat] 0.4 mg PO Q5M PRN 06/26/21 06/26/21


 


Tamsulosin [Flomax] 0.4 mg PO DAILY@0900 06/26/21 06/26/21








                                  Previous Rx's











 Medication  Instructions  Recorded


 


Acetaminophen Tab [Tylenol] 650 mg PO Q6HR PRN  tab 06/14/21


 


Atorvastatin [Lipitor] 80 mg PO HS@2100  tab 06/14/21


 


carvediloL [Coreg*] 12.5 mg PO BID@0630,1800  tab 06/14/21











                                    Allergies











Allergy/AdvReac Type Severity Reaction Status Date / Time


 


Fish Containing Products Allergy  Swelling Verified 06/10/21 21:43





[Fish]     


 


Sulfa (Sulfonamide Allergy  Unknown Verified 06/10/21 21:43





Antibiotics)   Childhood  














Review of Systems


ROS Statement: 


Those systems with pertinent positive or pertinent negative responses have been 

documented in the HPI.





ROS Other: All systems not noted in ROS Statement are negative.





Past Medical History


Past Medical History: Cancer, Diabetes Mellitus, Hyperlipidemia, Hypertension, 

Prostate Disorder, Renal Disease


Additional Past Medical History / Comment(s): Prostate cancer with surgery in 

2008, Patient states he has issues with his kidney's and has been seeing doctor 

Devendra since December 2018.


History of Any Multi-Drug Resistant Organisms: None Reported


Past Surgical History: Orthopedic Surgery, Prostate Surgery


Additional Past Surgical History / Comment(s): RIGHT ANKLE 2010, Bilateral 

Cataract 2017, Colonoscopy December 2018, Prostate surgery 2008


Past Anesthesia/Blood Transfusion Reactions: No Reported Reaction


Past Psychological History: No Psychological Hx Reported


Smoking Status: Never smoker


Past Alcohol Use History: Rare


Past Drug Use History: None Reported





- Past Family History


  ** Mother


Family Medical History: No Reported History


Additional Family Medical History / Comment(s): CHF





  ** Father


Family Medical History: No Reported History


Additional Family Medical History / Comment(s): Patient states father passed 

away from a massive heart attack.





Course


                                   Vital Signs











  06/26/21 06/26/21





  18:53 19:16


 


Temperature 98.7 F 


 


Pulse Rate 73 73


 


Respiratory 20 18





Rate  


 


Blood Pressure 122/88 137/82


 


O2 Sat by Pulse 97 96





Oximetry  














Medical Decision Making





- Lab Data


Result diagrams: 


                                 06/26/21 19:04





                                 06/26/21 19:04


                                   Lab Results











  06/26/21 06/26/21 06/26/21 Range/Units





  19:04 19:04 19:04 


 


WBC  8.4    (3.8-10.6)  k/uL


 


RBC  2.98 L    (4.30-5.90)  m/uL


 


Hgb  9.6 L    (13.0-17.5)  gm/dL


 


Hct  31.4 L    (39.0-53.0)  %


 


MCV  105.6 H    (80.0-100.0)  fL


 


MCH  32.3    (25.0-35.0)  pg


 


MCHC  30.6 L    (31.0-37.0)  g/dL


 


RDW  16.1 H    (11.5-15.5)  %


 


Plt Count  110 L    (150-450)  k/uL


 


MPV  9.5    


 


Neutrophils %  87    %


 


Lymphocytes %  7    %


 


Monocytes %  4    %


 


Eosinophils %  1    %


 


Basophils %  0    %


 


Neutrophils #  7.3    (1.3-7.7)  k/uL


 


Lymphocytes #  0.6 L    (1.0-4.8)  k/uL


 


Monocytes #  0.3    (0-1.0)  k/uL


 


Eosinophils #  0.1    (0-0.7)  k/uL


 


Basophils #  0.0    (0-0.2)  k/uL


 


Hypochromasia  Moderate    


 


Poikilocytosis  Slight    


 


Anisocytosis  Slight    


 


Macrocytosis  Moderate    


 


PT   12.8 H   (9.0-12.0)  sec


 


INR   1.2 H   (<1.2)  


 


APTT   24.0   (22.0-30.0)  sec


 


Sodium    143  (137-145)  mmol/L


 


Potassium    4.1  (3.5-5.1)  mmol/L


 


Chloride    117 H  ()  mmol/L


 


Carbon Dioxide    19 L  (22-30)  mmol/L


 


Anion Gap    7  mmol/L


 


BUN    54 H  (9-20)  mg/dL


 


Creatinine    2.13 H  (0.66-1.25)  mg/dL


 


Est GFR (CKD-EPI)AfAm    35  (>60 ml/min/1.73 sqM)  


 


Est GFR (CKD-EPI)NonAf    30  (>60 ml/min/1.73 sqM)  


 


Glucose    148 H  (74-99)  mg/dL


 


Plasma Lactic Acid Zane     (0.7-2.0)  mmol/L


 


Calcium    7.9 L  (8.4-10.2)  mg/dL


 


Magnesium    2.6 H  (1.6-2.3)  mg/dL


 


Stool Occult Blood     (Negative)  


 


Coronavirus (PCR)     (Not Detectd)  


 


Blood Type     


 


Blood Type Confirm     


 


Blood Type Recheck     


 


Bld Type Recheck Status     


 


Antibody Screen     


 


Spec Expiration Date     














  06/26/21 06/26/21 06/26/21 Range/Units





  19:04 19:04 19:04 


 


WBC     (3.8-10.6)  k/uL


 


RBC     (4.30-5.90)  m/uL


 


Hgb     (13.0-17.5)  gm/dL


 


Hct     (39.0-53.0)  %


 


MCV     (80.0-100.0)  fL


 


MCH     (25.0-35.0)  pg


 


MCHC     (31.0-37.0)  g/dL


 


RDW     (11.5-15.5)  %


 


Plt Count     (150-450)  k/uL


 


MPV     


 


Neutrophils %     %


 


Lymphocytes %     %


 


Monocytes %     %


 


Eosinophils %     %


 


Basophils %     %


 


Neutrophils #     (1.3-7.7)  k/uL


 


Lymphocytes #     (1.0-4.8)  k/uL


 


Monocytes #     (0-1.0)  k/uL


 


Eosinophils #     (0-0.7)  k/uL


 


Basophils #     (0-0.2)  k/uL


 


Hypochromasia     


 


Poikilocytosis     


 


Anisocytosis     


 


Macrocytosis     


 


PT     (9.0-12.0)  sec


 


INR     (<1.2)  


 


APTT     (22.0-30.0)  sec


 


Sodium     (137-145)  mmol/L


 


Potassium     (3.5-5.1)  mmol/L


 


Chloride     ()  mmol/L


 


Carbon Dioxide     (22-30)  mmol/L


 


Anion Gap     mmol/L


 


BUN     (9-20)  mg/dL


 


Creatinine     (0.66-1.25)  mg/dL


 


Est GFR (CKD-EPI)AfAm     (>60 ml/min/1.73 sqM)  


 


Est GFR (CKD-EPI)NonAf     (>60 ml/min/1.73 sqM)  


 


Glucose     (74-99)  mg/dL


 


Plasma Lactic Acid Zane  1.1    (0.7-2.0)  mmol/L


 


Calcium     (8.4-10.2)  mg/dL


 


Magnesium     (1.6-2.3)  mg/dL


 


Stool Occult Blood    Negative  (Negative)  


 


Coronavirus (PCR)     (Not Detectd)  


 


Blood Type   O Positive   


 


Blood Type Confirm     


 


Blood Type Recheck   No Previous Record   


 


Bld Type Recheck Status   CABO Indicated   


 


Antibody Screen   NEGATIVE   


 


Spec Expiration Date   06/29/2021 - 2304 06/26/21 06/26/21 Range/Units





  19:20 19:50 


 


WBC    (3.8-10.6)  k/uL


 


RBC    (4.30-5.90)  m/uL


 


Hgb    (13.0-17.5)  gm/dL


 


Hct    (39.0-53.0)  %


 


MCV    (80.0-100.0)  fL


 


MCH    (25.0-35.0)  pg


 


MCHC    (31.0-37.0)  g/dL


 


RDW    (11.5-15.5)  %


 


Plt Count    (150-450)  k/uL


 


MPV    


 


Neutrophils %    %


 


Lymphocytes %    %


 


Monocytes %    %


 


Eosinophils %    %


 


Basophils %    %


 


Neutrophils #    (1.3-7.7)  k/uL


 


Lymphocytes #    (1.0-4.8)  k/uL


 


Monocytes #    (0-1.0)  k/uL


 


Eosinophils #    (0-0.7)  k/uL


 


Basophils #    (0-0.2)  k/uL


 


Hypochromasia    


 


Poikilocytosis    


 


Anisocytosis    


 


Macrocytosis    


 


PT    (9.0-12.0)  sec


 


INR    (<1.2)  


 


APTT    (22.0-30.0)  sec


 


Sodium    (137-145)  mmol/L


 


Potassium    (3.5-5.1)  mmol/L


 


Chloride    ()  mmol/L


 


Carbon Dioxide    (22-30)  mmol/L


 


Anion Gap    mmol/L


 


BUN    (9-20)  mg/dL


 


Creatinine    (0.66-1.25)  mg/dL


 


Est GFR (CKD-EPI)AfAm    (>60 ml/min/1.73 sqM)  


 


Est GFR (CKD-EPI)NonAf    (>60 ml/min/1.73 sqM)  


 


Glucose    (74-99)  mg/dL


 


Plasma Lactic Acid Zane    (0.7-2.0)  mmol/L


 


Calcium    (8.4-10.2)  mg/dL


 


Magnesium    (1.6-2.3)  mg/dL


 


Stool Occult Blood    (Negative)  


 


Coronavirus (PCR)   Not Detected  (Not Detectd)  


 


Blood Type    


 


Blood Type Confirm  O Positive   


 


Blood Type Recheck    


 


Bld Type Recheck Status    


 


Antibody Screen    


 


Spec Expiration Date    














Disposition


Clinical Impression: 


 Dehydration, Failure to thrive





Disposition: ADMITTED AS IP TO THIS HOSP


Condition: Fair

## 2021-06-26 NOTE — CT
EXAMINATION TYPE: CT abdomen pelvis wo con

 

DATE OF EXAM: 6/26/2021

 

COMPARISON: None

 

HISTORY: GI issues

 

CT DLP: 567.1 mGycm

Automated exposure control for dose reduction was used.

 

There are moderate bilateral pleural effusions. Heart is enlarged. There is no pericardial effusion. 
There is bilateral lower lobe pulmonary infiltrates and atelectasis.

 

Liver and spleen appear intact. The bile ducts are not dilated. There are calcified splenic granuloma
ta. Stomach is intact. There is no evidence of pancreatic mass.

 

Kidneys have normal size. There is no hydronephrosis. There is no adrenal mass. Ureters are not dilat
ed. Abdominal aorta is atheromatous. There is no retroperitoneal adenopathy. Bladder distends smoothl
y. There are surgical clips in the pelvis. There is no inguinal hernia.

 

There is some mild mesenteric fat stranding and fluid noted in the lower abdomen and right paracolic 
gutter. Exam limited by motion. There is mild abdominal ascites. There are numerous sigmoid diverticu
la. I see no diverticulitis. There is no evidence of free air.

 

The lumbar vertebra have normal alignment. There is no compression fracture. Posterior elements are i
ntact. The bony pelvis is intact. The hip joints are intact. There is no hip dysplasia. There is smal
l amount of air apparently in the right femoral vein that could be from catheterization. There is luis a
e subcutaneous edema around the abdomen.

 

IMPRESSION:

Cardiomegaly with pleural effusions and basilar infiltrates and atelectasis. This could relate to con
gestive heart failure.

 

Mild abdominal ascites fluid also could be secondary to heart failure.

 

Sigmoid diverticulosis without diverticulitis.

 

No bowel obstruction. No free air.

## 2021-06-27 LAB
ALBUMIN SERPL-MCNC: 2.5 G/DL (ref 3.5–5)
ALP SERPL-CCNC: 130 U/L (ref 38–126)
ALT SERPL-CCNC: 61 U/L (ref 4–49)
ANION GAP SERPL CALC-SCNC: 7 MMOL/L
AST SERPL-CCNC: 33 U/L (ref 17–59)
BUN SERPL-SCNC: 48 MG/DL (ref 9–20)
CALCIUM SPEC-MCNC: 7.7 MG/DL (ref 8.4–10.2)
CHLORIDE SERPL-SCNC: 117 MMOL/L (ref 98–107)
CO2 SERPL-SCNC: 19 MMOL/L (ref 22–30)
ERYTHROCYTE [DISTWIDTH] IN BLOOD BY AUTOMATED COUNT: 2.67 M/UL (ref 4.3–5.9)
ERYTHROCYTE [DISTWIDTH] IN BLOOD: 15.8 % (ref 11.5–15.5)
FERRITIN SERPL-MCNC: 209 NG/ML (ref 22–322)
GLUCOSE BLD-MCNC: 124 MG/DL (ref 75–99)
GLUCOSE BLD-MCNC: 138 MG/DL (ref 75–99)
GLUCOSE BLD-MCNC: 140 MG/DL (ref 75–99)
GLUCOSE BLD-MCNC: 257 MG/DL (ref 75–99)
GLUCOSE SERPL-MCNC: 127 MG/DL (ref 74–99)
HCT VFR BLD AUTO: 27.7 % (ref 39–53)
HGB BLD-MCNC: 9 GM/DL (ref 13–17.5)
IRON SERPL-MCNC: 21 UG/DL (ref 65–175)
LDH SPEC-CCNC: 686 U/L (ref 313–618)
MCH RBC QN AUTO: 33.7 PG (ref 25–35)
MCHC RBC AUTO-ENTMCNC: 32.6 G/DL (ref 31–37)
MCV RBC AUTO: 103.4 FL (ref 80–100)
PLATELET # BLD AUTO: 89 K/UL (ref 150–450)
POTASSIUM SERPL-SCNC: 4 MMOL/L (ref 3.5–5.1)
PROT SERPL-MCNC: 5.2 G/DL (ref 6.3–8.2)
SODIUM SERPL-SCNC: 143 MMOL/L (ref 137–145)
TIBC SERPL-MCNC: 197 UG/DL (ref 228–460)
VIT B12 SERPL-MCNC: 1228 PG/ML (ref 200–944)
WBC # BLD AUTO: 6.8 K/UL (ref 3.8–10.6)

## 2021-06-27 RX ADMIN — INSULIN ASPART SCH: 100 INJECTION, SOLUTION INTRAVENOUS; SUBCUTANEOUS at 09:54

## 2021-06-27 RX ADMIN — INSULIN ASPART SCH: 100 INJECTION, SOLUTION INTRAVENOUS; SUBCUTANEOUS at 12:31

## 2021-06-27 RX ADMIN — Medication SCH EACH: at 20:15

## 2021-06-27 RX ADMIN — INSULIN ASPART SCH: 100 INJECTION, SOLUTION INTRAVENOUS; SUBCUTANEOUS at 20:38

## 2021-06-27 RX ADMIN — ATORVASTATIN CALCIUM SCH MG: 80 TABLET, FILM COATED ORAL at 20:15

## 2021-06-27 RX ADMIN — TAMSULOSIN HYDROCHLORIDE SCH MG: 0.4 CAPSULE ORAL at 09:58

## 2021-06-27 RX ADMIN — FUROSEMIDE SCH MG: 10 INJECTION, SOLUTION INTRAMUSCULAR; INTRAVENOUS at 12:53

## 2021-06-27 RX ADMIN — HEPARIN SODIUM SCH UNIT: 5000 INJECTION INTRAVENOUS; SUBCUTANEOUS at 20:15

## 2021-06-27 RX ADMIN — CLOPIDOGREL BISULFATE SCH MG: 75 TABLET ORAL at 09:58

## 2021-06-27 RX ADMIN — TIMOLOL MALEATE SCH DROPS: 5 SOLUTION OPHTHALMIC at 20:16

## 2021-06-27 RX ADMIN — BRIMONIDINE TARTRATE SCH DROPS: 2 SOLUTION/ DROPS OPHTHALMIC at 20:16

## 2021-06-27 RX ADMIN — TIMOLOL MALEATE SCH DROPS: 5 SOLUTION OPHTHALMIC at 09:58

## 2021-06-27 RX ADMIN — INSULIN DETEMIR SCH UNIT: 100 INJECTION, SOLUTION SUBCUTANEOUS at 20:38

## 2021-06-27 RX ADMIN — INSULIN ASPART SCH: 100 INJECTION, SOLUTION INTRAVENOUS; SUBCUTANEOUS at 18:31

## 2021-06-27 RX ADMIN — ASPIRIN 81 MG CHEWABLE TABLET SCH MG: 81 TABLET CHEWABLE at 09:58

## 2021-06-27 RX ADMIN — BRIMONIDINE TARTRATE SCH DROPS: 2 SOLUTION/ DROPS OPHTHALMIC at 12:54

## 2021-06-27 RX ADMIN — ISOSORBIDE MONONITRATE SCH MG: 30 TABLET, EXTENDED RELEASE ORAL at 09:57

## 2021-06-27 NOTE — CONS
CONSULTATION



This is a gentleman who was transferred from a local nursing home with some black tarry

stools and inability to eat.  He also has history of dementia, diabetes, and recently

he was discharged from the hospital and he was found to have elevated troponin with

ejection fraction of less than 20% and Dr. Mercado saw the patient and after due

discussion, he was advised conservative management.  However, patient has been

transferred here from the nursing home and I was asked to see him because of the

elevated troponin.  On reviewing the data, it appears that the troponin numbers do not

suggest any new myocardial injury.  Initial troponin was 0.1 and it has remained about

the same.  During the last visit, troponin was up to 5.2.  He has also elevated BUN and

creatinine, is lethargic, does not communicate and does not give much history.



The patient's troponin elevation does not represent new myocardial injury.



Please refer to detailed notes dictated by previous consultants.



PAST MEDICAL HISTORY:

Remarkable for diabetes, hypertension, hyperlipidemia, history of some prostate cancer

as well.



MEDICATIONS:

Medications at home include Lipitor, carvedilol, hydralazine, Plavix, aspirin, insulin,

and Flomax.



EKG revealed sinus mechanism with minor nonspecific ST changes.



PHYSICAL EXAMINATION:

On examination, blood pressure is 130/70, pulse rate is 70 per minute.

HEENT unremarkable.  Fundus was not examined by me.

Neck is supple.  There is no JVD.  I do not hear a carotid bruit.

HEART exam reveals S1, S2 with a short systolic murmur.

LUNGS reveal diminished air entry.

ABDOMEN is soft.

Lower extremities reveal diminished pulses.  Central nervous system: A detailed exam

was not performed.



IMPRESSION:

1. Malnutrition with dementia.

2. History of recent echo which revealed ejection fraction of less than 20% with

    elevated troponin.

3. Current troponin levels do not represent a myocardial injury.



RECOMMENDATIONS:

No aggressive intervention from a cardiac standpoint.  Supportive care and patient can

be transported back to his facility.  No intervention necessary from a cardiac

standpoint.  We will see him as needed.





MMODL / IJN: [id number] / Job#: [id number]

## 2021-06-27 NOTE — P.HPIM
History of Present Illness


H&P Date: 21


Chief Complaint: Acute on chronic kidney disease stage IV/elevated troponin.





HISTORY OF PRESENT ILLNESS:





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 

significant for hypertension and hypertensive cardio vascular disease with left 

ventricular hypertrophy, history of ischemic cardiomyopathy ejection fraction of

20%, non-ST elevation myocardial infarction, moderate mitral regurgitation 

tricuspid regurgitation with severe pulmonary hypertension, diabetes mellitus 

type 2, chronic kidney disease stage IV, anemia, history of thrombocytopenia, 

patient was recently hospitalized at [facility] between  and 

2021 after he was admitted for non-ST elevation MI as well as acute 

systolic heart failure and the patient was elected to treat medically at that 

time, [place] [facility] [place], yesterday 

the nurse practitioner Jimena was seen the patient and he was spitting his food is

not eating or drinking he appeared graft, and his laboratory evaluation continue

to be abnormal, he was recommended for the patient be transferred to the 

emergency department at [facility] was found to have elevated troponin 

due to his non-ST elevation myocardial infarction, he was admitted to the [facility] with  c cardiologyonsultation and for further evaluation and treatment.





REVIEW OF SYSTEMS:





Constitutional: No documented fever, no chills, no night sweats.  minimal weight

change.  positive for  weakness, fatigue or lethargy.  No daytime sleepiness.


HEENT: No headache.  No blurred vision or double vision, no loss of vision.  No 

loss of Hearing, no ringing in the ears, no dizziness.  No nasal drainage or 

congestion.  No epistaxis.  No sore throat.


Lungs: positive for  shortness of breath, no cough, no sputum production.  No 

wheezing.  Reports dyspnea with activity.


Cardiovascular: No chest pain, no lower extremity edema.  No palpitations.  No 

paroxysmal nocturnal dyspnea.  No orthopnea.  No lightheadedness or dizziness.  

No syncopal episodes.


Abdominal: Reports abdominal pain.  No nausea, vomiting.  No diarrhea.  No 

constipation.  No bloody or tarry stools reports loss of appetite.


Genitourinary: No dysuria, increased frequency, urgency.  No urinary retention.


Musculoskeletal: No myalgias.  positive for muscle weakness, no gait 

dysfunction, no frequent falls.  No back pain.  No neck pain.


Integumentary: No wounds, no lesions.  No rash or pruritus.  No unusual 

bruising.  No change in hair or nails.


Neurologic: No aphasia. No facial droop. No change in mentation. No head injury.

No headache. No paralysis. No paresthesia.


Psychiatric: appears anxious with lapses in memory, not eating or drinking, 

spitting his food.


Endocrine: No abnormal blood sugars.  No weight change.  





PAST MEDICAL HISTORY:





 coronary artery disease with ischemic cardiomyopathy ejection fraction 20%


Hypertension and hypertensive cardiovascular disease.


Hyperlipidemia.


Diabetes mellitus type 2.


Chronic kidney disease stage IV.


Anemia of chronic kidney disease.


Thrombocytopenia.


Moderate mitral regurgitation.  


 severe tricuspid regurgitation.  


 severe pulmonary hypertension.


Memory loss.


Prostate cancer.











PAST SURGICAL HISTORY:





Prostate surgery 


Right ankle surgery 2010


Bilateral cataract surgery.


Colonoscopy.





SOCIAL HISTORY:





Patient is a lifelong nonsmoker, he denies any congestion, no drainage abuse, 

currently resides at [facility].





FAMILY HISTORY: 





Mother  from congestive heart failure at the age of 76, father  from 

massive MI at the age 58, patient has 2 brothers one vessel that is receptive 

the other one is okay, patient has cirrhosis of, has no kids, he lives with his 

wife who has 2 daughters and a son.





PHYSICAL EXAMINATION:





General: 75-year-old male who is laying down but does appear to be minimal 

distress.


HEENT: Head is atraumatic, normocephalic, pupils were equal round reactive to 

light and recommendation, extraocular muscle movement were intact, sclera 

nonicteric, conjunctivae were pale, mucous membranes of the mouth are somewhat 

dry.


Neck: Supple, no JVP, normal carotid upstroke bilaterally, no lymphadenopathy.


Chest: Decreased breath sounds at the bases, few rhonchi, no expiratory wheezes,

no chest wall tenderness, no intercostal retractions.


Heart: First heart sound is normal, second heart sounds normal.  There is 

systolic ejection murmur 2/6 occasional left sternal border.


Abdomen: Soft, nontender, nondistended, positive bowel sounds.


Extremities: There is no edema no calf tenderness DP +2 bilaterally.


Neurologic examination: Patient is awake alert and oriented X2, cranial nerves 

II-12 appear grossly intact, muscle power were 5 out of 5 in upper extremities 

and 5 out of 5 in bilateral lower extremities, deep tendon reflexes normal 

bilaterally.





ASSESSMENT AND PLAN:





1.  Elevated troponin likely related to acute on top of chronic kidney disease 

with a prior history of non-ST elevation MI.  His troponin were slightly higher 

last time.  Maintain the patient on aspirin 81 mg once every day, Plavix 75 mg 

once every day, carvedilol 12.5 mg orally twice every day, atorvastatin 80 mg 

orally once every day, continue with hydralazine 10 mg orally twice every day 

and isosorbide mononitrate 15 mg orally once every day, cardiology consultation.





2.  Acute on chronic kidney disease stage IV.  Continue patient on Rocaltrol 

0.25 mg orally twice  every week.





3.  Anemia with macrocytosis along with thrombus cytopenia.  Check B12, check 

LDH, check  reticulocyte count, check light chain protein in the form of Kappa, 

Lambda, we may need to obtain hematology consultation.





4.  Coronary artery disease with ischemic cardiomyopathy ejection fraction 20% 

on recent echocardiogram was done about a week ago, continue patient on  

carvedilol 12.5 mg orally twice every day, hydralazine 10 mg orally twice every 

day, aspirin 81 g once every day, Plavix 75 mg once every day, isosorbide 

mononitrate 15 mg orally once every day





5.  Acute on Chronic systolic heart failure.  Continue with the above treatment 

plan, hepatitis C start the patient on Lasix 40 mg IV push daily.





6.  Hypertension and hypertensive cardiovascular disease.  Continue carvedilol 

12.5 mg orally twice every day, continue hydralazine 10 mg orally twice every 

day.  





7.  Secondary hyperparathyroidism.  Continue Rocaltrol 0.25 mg orally twice a 

week.





8.  Prostate cancer status post prostatectomy.  Continue Flomax 0.4 g once every

day.





9.  Memory loss.  Patient will need to have a Mini-Mental status examination as 

an outpatient.





10.  Glaucoma.  Continue current eyedrops.





11.  DVT prophylaxis.  Continue patient on Heparin 5000 units subcutaneously 

every 12 hours. 





12.  GI prophylaxis.  Continue patient on Pepcid 20 mg orally once every day. 





13.  Admit to inpatient.  Estimate a length of stay 2 midnights. 





14.  Patient is  full code.  





Past Medical History


Past Medical History: Cancer, Diabetes Mellitus, Hyperlipidemia, Hypertension, 

Prostate Disorder, Renal Disease


Additional Past Medical History / Comment(s): Prostate cancer with surgery in 

, Patient states he has issues with his kidney's and has been seeing doctor 

Devendra since 2018.


History of Any Multi-Drug Resistant Organisms: None Reported


Past Surgical History: Orthopedic Surgery, Prostate Surgery


Additional Past Surgical History / Comment(s): RIGHT ANKLE , Bilateral 

Cataract , Colonoscopy 2018, Prostate surgery 


Past Anesthesia/Blood Transfusion Reactions: No Reported Reaction


Past Psychological History: No Psychological Hx Reported


Smoking Status: Never smoker


Past Alcohol Use History: Rare


Past Drug Use History: None Reported





- Past Family History


  ** Mother


Family Medical History: No Reported History


Additional Family Medical History / Comment(s): CHF





  ** Father


Family Medical History: No Reported History


Additional Family Medical History / Comment(s): Patient states father passed 

away from a massive heart attack.





Medications and Allergies


                                Home Medications











 Medication  Instructions  Recorded  Confirmed  Type


 


Aspirin 81 mg PO DAILY@0900 06/10/21 06/26/21 History


 


Brimonidine Tartrate [Alphagan P 1 drop BOTH EYES BID@900,2100 06/10/21 

06/26/21 History





0.2% Ophth Soln]    


 


Ferrous Gluconate 324 mg PO BID@09,2100 06/10/21 06/26/21 History


 


Isosorbide Mononitrate ER [Imdur] 15 mg PO DAILY@0900 06/10/21 06/26/21 History


 


Nephrovite 1 cap PO HS@2100 06/10/21 06/26/21 History


 


Timolol 0.5% Ophth Soln [Timoptic 1 drop BOTH EYES BID@900,2100 06/10/21 

06/26/21 History





0.5% Ophth Soln]    


 


allopurinoL [Zyloprim] 100 mg PO HS@2100 06/10/21 06/26/21 History


 


calcitrioL [Rocaltrol] 0.25 mcg PO TUFR 06/10/21 06/26/21 History


 


hydrALAZINE HCL [Apresoline] 10 mg PO BID@0900,2100 06/10/21 06/26/21 History


 


Acetaminophen Tab [Tylenol] 650 mg PO Q6HR PRN  tab 21 Rx


 


Atorvastatin [Lipitor] 80 mg PO HS@  tab 21 Rx


 


carvediloL [Coreg*] 12.5 mg PO BID@0630,1800  tab 21 Rx


 


ALPRAZolam [Xanax] 0.25 mg PO BID PRN 21 History


 


Clopidogrel [Plavix] 75 mg PO DAILY@0900 21 History


 


INSULIN LISPRO (HumaLOG) [humaLOG] See Protocol SQ ACHS@08,12,17,21 21 History


 


Insulin Glargine,Hum.rec.anlog 15 unit SQ HS@2100 21 History





[Semglee Pen]    


 


Lactose-Reduced Food [Ensure Plus] 240 ml PO BID@0900,1700 21 

History


 


Nitroglycerin Sl Tabs [Nitrostat] 0.4 mg PO Q5M PRN 21 History


 


Tamsulosin [Flomax] 0.4 mg PO DAILY@0900 21 History








                                    Allergies











Allergy/AdvReac Type Severity Reaction Status Date / Time


 


Fish Containing Products Allergy  Swelling Verified 06/10/21 21:43





[Fish]     


 


Sulfa (Sulfonamide Allergy  Unknown Verified 06/10/21 21:43





Antibiotics)   Childhood  














Physical Exam


Vitals: 


                                   Vital Signs











  Temp Pulse Pulse Resp BP BP BP


 


 21 08:00  96.3 F L   67  20   137/80 


 


 21 04:00  97.6 F   69  17   154/87 


 


 21 01:26    69  19   


 


 21 00:00  97.9 F   69  19   145/80 


 


 21 21:20  97.7 F   73  17    144/87


 


 21 20:54   98   18  139/80  


 


 21 20:43    69  19    145/80


 


 21 19:16   75   18  137/82  


 


 21 18:53  98.7 F  75   20  122/88  














  Pulse Ox


 


 21 08:00  97


 


 21 04:00  98


 


 21 01:26 


 


 21 00:00  100


 


 21 21:20  100


 


 21 20:54  98


 


 21 20:43  100


 


 21 19:16  96


 


 21 18:53  97








                                Intake and Output











 21





 22:59 06:59 14:59


 


Intake Total  490 


 


Balance  490 


 


Intake:   


 


  Intake, IV Titration  490 





  Amount   


 


    Sodium Chloride 0.9% 1,  490 





    000 ml @ 70 mls/hr IV .   





    [id number] STA Rx#:[id number]   


 


Other:   


 


  Voiding Method  Diaper Diaper





  Incontinent 


 


  # Voids  2 


 


  # Bowel Movements  1 


 


  Weight 64.5 kg 64 kg 














Results


CBC & Chem 7: 


                                 21 02:45





                                 21 02:45


Labs: 


                  Abnormal Lab Results - Last 24 Hours (Table)











  21 Range/Units





  19:04 19:04 19:04 


 


RBC  2.98 L    (4.30-5.90)  m/uL


 


Hgb  9.6 L    (13.0-17.5)  gm/dL


 


Hct  31.4 L    (39.0-53.0)  %


 


MCV  105.6 H    (80.0-100.0)  fL


 


MCHC  30.6 L    (31.0-37.0)  g/dL


 


RDW  16.1 H    (11.5-15.5)  %


 


Plt Count  110 L    (150-450)  k/uL


 


Lymphocytes #  0.6 L    (1.0-4.8)  k/uL


 


PT   12.8 H   (9.0-12.0)  sec


 


INR   1.2 H   (<1.2)  


 


Chloride    117 H  ()  mmol/L


 


Carbon Dioxide    19 L  (22-30)  mmol/L


 


BUN    54 H  (9-20)  mg/dL


 


Creatinine    2.13 H  (0.66-1.25)  mg/dL


 


Glucose    148 H  (74-99)  mg/dL


 


POC Glucose (mg/dL)     (75-99)  mg/dL


 


Calcium    7.9 L  (8.4-10.2)  mg/dL


 


Magnesium    2.6 H  (1.6-2.3)  mg/dL


 


ALT     (4-49)  U/L


 


Alkaline Phosphatase     ()  U/L


 


Troponin I     (0.000-0.034)  ng/mL


 


Total Protein     (6.3-8.2)  g/dL


 


Albumin     (3.5-5.0)  g/dL














  21 Range/Units





  19:04 23:46 02:45 


 


RBC     (4.30-5.90)  m/uL


 


Hgb     (13.0-17.5)  gm/dL


 


Hct     (39.0-53.0)  %


 


MCV     (80.0-100.0)  fL


 


MCHC     (31.0-37.0)  g/dL


 


RDW     (11.5-15.5)  %


 


Plt Count     (150-450)  k/uL


 


Lymphocytes #     (1.0-4.8)  k/uL


 


PT     (9.0-12.0)  sec


 


INR     (<1.2)  


 


Chloride     ()  mmol/L


 


Carbon Dioxide     (22-30)  mmol/L


 


BUN     (9-20)  mg/dL


 


Creatinine     (0.66-1.25)  mg/dL


 


Glucose     (74-99)  mg/dL


 


POC Glucose (mg/dL)     (75-99)  mg/dL


 


Calcium     (8.4-10.2)  mg/dL


 


Magnesium     (1.6-2.3)  mg/dL


 


ALT     (4-49)  U/L


 


Alkaline Phosphatase     ()  U/L


 


Troponin I  0.131 H*  0.128 H*  0.114 H*  (0.000-0.034)  ng/mL


 


Total Protein     (6.3-8.2)  g/dL


 


Albumin     (3.5-5.0)  g/dL














  21 Range/Units





  02:45 02:45 08:01 


 


RBC  2.67 L    (4.30-5.90)  m/uL


 


Hgb  9.0 L    (13.0-17.5)  gm/dL


 


Hct  27.7 L    (39.0-53.0)  %


 


MCV  103.4 H    (80.0-100.0)  fL


 


MCHC     (31.0-37.0)  g/dL


 


RDW  15.8 H    (11.5-15.5)  %


 


Plt Count  89 L    (150-450)  k/uL


 


Lymphocytes #     (1.0-4.8)  k/uL


 


PT     (9.0-12.0)  sec


 


INR     (<1.2)  


 


Chloride   117 H   ()  mmol/L


 


Carbon Dioxide   19 L   (22-30)  mmol/L


 


BUN   48 H   (9-20)  mg/dL


 


Creatinine   2.12 H   (0.66-1.25)  mg/dL


 


Glucose   127 H   (74-99)  mg/dL


 


POC Glucose (mg/dL)    138 H  (75-99)  mg/dL


 


Calcium   7.7 L   (8.4-10.2)  mg/dL


 


Magnesium     (1.6-2.3)  mg/dL


 


ALT   61 H   (4-49)  U/L


 


Alkaline Phosphatase   130 H   ()  U/L


 


Troponin I     (0.000-0.034)  ng/mL


 


Total Protein   5.2 L   (6.3-8.2)  g/dL


 


Albumin   2.5 L   (3.5-5.0)  g/dL














Thrombosis Risk Factor Assmnt





- Choose All That Apply


Other Risk Factors: Yes


Each Risk Factor Represents 2 Points: Age 61-74 years


Other congenital or acquired thrombophilia - If yes, enter type in comment: No


Thrombosis Risk Factor Assessment Total Risk Factor Score: 2


Thrombosis Risk Factor Assessment Level: Low Risk

## 2021-06-28 LAB
ALBUMIN SERPL-MCNC: 2.7 G/DL (ref 3.5–5)
ALP SERPL-CCNC: 128 U/L (ref 38–126)
ALT SERPL-CCNC: 53 U/L (ref 4–49)
ANION GAP SERPL CALC-SCNC: 8 MMOL/L
AST SERPL-CCNC: 26 U/L (ref 17–59)
BASOPHILS # BLD AUTO: 0 K/UL (ref 0–0.2)
BASOPHILS NFR BLD AUTO: 0 %
BUN SERPL-SCNC: 55 MG/DL (ref 9–20)
CALCIUM SPEC-MCNC: 8.1 MG/DL (ref 8.4–10.2)
CHLORIDE SERPL-SCNC: 116 MMOL/L (ref 98–107)
CO2 SERPL-SCNC: 23 MMOL/L (ref 22–30)
EOSINOPHIL # BLD AUTO: 0.1 K/UL (ref 0–0.7)
EOSINOPHIL NFR BLD AUTO: 1 %
ERYTHROCYTE [DISTWIDTH] IN BLOOD BY AUTOMATED COUNT: 2.93 M/UL (ref 4.3–5.9)
ERYTHROCYTE [DISTWIDTH] IN BLOOD: 16 % (ref 11.5–15.5)
GLUCOSE BLD-MCNC: 108 MG/DL (ref 75–99)
GLUCOSE BLD-MCNC: 111 MG/DL (ref 75–99)
GLUCOSE BLD-MCNC: 119 MG/DL (ref 75–99)
GLUCOSE BLD-MCNC: 61 MG/DL (ref 75–99)
GLUCOSE BLD-MCNC: 75 MG/DL (ref 75–99)
GLUCOSE BLD-MCNC: 93 MG/DL (ref 75–99)
GLUCOSE SERPL-MCNC: 69 MG/DL (ref 74–99)
HCT VFR BLD AUTO: 30.8 % (ref 39–53)
HGB BLD-MCNC: 9.5 GM/DL (ref 13–17.5)
KAPPA LC FREE SER NEPH-MCNC: 11.5 MG/DL (ref 0.33–1.94)
LYMPHOCYTES # SPEC AUTO: 0.9 K/UL (ref 1–4.8)
LYMPHOCYTES NFR SPEC AUTO: 12 %
MCH RBC QN AUTO: 32.4 PG (ref 25–35)
MCHC RBC AUTO-ENTMCNC: 30.8 G/DL (ref 31–37)
MCV RBC AUTO: 105 FL (ref 80–100)
MONOCYTES # BLD AUTO: 0.5 K/UL (ref 0–1)
MONOCYTES NFR BLD AUTO: 7 %
NEUTROPHILS # BLD AUTO: 5.5 K/UL (ref 1.3–7.7)
NEUTROPHILS NFR BLD AUTO: 78 %
PLATELET # BLD AUTO: 102 K/UL (ref 150–450)
POTASSIUM SERPL-SCNC: 3.5 MMOL/L (ref 3.5–5.1)
PROT SERPL-MCNC: 5.6 G/DL (ref 6.3–8.2)
SODIUM SERPL-SCNC: 147 MMOL/L (ref 137–145)
WBC # BLD AUTO: 7.1 K/UL (ref 3.8–10.6)

## 2021-06-28 RX ADMIN — INSULIN ASPART SCH: 100 INJECTION, SOLUTION INTRAVENOUS; SUBCUTANEOUS at 08:16

## 2021-06-28 RX ADMIN — ATORVASTATIN CALCIUM SCH MG: 80 TABLET, FILM COATED ORAL at 22:03

## 2021-06-28 RX ADMIN — INSULIN ASPART SCH: 100 INJECTION, SOLUTION INTRAVENOUS; SUBCUTANEOUS at 17:24

## 2021-06-28 RX ADMIN — INSULIN ASPART SCH: 100 INJECTION, SOLUTION INTRAVENOUS; SUBCUTANEOUS at 21:12

## 2021-06-28 RX ADMIN — INSULIN DETEMIR SCH: 100 INJECTION, SOLUTION SUBCUTANEOUS at 21:12

## 2021-06-28 RX ADMIN — CLOPIDOGREL BISULFATE SCH: 75 TABLET ORAL at 11:19

## 2021-06-28 RX ADMIN — BRIMONIDINE TARTRATE SCH DROPS: 2 SOLUTION/ DROPS OPHTHALMIC at 22:04

## 2021-06-28 RX ADMIN — BRIMONIDINE TARTRATE SCH DROPS: 2 SOLUTION/ DROPS OPHTHALMIC at 10:40

## 2021-06-28 RX ADMIN — HEPARIN SODIUM SCH UNIT: 5000 INJECTION INTRAVENOUS; SUBCUTANEOUS at 22:03

## 2021-06-28 RX ADMIN — TAMSULOSIN HYDROCHLORIDE SCH: 0.4 CAPSULE ORAL at 11:20

## 2021-06-28 RX ADMIN — FUROSEMIDE SCH MG: 10 INJECTION, SOLUTION INTRAMUSCULAR; INTRAVENOUS at 10:39

## 2021-06-28 RX ADMIN — Medication SCH EACH: at 22:03

## 2021-06-28 RX ADMIN — TIMOLOL MALEATE SCH DROPS: 5 SOLUTION OPHTHALMIC at 10:40

## 2021-06-28 RX ADMIN — TIMOLOL MALEATE SCH DROPS: 5 SOLUTION OPHTHALMIC at 22:03

## 2021-06-28 RX ADMIN — FAMOTIDINE SCH: 20 TABLET, FILM COATED ORAL at 11:19

## 2021-06-28 RX ADMIN — ASPIRIN 81 MG CHEWABLE TABLET SCH: 81 TABLET CHEWABLE at 11:19

## 2021-06-28 RX ADMIN — HEPARIN SODIUM SCH UNIT: 5000 INJECTION INTRAVENOUS; SUBCUTANEOUS at 10:39

## 2021-06-28 RX ADMIN — ISOSORBIDE MONONITRATE SCH: 30 TABLET, EXTENDED RELEASE ORAL at 11:19

## 2021-06-28 RX ADMIN — INSULIN ASPART SCH: 100 INJECTION, SOLUTION INTRAVENOUS; SUBCUTANEOUS at 12:03

## 2021-06-28 NOTE — XR
EXAMINATION TYPE: XR chest 1V

 

DATE OF EXAM: 6/28/2021

 

COMPARISON: 6/11/2021

 

HISTORY: Congestive heart failure

 

TECHNIQUE: Single frontal view of the chest is obtained.

 

FINDINGS:  Low lung volumes. Mild cardiomegaly. Multiple overlying leads. Small left pleural effusion
. Tiny right pleural effusion. Bibasilar airspace opacities suggestive of atelectasis or developing p
neumonitis. Crowding of the central pulmonary interstitium. No pneumothorax.

 

IMPRESSION:  

1. Mild cardiomegaly. Small left and tiny right pleural effusions. Crowding the central pulmonary int
erstitium. Findings may represent early congestive heart failure.

## 2021-06-29 LAB
ANION GAP SERPL CALC-SCNC: 9.6 MMOL/L (ref 4–12)
BASOPHILS # BLD AUTO: 0.01 X 10*3/UL (ref 0–0.1)
BASOPHILS NFR BLD AUTO: 0.1 %
BUN SERPL-SCNC: 52 MG/DL (ref 9–27)
BUN/CREAT SERPL: 22.61 RATIO (ref 12–20)
CALCIUM SPEC-MCNC: 7.7 MG/DL (ref 8.7–10.3)
CHLORIDE SERPL-SCNC: 114 MMOL/L (ref 96–109)
CO2 SERPL-SCNC: 22.4 MMOL/L (ref 21.6–31.8)
EOSINOPHIL # BLD AUTO: 0.04 X 10*3/UL (ref 0.04–0.35)
EOSINOPHIL NFR BLD AUTO: 0.6 %
ERYTHROCYTE [DISTWIDTH] IN BLOOD BY AUTOMATED COUNT: 2.95 X 10*6/UL (ref 4.4–5.6)
ERYTHROCYTE [DISTWIDTH] IN BLOOD: 16.2 % (ref 11.5–14.5)
GLUCOSE BLD-MCNC: 126 MG/DL (ref 75–99)
GLUCOSE BLD-MCNC: 172 MG/DL (ref 75–99)
GLUCOSE BLD-MCNC: 265 MG/DL (ref 75–99)
GLUCOSE BLD-MCNC: 317 MG/DL (ref 75–99)
GLUCOSE SERPL-MCNC: 140 MG/DL (ref 70–110)
HCT VFR BLD AUTO: 31.3 % (ref 39.6–50)
HGB BLD-MCNC: 9.6 G/DL (ref 13–17)
LYMPHOCYTES # SPEC AUTO: 0.55 X 10*3/UL (ref 0.9–5)
LYMPHOCYTES NFR SPEC AUTO: 8.1 %
MCH RBC QN AUTO: 32.5 PG (ref 27–32)
MCHC RBC AUTO-ENTMCNC: 30.7 G/DL (ref 32–37)
MCV RBC AUTO: 106.1 FL (ref 80–97)
MONOCYTES # BLD AUTO: 0.45 X 10*3/UL (ref 0.2–1)
MONOCYTES NFR BLD AUTO: 6.6 %
NEUTROPHILS # BLD AUTO: 5.73 X 10*3/UL (ref 1.8–7.7)
NEUTROPHILS NFR BLD AUTO: 84.2 %
PLATELET # BLD AUTO: 109 X 10*3/UL (ref 140–440)
POTASSIUM SERPL-SCNC: 4.1 MMOL/L (ref 3.5–5.5)
SODIUM SERPL-SCNC: 146 MMOL/L (ref 135–145)
WBC # BLD AUTO: 6.81 X 10*3/UL (ref 4.5–10)

## 2021-06-29 RX ADMIN — INSULIN ASPART SCH UNIT: 100 INJECTION, SOLUTION INTRAVENOUS; SUBCUTANEOUS at 09:26

## 2021-06-29 RX ADMIN — INSULIN ASPART SCH: 100 INJECTION, SOLUTION INTRAVENOUS; SUBCUTANEOUS at 20:48

## 2021-06-29 RX ADMIN — Medication SCH EACH: at 21:01

## 2021-06-29 RX ADMIN — Medication SCH: at 21:21

## 2021-06-29 RX ADMIN — HEPARIN SODIUM SCH UNIT: 5000 INJECTION INTRAVENOUS; SUBCUTANEOUS at 09:26

## 2021-06-29 RX ADMIN — ISOSORBIDE MONONITRATE SCH MG: 30 TABLET, EXTENDED RELEASE ORAL at 09:25

## 2021-06-29 RX ADMIN — ATORVASTATIN CALCIUM SCH MG: 80 TABLET, FILM COATED ORAL at 21:02

## 2021-06-29 RX ADMIN — INSULIN ASPART SCH UNIT: 100 INJECTION, SOLUTION INTRAVENOUS; SUBCUTANEOUS at 15:02

## 2021-06-29 RX ADMIN — TIMOLOL MALEATE SCH DROPS: 5 SOLUTION OPHTHALMIC at 21:01

## 2021-06-29 RX ADMIN — HEPARIN SODIUM SCH UNIT: 5000 INJECTION INTRAVENOUS; SUBCUTANEOUS at 21:01

## 2021-06-29 RX ADMIN — FAMOTIDINE SCH: 20 TABLET, FILM COATED ORAL at 09:28

## 2021-06-29 RX ADMIN — CLOPIDOGREL BISULFATE SCH MG: 75 TABLET ORAL at 09:26

## 2021-06-29 RX ADMIN — BRIMONIDINE TARTRATE SCH DROPS: 2 SOLUTION/ DROPS OPHTHALMIC at 09:27

## 2021-06-29 RX ADMIN — INSULIN ASPART SCH UNIT: 100 INJECTION, SOLUTION INTRAVENOUS; SUBCUTANEOUS at 17:15

## 2021-06-29 RX ADMIN — TIMOLOL MALEATE SCH DROPS: 5 SOLUTION OPHTHALMIC at 09:27

## 2021-06-29 RX ADMIN — FUROSEMIDE SCH MG: 10 INJECTION, SOLUTION INTRAMUSCULAR; INTRAVENOUS at 09:26

## 2021-06-29 RX ADMIN — TAMSULOSIN HYDROCHLORIDE SCH MG: 0.4 CAPSULE ORAL at 09:25

## 2021-06-29 RX ADMIN — INSULIN DETEMIR SCH: 100 INJECTION, SOLUTION SUBCUTANEOUS at 20:49

## 2021-06-29 RX ADMIN — BRIMONIDINE TARTRATE SCH DROPS: 2 SOLUTION/ DROPS OPHTHALMIC at 21:01

## 2021-06-29 RX ADMIN — ASPIRIN 81 MG CHEWABLE TABLET SCH MG: 81 TABLET CHEWABLE at 09:25

## 2021-06-29 RX ADMIN — INSULIN ASPART SCH: 100 INJECTION, SOLUTION INTRAVENOUS; SUBCUTANEOUS at 13:46

## 2021-06-30 LAB
GLUCOSE BLD-MCNC: 121 MG/DL (ref 75–99)
GLUCOSE BLD-MCNC: 127 MG/DL (ref 75–99)
GLUCOSE BLD-MCNC: 247 MG/DL (ref 75–99)
GLUCOSE BLD-MCNC: 302 MG/DL (ref 75–99)

## 2021-06-30 RX ADMIN — CLOPIDOGREL BISULFATE SCH MG: 75 TABLET ORAL at 10:16

## 2021-06-30 RX ADMIN — INSULIN ASPART SCH UNIT: 100 INJECTION, SOLUTION INTRAVENOUS; SUBCUTANEOUS at 13:02

## 2021-06-30 RX ADMIN — TIMOLOL MALEATE SCH DROPS: 5 SOLUTION OPHTHALMIC at 10:17

## 2021-06-30 RX ADMIN — BRIMONIDINE TARTRATE SCH DROPS: 2 SOLUTION/ DROPS OPHTHALMIC at 10:17

## 2021-06-30 RX ADMIN — INSULIN ASPART SCH: 100 INJECTION, SOLUTION INTRAVENOUS; SUBCUTANEOUS at 16:58

## 2021-06-30 RX ADMIN — INSULIN ASPART SCH: 100 INJECTION, SOLUTION INTRAVENOUS; SUBCUTANEOUS at 22:16

## 2021-06-30 RX ADMIN — FAMOTIDINE SCH MG: 20 TABLET, FILM COATED ORAL at 10:16

## 2021-06-30 RX ADMIN — HEPARIN SODIUM SCH UNIT: 5000 INJECTION INTRAVENOUS; SUBCUTANEOUS at 10:18

## 2021-06-30 RX ADMIN — TIMOLOL MALEATE SCH DROPS: 5 SOLUTION OPHTHALMIC at 22:16

## 2021-06-30 RX ADMIN — INSULIN DETEMIR SCH: 100 INJECTION, SOLUTION SUBCUTANEOUS at 22:16

## 2021-06-30 RX ADMIN — ATORVASTATIN CALCIUM SCH MG: 80 TABLET, FILM COATED ORAL at 22:14

## 2021-06-30 RX ADMIN — BRIMONIDINE TARTRATE SCH DROPS: 2 SOLUTION/ DROPS OPHTHALMIC at 22:16

## 2021-06-30 RX ADMIN — Medication SCH EACH: at 22:14

## 2021-06-30 RX ADMIN — ISOSORBIDE MONONITRATE SCH MG: 30 TABLET, EXTENDED RELEASE ORAL at 10:16

## 2021-06-30 RX ADMIN — TAMSULOSIN HYDROCHLORIDE SCH MG: 0.4 CAPSULE ORAL at 10:16

## 2021-06-30 RX ADMIN — ASPIRIN 81 MG CHEWABLE TABLET SCH MG: 81 TABLET CHEWABLE at 10:16

## 2021-06-30 RX ADMIN — FUROSEMIDE SCH MG: 10 INJECTION, SOLUTION INTRAMUSCULAR; INTRAVENOUS at 10:17

## 2021-06-30 RX ADMIN — INSULIN ASPART SCH UNIT: 100 INJECTION, SOLUTION INTRAVENOUS; SUBCUTANEOUS at 10:18

## 2021-06-30 RX ADMIN — HEPARIN SODIUM SCH UNIT: 5000 INJECTION INTRAVENOUS; SUBCUTANEOUS at 22:15

## 2021-06-30 NOTE — P.CN
Psychiatric Consult





- .


Consult date: 06/30/21


Consult:: 





06/30/21 14:00


IDENTIFYING DATA: This patient is a 75-year-old  male with a history of

dementia who currently lives at [facility] as 1 kid.





REASON FOR REFERRAL: Psychiatry was consulted for depression, recent NSTEMI





HISTORY OF PRESENT ILLNESS: The patient presented to the hospital and according 

to ER report patient was admitted for failure to thrive and having black stools.

 Patient was also shown to be depressed and withdrawn and having a poor 

appetite.  Patient's nurse claims that the patient has been getting one-word 

answers not eating well as been fairly constricted in his affect.  Patient was 

seen at the bedside sitting on his chair and agreeable to speak to writer.  He 

was fairly constricted and only gave one-word answers and very monotone.  He 

denied any complaints and states that he is doing "okay".  He spoke about going 

back to the nursing home.  He is denying any depression today or anxiety.  He 

appeared to speak very slowly and hesitant with his words.  He was alert and 

oriented to person place however does not know today's date.  He claims that his

sleep is fair and appetite is poor. At this time patient denies any suicidal or 

homical ideations, intent or plan. Patient denies any auditory, visual 

hallucinations and denies any paranoia or delusions. Patients admits to using no

recreational drugs





PAST PSYCHIATRIC HISTORY: Patient has a a history of dementia. Patient denies 

being on any psychiatric medications. Patient denies any previous psychiatric 

hospitalizations. Patient denies any psychiatric outpatient follow-up. Patient 

denies any history of suicide attempts in the past.





PAST MEDICAL HISTORY: Dementia, diabetes mellitus, hypertension, hyperlipidemia,

prostate cancer. 





ALLERGIES: as per EMR. 





CHEMICAL DEPENDENCY HISTORY: as per HPI. 





FAMILY PSYCHIATRIC/SUBSTANCE USE HISTORY: denies





SOCIAL HISTORY: Patient was born and raised in [facility].  He states that

he completed college and worked as a psychologist in the past.





MENTAL STATUS EXAM: 


General Appearance: Patient appears to be thin, stated age is alert, 

constricted. Patient appears to have fair hygiene and grooming wearing hospital 

gown with poor eye contact.


Behavior: Patient is calmly sitting on the chair without any agitated behavior.


Speech: Patient's speech is fluent and nonpressured.  Monotone.  Hesitant.


Mood/Affect: Patient reports their mood is "ok", affect is congruent and 

constricted


Suicidality/Homicidality:  Patient denies having any suicidal or homicidal 

ideation intent or plan.  


Perceptions: Patient denies any visual hallucinations and denies any auditory 

hallucinations  


Though content/process: [place], poverty of content.  Poor historian.


Memory and concentration: AOX2, know today's date, Can spell "WORLD" backwards


Judgment and insight: limited





IMPRESSIONS: 


Depressive disorder unspecified, rule out adjustment disorder


History of dementia





PLAN: 


-At this time patient DOES NOT meet criteria for inpatient psychiatric 

admission.


-Delirium precautions recommended with patient including - avoiding use of 

narcotics and CNS sedatives, limit anticholinergic medications when possible, 

frequent re-orientation, minimize use of restraints, open window shades during 

the day and close them at night


-Would recommend the following medication changes/additions: Remeron 7.5 mg 

daily at bedtime for insomnia/mood/appetite.


- to provide patient with outpatient mental health/psychiatry 

resources for appropriate follow up upon discharge


-Patient will be discharged back to [facility] today. 


-Communicated plan to patient's nurse


-Psychiatry will sign off at this time


-Please contact with any questions.

## 2021-06-30 NOTE — P.DS
Providers


Date of admission: 


06/26/21 20:33





Expected date of discharge: 06/30/21


Attending physician: 


Saroj Phan





Consults: 





                                        





06/26/21 23:45


Consult Physician Routine 


   Consulting Provider: Aguilar Camejo


   Consult Reason/Comments: Elevated troponin


   Do you want consulting provider notified?: Yes, Notify in am





06/29/21 14:20


Consult Physician Routine 


   Consulting Provider: Adán Thornton


   Consult Reason/Comments: depression,recent NSTEMI


   Do you want consulting provider notified?: Yes











Primary care physician: 


Luna Patricia





Hospital Course: 





Final Diagnoses:





Acute on Chronic systolic CHF


Acute on Chronic renal failure IV, acute related to poor oral intake, 

cardiorenal syndrome.  Creatinine at baseline.


Recent NSTEMI, conservative management


Elevated troponins,decreased from prior admission,related to the above and acute

renal failure


Severe protein calorie malnutrition, BMI 22.4 


Failure to thrive, multifactorial, including the above and #1


Acute dehydration


Hypokalemia secondary to poor intake and diuretics, resolved


Anemia of chronic kidney disease


Thrombocytopenia, evaluated by oncology/hematology last visit, bone marrow 

biopsy pending OP.


Diabetes mellitus, hyperglycemic, long-acting insulin increased


Ischemic cardiomyopathy, EF less than 20%


Moderate pulmonary hypertension


Multivalvular disease


Depression














Hospital course:This is a 75-year-old male one of  Dr. Phan with a previous 

medical history significant for hypertension and hypertensive cardiovascular 

disease with left ventricular hypertrophy, history of ischemic cardiomyopathy 

ejection fraction of 20%, non-ST elevation myocardial infarction, moderate 

mitral regurgitation tricuspid regurgitation with severe pulmonary hypertension,

diabetes mellitus type 2, chronic kidney disease stage IV, anemia, history of 

thrombocytopenia, recently hospitalized at [facility] with non-ST 

elevation MI as well as acute systolic heart failure. Patient elected 

conservative management,discharged on 06/16/2021 [place] [facility] [place]  Return to [facility] secondary to poor oral intake, black tarry stools, 

abdominal pain, abnormal labs.  CT of abdomen and pelvis report pleural 

effusions, basilar infiltrates, acute CHF exacerbation, mild abdominal ascites.





06/28/2021 F/U chest x-ray suggesting acute early CHF.  diuresing well on Lasix 

IV push.  Creatinine up to 2.32 diet intake poor, consumed 0% of breakfast, 0% 

of lunch.  Platelets trending down and currently 89, hematology followed on last

admit and had discussed outpatient bone marrow biopsy, which is still pending.  

Maintaining O2 sats in the 90s on room air.











6/29/21 Appears withdrawn, depressed .Diet intake remains poor, even with 

spouse's encouragement.  Diuresing well on Lasix IV, with 24-hour I&O reflecting

a weight loss of 2 kg.  BUN 52, creatinine 2.3.  Afebrile, normal WBC, platelets

incr. to 109.  Maintaining O2 sats in the high 90s on room air.  Denies chest 

pain, palpitations.














PCP, Dr. Phan discussed with wife patient's poor prognosis.  Spouse is very 

much aware that patient's oral intake has been extremely poor, minimal to no 

intake over the last 4 weeks and continued during this admission despite ongoing

encouragement,.  CODE STATUS per their discussion changed to no code, no CPR, no

intubation.  Creatinine at baseline, Lasix resumed at discharge with close 

monitoring of renal function, electrolytes.  Patient will be discharged to 

[facility] subacute rehab in a stable condition with guarded prognosis. Wife is 

interested in palliative care.











The impression and plan of care has been dictated as directed.





:


I performed a history and examination of this patient,  discussed the same with 

the dictator.  I agree with the dictator's note ,documented as a scribe.  Any 

additional findings or plans will be noted.














Patient Condition at Discharge: Stable





Plan - Discharge Summary


Discharge Rx Participant: No


New Discharge Prescriptions: 


New


   Furosemide [Lasix] 40 mg PO DAILY #30 tablet


   Glucerna Shake 1 can PO TID BETWEEN MEALS #18 can





Continue


   calcitrioL [Rocaltrol] 0.25 mcg PO TUFR


   Brimonidine Tartrate [Alphagan P 0.2% Ophth Soln] 1 drop BOTH EYES 

BID@0900,2100


   allopurinoL [Zyloprim] 100 mg PO HS@2100


   Aspirin 81 mg PO DAILY@0900


   carvediloL [Coreg*] 12.5 mg PO BID@0630,1800  tab


   Acetaminophen Tab [Tylenol] 650 mg PO Q6HR PRN  tab


     PRN Reason: Mild Pain Or Fever > 100.5


   INSULIN LISPRO (HumaLOG) [humaLOG] See Protocol SQ ACHS@08,12,17,21


   Tamsulosin [Flomax] 0.4 mg PO DAILY@0900


   Clopidogrel [Plavix] 75 mg PO DAILY@0900


   Nitroglycerin Sl Tabs [Nitrostat] 0.4 mg PO Q5M PRN


     PRN Reason: Chest Pain


   Timolol 0.5% Ophth Soln [Timoptic 0.5% Ophth Soln] 1 drop BOTH EYES 

BID@0900,2100


   hydrALAZINE HCL [Apresoline] 10 mg PO BID@0900,2100


   Isosorbide Mononitrate ER [Imdur] 15 mg PO DAILY@0900


   Ferrous Gluconate 324 mg PO BID@0900,2100


   Nephrovite 1 cap PO HS@2100


   Atorvastatin [Lipitor] 80 mg PO HS@2100  tab


   Lactose-Reduced Food [Ensure Plus] 240 ml PO BID@0900,1700





Changed


   Insulin Glargine,Hum.rec.anlog [Semglee Pen] 20 unit SQ HS@2100 #0





Discontinued


   ALPRAZolam [Xanax] 0.25 mg PO BID PRN


     PRN Reason: Anxiety


Discharge Medication List





Aspirin 81 mg PO DAILY@0900 06/10/21 [History]


Brimonidine Tartrate [Alphagan P 0.2% Ophth Soln] 1 drop BOTH EYES BID@0900,2100

06/10/21 [History]


Ferrous Gluconate 324 mg PO BID@0900,2100 06/10/21 [History]


Isosorbide Mononitrate ER [Imdur] 15 mg PO DAILY@0900 06/10/21 [History]


Nephrovite 1 cap PO HS@2100 06/10/21 [History]


Timolol 0.5% Ophth Soln [Timoptic 0.5% Ophth Soln] 1 drop BOTH EYES 

BID@0900,2100 06/10/21 [History]


allopurinoL [Zyloprim] 100 mg PO HS@2100 06/10/21 [History]


calcitrioL [Rocaltrol] 0.25 mcg PO TUFR 06/10/21 [History]


hydrALAZINE HCL [Apresoline] 10 mg PO BID@0900,2100 06/10/21 [History]


Acetaminophen Tab [Tylenol] 650 mg PO Q6HR PRN  tab 06/14/21 [Rx]


Atorvastatin [Lipitor] 80 mg PO HS@2100  tab 06/14/21 [Rx]


carvediloL [Coreg*] 12.5 mg PO BID@0630,1800  tab 06/14/21 [Rx]


Clopidogrel [Plavix] 75 mg PO DAILY@0900 06/26/21 [History]


INSULIN LISPRO (HumaLOG) [humaLOG] See Protocol SQ ACHS@08,12,17,21 06/26/21 

[History]


Lactose-Reduced Food [Ensure Plus] 240 ml PO BID@0900,1700 06/26/21 [History]


Nitroglycerin Sl Tabs [Nitrostat] 0.4 mg PO Q5M PRN 06/26/21 [History]


Tamsulosin [Flomax] 0.4 mg PO DAILY@0900 06/26/21 [History]


Furosemide [Lasix] 40 mg PO DAILY #30 tablet 06/30/21 [Rx]


Glucerna Shake 1 can PO TID BETWEEN MEALS #18 can 06/30/21 [Rx]


Insulin Glargine,Hum.rec.anlog [Semglee Pen] 20 unit SQ HS@2100 #0 06/30/21 [Rx]








Follow up Appointment(s)/Referral(s): 


Luna Patricia MD [Primary Care Provider] - 1-2 days


Activity/Diet/Wound Care/Special Instructions: 


myrtle sky,bmp 2 days








Discharge Disposition: TRANSFER TO SNF/ECF

## 2021-07-01 VITALS
SYSTOLIC BLOOD PRESSURE: 147 MMHG | HEART RATE: 74 BPM | RESPIRATION RATE: 16 BRPM | DIASTOLIC BLOOD PRESSURE: 84 MMHG | TEMPERATURE: 97.6 F

## 2021-07-01 LAB — GLUCOSE BLD-MCNC: 187 MG/DL (ref 75–99)

## 2021-07-01 RX ADMIN — HEPARIN SODIUM SCH UNIT: 5000 INJECTION INTRAVENOUS; SUBCUTANEOUS at 09:37

## 2021-07-01 RX ADMIN — INSULIN ASPART SCH UNIT: 100 INJECTION, SOLUTION INTRAVENOUS; SUBCUTANEOUS at 09:33

## 2021-07-01 RX ADMIN — ISOSORBIDE MONONITRATE SCH MG: 30 TABLET, EXTENDED RELEASE ORAL at 09:35

## 2021-07-01 RX ADMIN — FAMOTIDINE SCH MG: 20 TABLET, FILM COATED ORAL at 09:37

## 2021-07-01 RX ADMIN — TIMOLOL MALEATE SCH DROPS: 5 SOLUTION OPHTHALMIC at 09:43

## 2021-07-01 RX ADMIN — BRIMONIDINE TARTRATE SCH DROPS: 2 SOLUTION/ DROPS OPHTHALMIC at 09:37

## 2021-07-01 RX ADMIN — CLOPIDOGREL BISULFATE SCH MG: 75 TABLET ORAL at 09:37

## 2021-07-01 RX ADMIN — TAMSULOSIN HYDROCHLORIDE SCH MG: 0.4 CAPSULE ORAL at 09:37

## 2021-07-01 RX ADMIN — FUROSEMIDE SCH: 10 INJECTION, SOLUTION INTRAMUSCULAR; INTRAVENOUS at 09:38

## 2021-07-01 RX ADMIN — ASPIRIN 81 MG CHEWABLE TABLET SCH MG: 81 TABLET CHEWABLE at 09:34

## 2021-07-05 ENCOUNTER — HOSPITAL ENCOUNTER (INPATIENT)
Dept: HOSPITAL 47 - EC | Age: 76
LOS: 2 days | Discharge: HOSPICE | DRG: 280 | End: 2021-07-07
Attending: FAMILY MEDICINE | Admitting: FAMILY MEDICINE
Payer: MEDICARE

## 2021-07-05 DIAGNOSIS — D53.9: ICD-10-CM

## 2021-07-05 DIAGNOSIS — F03.90: ICD-10-CM

## 2021-07-05 DIAGNOSIS — Z85.46: ICD-10-CM

## 2021-07-05 DIAGNOSIS — Z79.4: ICD-10-CM

## 2021-07-05 DIAGNOSIS — Z79.899: ICD-10-CM

## 2021-07-05 DIAGNOSIS — N17.9: ICD-10-CM

## 2021-07-05 DIAGNOSIS — Z66: ICD-10-CM

## 2021-07-05 DIAGNOSIS — I27.20: ICD-10-CM

## 2021-07-05 DIAGNOSIS — Z82.49: ICD-10-CM

## 2021-07-05 DIAGNOSIS — I25.5: ICD-10-CM

## 2021-07-05 DIAGNOSIS — D63.1: ICD-10-CM

## 2021-07-05 DIAGNOSIS — F32.9: ICD-10-CM

## 2021-07-05 DIAGNOSIS — I50.23: ICD-10-CM

## 2021-07-05 DIAGNOSIS — E78.5: ICD-10-CM

## 2021-07-05 DIAGNOSIS — N18.4: ICD-10-CM

## 2021-07-05 DIAGNOSIS — N40.0: ICD-10-CM

## 2021-07-05 DIAGNOSIS — E43: ICD-10-CM

## 2021-07-05 DIAGNOSIS — Z51.5: ICD-10-CM

## 2021-07-05 DIAGNOSIS — R62.7: ICD-10-CM

## 2021-07-05 DIAGNOSIS — I13.0: Primary | ICD-10-CM

## 2021-07-05 DIAGNOSIS — E86.0: ICD-10-CM

## 2021-07-05 DIAGNOSIS — Z79.02: ICD-10-CM

## 2021-07-05 DIAGNOSIS — I24.8: ICD-10-CM

## 2021-07-05 DIAGNOSIS — E11.22: ICD-10-CM

## 2021-07-05 DIAGNOSIS — I21.4: ICD-10-CM

## 2021-07-05 DIAGNOSIS — I25.2: ICD-10-CM

## 2021-07-05 DIAGNOSIS — H26.9: ICD-10-CM

## 2021-07-05 DIAGNOSIS — E87.0: ICD-10-CM

## 2021-07-05 DIAGNOSIS — Z79.82: ICD-10-CM

## 2021-07-05 LAB
ALBUMIN SERPL-MCNC: 2.6 G/DL (ref 3.5–5)
ALP SERPL-CCNC: 102 U/L (ref 38–126)
ALT SERPL-CCNC: 21 U/L (ref 4–49)
ANION GAP SERPL CALC-SCNC: 7 MMOL/L
APTT BLD: 25.3 SEC (ref 22–30)
AST SERPL-CCNC: 19 U/L (ref 17–59)
BASOPHILS # BLD AUTO: 0 K/UL (ref 0–0.2)
BASOPHILS NFR BLD AUTO: 0 %
BUN SERPL-SCNC: 50 MG/DL (ref 9–20)
CALCIUM SPEC-MCNC: 8.3 MG/DL (ref 8.4–10.2)
CHLORIDE SERPL-SCNC: 116 MMOL/L (ref 98–107)
CO2 SERPL-SCNC: 26 MMOL/L (ref 22–30)
EOSINOPHIL # BLD AUTO: 0 K/UL (ref 0–0.7)
EOSINOPHIL NFR BLD AUTO: 0 %
ERYTHROCYTE [DISTWIDTH] IN BLOOD BY AUTOMATED COUNT: 3.01 M/UL (ref 4.3–5.9)
ERYTHROCYTE [DISTWIDTH] IN BLOOD: 16.3 % (ref 11.5–15.5)
GLUCOSE BLD-MCNC: 129 MG/DL (ref 75–99)
GLUCOSE BLD-MCNC: 202 MG/DL (ref 75–99)
GLUCOSE SERPL-MCNC: 146 MG/DL (ref 74–99)
HCT VFR BLD AUTO: 31.1 % (ref 39–53)
HGB BLD-MCNC: 10 GM/DL (ref 13–17.5)
INR PPP: 1.3 (ref ?–1.2)
LACTATE BLDV-SCNC: 1.8 MMOL/L (ref 0.7–2)
LYMPHOCYTES # SPEC AUTO: 0.7 K/UL (ref 1–4.8)
LYMPHOCYTES NFR SPEC AUTO: 9 %
MAGNESIUM SPEC-SCNC: 2.4 MG/DL (ref 1.6–2.3)
MCH RBC QN AUTO: 33.1 PG (ref 25–35)
MCHC RBC AUTO-ENTMCNC: 32 G/DL (ref 31–37)
MCV RBC AUTO: 103.4 FL (ref 80–100)
MONOCYTES # BLD AUTO: 0.5 K/UL (ref 0–1)
MONOCYTES NFR BLD AUTO: 7 %
NEUTROPHILS # BLD AUTO: 6.2 K/UL (ref 1.3–7.7)
NEUTROPHILS NFR BLD AUTO: 83 %
PH UR: 5 [PH] (ref 5–8)
PLATELET # BLD AUTO: 110 K/UL (ref 150–450)
POTASSIUM SERPL-SCNC: 3.6 MMOL/L (ref 3.5–5.1)
PROT SERPL-MCNC: 5.5 G/DL (ref 6.3–8.2)
PROT UR QL: (no result)
PT BLD: 13.3 SEC (ref 9–12)
SODIUM SERPL-SCNC: 149 MMOL/L (ref 137–145)
SP GR UR: 1.01 (ref 1–1.03)
SQUAMOUS UR QL AUTO: <1 /HPF (ref 0–4)
UROBILINOGEN UR QL STRIP: <2 MG/DL (ref ?–2)
WBC # BLD AUTO: 7.5 K/UL (ref 3.8–10.6)
WBC # UR AUTO: 2 /HPF (ref 0–5)

## 2021-07-05 PROCEDURE — 85610 PROTHROMBIN TIME: CPT

## 2021-07-05 PROCEDURE — 36415 COLL VENOUS BLD VENIPUNCTURE: CPT

## 2021-07-05 PROCEDURE — 96374 THER/PROPH/DIAG INJ IV PUSH: CPT

## 2021-07-05 PROCEDURE — 80053 COMPREHEN METABOLIC PANEL: CPT

## 2021-07-05 PROCEDURE — 71045 X-RAY EXAM CHEST 1 VIEW: CPT

## 2021-07-05 PROCEDURE — 83735 ASSAY OF MAGNESIUM: CPT

## 2021-07-05 PROCEDURE — 83605 ASSAY OF LACTIC ACID: CPT

## 2021-07-05 PROCEDURE — 84484 ASSAY OF TROPONIN QUANT: CPT

## 2021-07-05 PROCEDURE — 84443 ASSAY THYROID STIM HORMONE: CPT

## 2021-07-05 PROCEDURE — 99285 EMERGENCY DEPT VISIT HI MDM: CPT

## 2021-07-05 PROCEDURE — 93005 ELECTROCARDIOGRAM TRACING: CPT

## 2021-07-05 PROCEDURE — 85730 THROMBOPLASTIN TIME PARTIAL: CPT

## 2021-07-05 PROCEDURE — 85025 COMPLETE CBC W/AUTO DIFF WBC: CPT

## 2021-07-05 PROCEDURE — 80048 BASIC METABOLIC PNL TOTAL CA: CPT

## 2021-07-05 PROCEDURE — 81001 URINALYSIS AUTO W/SCOPE: CPT

## 2021-07-05 PROCEDURE — 82140 ASSAY OF AMMONIA: CPT

## 2021-07-05 RX ADMIN — CEFAZOLIN SCH MLS/HR: 330 INJECTION, POWDER, FOR SOLUTION INTRAMUSCULAR; INTRAVENOUS at 12:04

## 2021-07-05 RX ADMIN — TIMOLOL MALEATE SCH DROPS: 5 SOLUTION OPHTHALMIC at 21:15

## 2021-07-05 RX ADMIN — INSULIN DETEMIR SCH: 100 INJECTION, SOLUTION SUBCUTANEOUS at 21:15

## 2021-07-05 RX ADMIN — INSULIN ASPART SCH UNIT: 100 INJECTION, SOLUTION INTRAVENOUS; SUBCUTANEOUS at 21:26

## 2021-07-05 RX ADMIN — BRIMONIDINE TARTRATE SCH DROPS: 2 SOLUTION/ DROPS OPHTHALMIC at 21:15

## 2021-07-05 RX ADMIN — DEXTROSE MONOHYDRATE SCH MLS/HR: 100 INJECTION, SOLUTION INTRAVENOUS at 23:10

## 2021-07-05 RX ADMIN — CEFAZOLIN SCH MLS/HR: 330 INJECTION, POWDER, FOR SOLUTION INTRAMUSCULAR; INTRAVENOUS at 16:50

## 2021-07-05 RX ADMIN — HEPARIN SODIUM SCH UNIT: 5000 INJECTION INTRAVENOUS; SUBCUTANEOUS at 23:40

## 2021-07-05 RX ADMIN — ATORVASTATIN CALCIUM SCH: 80 TABLET, FILM COATED ORAL at 21:14

## 2021-07-05 NOTE — ED
General Adult HPI





- General


Chief complaint: Altered Mental Status


Stated complaint: Weakness


Time Seen by Provider: 07/05/21 09:06


Source: EMS


Mode of arrival: EMS


Limitations: no limitations





- History of Present Illness


Initial comments: 


Dictation was produced using dragon dictation software. please excuse any 

grammatical, word or spelling errors. 











Chief Complaint: 75-year-old male past medical history diabetes, dementia, renal

disease presents to the emergency via for failure to thrive.





History of Present Illness: 75-year-old male recently discharged from the 

hospital presents to the emergency department today for failure to thrive.  

Patient a currently a resident at [facility].  Patient was brought in 

for further thrive.  He hasn't been eating or drinking.  History is limited.  

Patient has history of dementia.  He is found to be a little more lethargic than

usual.  Patient unable to provide history at this time.  It is unclear what 

patient's normal baseline mental status is.  Patient is allegedly full code EMS.








Unable to obtain review of systems second or 2 mental status.








PHYSICAL EXAM:


General Impression: Lethargic, responds to painful stimuli and voice


HEENT: Normocephalic atraumatic, extra-ocular movements intact, pupils equal and

reactive to light bilaterally, dry mucous membranes


Cardiovascular: Heart regular rate and rhythm


Chest: Lungs clear to auscultation bilaterally, no retractions, no tachypnea


Abdomen: abdomen soft, non-tender, non-distended, no organomegaly


Musculoskeletal: Pulses present and equal in all extremities, no peripheral 

edema


Motor:  no focal deficits noted


Neurological: no focal motor or sensory deficits noted


Skin: Intact with no visualized rashes








ED course: 75-year-old male with multiple comorbidities presents to the 

emergency department for failure to thrive.  History received from EMS.  They 

state that patient has not been eating or drinking.  He is seems to be more 

lethargic than usual.  Vital signs upon arrival are within acceptable limits.


Laboratory evaluation obtained.  CBC appears to be a patient's baseline.  Sodium

today is 149.  Likely secondary to dehydration.  Renal markers appear to be 

baseline.  Troponin is slightly above his usual and so 0.129.  Urinalysis 

negative.  Chest x-ray suggest heart failure however/showing signs of respirat

ory distress.  Patient be admitted for failure to thrive, grave disability and 

dehydration.  Case discussed with [facility] hospitalist group.














EKG interpretation: Ventricular rate 72, normal sinus rhythm,.  Interval 90, QRS

72, QTc 523. No KS prolongation, no ST or T-wave changes noted.  EKG compared to

06/26/2021 showing no changes. 








- Related Data


                                Home Medications











 Medication  Instructions  Recorded  Confirmed


 


Aspirin 81 mg PO DAILY@0900 06/10/21 07/05/21


 


Brimonidine Tartrate [Alphagan P 1 drop BOTH EYES BID@0900,2100 06/10/21 

07/05/21





0.2% Ophth Soln]   


 


Ferrous Gluconate 324 mg PO BID@0900,2100 06/10/21 07/05/21


 


Isosorbide Mononitrate ER [Imdur] 15 mg PO DAILY@0900 06/10/21 07/05/21


 


Nephrovite 1 cap PO HS@2100 06/10/21 07/05/21


 


Timolol 0.5% Ophth Soln [Timoptic 1 drop BOTH EYES BID@0900,2100 06/10/21 

07/05/21





0.5% Ophth Soln]   


 


allopurinoL [Zyloprim] 100 mg PO HS@2100 06/10/21 07/05/21


 


calcitrioL [Rocaltrol] 0.25 mcg PO TUFR 06/10/21 07/05/21


 


hydrALAZINE HCL [Apresoline] 10 mg PO BID@0900,2100 06/10/21 07/05/21


 


Clopidogrel [Plavix] 75 mg PO DAILY@0900 06/26/21 07/05/21


 


INSULIN LISPRO (HumaLOG) [humaLOG] See Protocol SQ ACHS@08,12,17,21 06/26/21 07/05/21


 


Nitroglycerin Sl Tabs [Nitrostat] 0.4 mg PO Q5M PRN 06/26/21 07/05/21


 


Tamsulosin [Flomax] 0.4 mg PO DAILY@0900 06/26/21 07/05/21


 


Furosemide [Lasix] 40 mg PO DAILY@0600 07/05/21 07/05/21


 


Glucerna Shake 1 can PO AC-TID 07/05/21 07/05/21


 


carvediloL [Coreg*] 12.5 mg PO BID@0900,2100 07/05/21 07/05/21








                                  Previous Rx's











 Medication  Instructions  Recorded


 


Acetaminophen Tab [Tylenol] 650 mg PO Q6HR PRN  tab 06/14/21


 


Atorvastatin [Lipitor] 80 mg PO HS@2100  tab 06/14/21


 


Insulin Glargine,Hum.rec.anlog 20 unit SQ HS@2100 #0 06/30/21





[Semglee Pen]  











                                    Allergies











Allergy/AdvReac Type Severity Reaction Status Date / Time


 


Fish Containing Products Allergy  Swelling Verified 07/05/21 09:42





[Fish]     


 


Sulfa (Sulfonamide Allergy  Unknown Verified 07/05/21 09:42





Antibiotics)   Childhood  














Review of Systems


ROS Statement: 


Those systems with pertinent positive or pertinent negative responses have been 

documented in the HPI.





ROS Other: All systems not noted in ROS Statement are negative.





Past Medical History


Past Medical History: Cancer, Diabetes Mellitus, Hyperlipidemia, Hypertension, 

Prostate Disorder, Renal Disease


Additional Past Medical History / Comment(s): Prostate cancer with surgery in 

2008, Patient states he has issues with his kidney's and has been seeing doctor 

Devendra since December 2018.


History of Any Multi-Drug Resistant Organisms: None Reported


Past Surgical History: Orthopedic Surgery, Prostate Surgery


Additional Past Surgical History / Comment(s): RIGHT ANKLE 2010, Bilateral 

Cataract 2017, Colonoscopy December 2018, Prostate surgery 2008


Past Anesthesia/Blood Transfusion Reactions: No Reported Reaction


Past Psychological History: No Psychological Hx Reported


Smoking Status: Never smoker


Past Alcohol Use History: Rare


Past Drug Use History: None Reported





- Past Family History


  ** Mother


Family Medical History: No Reported History


Additional Family Medical History / Comment(s): CHF





  ** Father


Family Medical History: No Reported History


Additional Family Medical History / Comment(s): Patient states father passed 

away from a massive heart attack.





General Exam


Limitations: no limitations





Course


                                   Vital Signs











  07/05/21 07/05/21 07/05/21





  08:54 10:25 11:24


 


Temperature 97.9 F  


 


Pulse Rate 74 69 


 


Respiratory 18 17 20





Rate   


 


Blood Pressure 136/84 148/81 


 


O2 Sat by Pulse 100 100 





Oximetry   














  07/05/21





  11:40


 


Temperature 


 


Pulse Rate 78


 


Respiratory 17





Rate 


 


Blood Pressure 150/95


 


O2 Sat by Pulse 99





Oximetry 














Medical Decision Making





- Lab Data


Result diagrams: 


                                 07/05/21 09:22





                                 07/05/21 09:22


                                   Lab Results











  07/05/21 07/05/21 07/05/21 Range/Units





  09:22 09:22 09:22 


 


WBC  7.5    (3.8-10.6)  k/uL


 


RBC  3.01 L    (4.30-5.90)  m/uL


 


Hgb  10.0 L    (13.0-17.5)  gm/dL


 


Hct  31.1 L    (39.0-53.0)  %


 


MCV  103.4 H    (80.0-100.0)  fL


 


MCH  33.1    (25.0-35.0)  pg


 


MCHC  32.0    (31.0-37.0)  g/dL


 


RDW  16.3 H    (11.5-15.5)  %


 


Plt Count  110 L    (150-450)  k/uL


 


MPV  10.4    


 


Neutrophils %  83    %


 


Lymphocytes %  9    %


 


Monocytes %  7    %


 


Eosinophils %  0    %


 


Basophils %  0    %


 


Neutrophils #  6.2    (1.3-7.7)  k/uL


 


Lymphocytes #  0.7 L    (1.0-4.8)  k/uL


 


Monocytes #  0.5    (0-1.0)  k/uL


 


Eosinophils #  0.0    (0-0.7)  k/uL


 


Basophils #  0.0    (0-0.2)  k/uL


 


Hypochromasia  Marked    


 


Poikilocytosis  Moderate    


 


Anisocytosis  Slight    


 


Macrocytosis  Moderate    


 


PT   13.3 H   (9.0-12.0)  sec


 


INR   1.3 H   (<1.2)  


 


APTT   25.3   (22.0-30.0)  sec


 


Sodium    149 H  (137-145)  mmol/L


 


Potassium    3.6  (3.5-5.1)  mmol/L


 


Chloride    116 H  ()  mmol/L


 


Carbon Dioxide    26  (22-30)  mmol/L


 


Anion Gap    7  mmol/L


 


BUN    50 H  (9-20)  mg/dL


 


Creatinine    2.06 H  (0.66-1.25)  mg/dL


 


Est GFR (CKD-EPI)AfAm    36  (>60 ml/min/1.73 sqM)  


 


Est GFR (CKD-EPI)NonAf    31  (>60 ml/min/1.73 sqM)  


 


Glucose    146 H  (74-99)  mg/dL


 


Plasma Lactic Acid Zane     (0.7-2.0)  mmol/L


 


Calcium    8.3 L  (8.4-10.2)  mg/dL


 


Magnesium    2.4 H  (1.6-2.3)  mg/dL


 


Total Bilirubin    0.7  (0.2-1.3)  mg/dL


 


AST    19  (17-59)  U/L


 


ALT    21  (4-49)  U/L


 


Alkaline Phosphatase    102  ()  U/L


 


Ammonia     (<30)  umol/L


 


Troponin I     (0.000-0.034)  ng/mL


 


Total Protein    5.5 L  (6.3-8.2)  g/dL


 


Albumin    2.6 L  (3.5-5.0)  g/dL


 


TSH    2.050  (0.465-4.680)  mIU/L


 


Urine Color     


 


Urine Appearance     (Clear)  


 


Urine pH     (5.0-8.0)  


 


Ur Specific Gravity     (1.001-1.035)  


 


Urine Protein     (Negative)  


 


Urine Glucose (UA)     (Negative)  


 


Urine Ketones     (Negative)  


 


Urine Blood     (Negative)  


 


Urine Nitrite     (Negative)  


 


Urine Bilirubin     (Negative)  


 


Urine Urobilinogen     (<2.0)  mg/dL


 


Ur Leukocyte Esterase     (Negative)  


 


Urine WBC     (0-5)  /hpf


 


Ur Squamous Epith Cells     (0-4)  /hpf


 


Amorphous Sediment     (None)  /hpf














  07/05/21 07/05/21 07/05/21 Range/Units





  09:22 09:22 10:25 


 


WBC     (3.8-10.6)  k/uL


 


RBC     (4.30-5.90)  m/uL


 


Hgb     (13.0-17.5)  gm/dL


 


Hct     (39.0-53.0)  %


 


MCV     (80.0-100.0)  fL


 


MCH     (25.0-35.0)  pg


 


MCHC     (31.0-37.0)  g/dL


 


RDW     (11.5-15.5)  %


 


Plt Count     (150-450)  k/uL


 


MPV     


 


Neutrophils %     %


 


Lymphocytes %     %


 


Monocytes %     %


 


Eosinophils %     %


 


Basophils %     %


 


Neutrophils #     (1.3-7.7)  k/uL


 


Lymphocytes #     (1.0-4.8)  k/uL


 


Monocytes #     (0-1.0)  k/uL


 


Eosinophils #     (0-0.7)  k/uL


 


Basophils #     (0-0.2)  k/uL


 


Hypochromasia     


 


Poikilocytosis     


 


Anisocytosis     


 


Macrocytosis     


 


PT     (9.0-12.0)  sec


 


INR     (<1.2)  


 


APTT     (22.0-30.0)  sec


 


Sodium     (137-145)  mmol/L


 


Potassium     (3.5-5.1)  mmol/L


 


Chloride     ()  mmol/L


 


Carbon Dioxide     (22-30)  mmol/L


 


Anion Gap     mmol/L


 


BUN     (9-20)  mg/dL


 


Creatinine     (0.66-1.25)  mg/dL


 


Est GFR (CKD-EPI)AfAm     (>60 ml/min/1.73 sqM)  


 


Est GFR (CKD-EPI)NonAf     (>60 ml/min/1.73 sqM)  


 


Glucose     (74-99)  mg/dL


 


Plasma Lactic Acid Zane  1.8    (0.7-2.0)  mmol/L


 


Calcium     (8.4-10.2)  mg/dL


 


Magnesium     (1.6-2.3)  mg/dL


 


Total Bilirubin     (0.2-1.3)  mg/dL


 


AST     (17-59)  U/L


 


ALT     (4-49)  U/L


 


Alkaline Phosphatase     ()  U/L


 


Ammonia  <9    (<30)  umol/L


 


Troponin I   0.129 H*   (0.000-0.034)  ng/mL


 


Total Protein     (6.3-8.2)  g/dL


 


Albumin     (3.5-5.0)  g/dL


 


TSH     (0.465-4.680)  mIU/L


 


Urine Color    Yellow  


 


Urine Appearance    Cloudy  (Clear)  


 


Urine pH    5.0  (5.0-8.0)  


 


Ur Specific Gravity    1.014  (1.001-1.035)  


 


Urine Protein    1+ H  (Negative)  


 


Urine Glucose (UA)    Negative  (Negative)  


 


Urine Ketones    Negative  (Negative)  


 


Urine Blood    Negative  (Negative)  


 


Urine Nitrite    Negative  (Negative)  


 


Urine Bilirubin    Negative  (Negative)  


 


Urine Urobilinogen    <2.0  (<2.0)  mg/dL


 


Ur Leukocyte Esterase    Negative  (Negative)  


 


Urine WBC    2  (0-5)  /hpf


 


Ur Squamous Epith Cells    <1  (0-4)  /hpf


 


Amorphous Sediment    Occasional H  (None)  /hpf














Disposition


Clinical Impression: 


 Failure to thrive, Dehydration





Disposition: ADMITTED AS IP TO THIS HOSP


Condition: Fair


Referrals: 


Luna Patricia MD [Primary Care Provider] - 1-2 days

## 2021-07-05 NOTE — XR
EXAMINATION TYPE: XR chest 1V portable

 

DATE OF EXAM: 7/5/2021

 

COMPARISON: 6/20/2021

 

HISTORY: Failure to thrive weakness

 

TECHNIQUE: Single frontal view of the chest is obtained.

 

FINDINGS:  Low lung volumes. Heart size is enlarged. Patchy perihilar and interstitial airspace opaci
ties are most suggestive of edema. Underlying bibasilar infection is not excluded. Small bilateral pl
eural effusions, left greater than right. No pneumothorax.

 

IMPRESSION:  

1. Findings are suggestive of congestive heart failure. Underlying infectious/inflammatory process is
 not excluded. Findings have increased since prior exam.

## 2021-07-06 LAB
ANION GAP SERPL CALC-SCNC: 8 MMOL/L
BASOPHILS # BLD AUTO: 0 K/UL (ref 0–0.2)
BASOPHILS NFR BLD AUTO: 0 %
BUN SERPL-SCNC: 49 MG/DL (ref 9–20)
CALCIUM SPEC-MCNC: 8 MG/DL (ref 8.4–10.2)
CHLORIDE SERPL-SCNC: 115 MMOL/L (ref 98–107)
CO2 SERPL-SCNC: 23 MMOL/L (ref 22–30)
EOSINOPHIL # BLD AUTO: 0.1 K/UL (ref 0–0.7)
EOSINOPHIL NFR BLD AUTO: 1 %
ERYTHROCYTE [DISTWIDTH] IN BLOOD BY AUTOMATED COUNT: 3.06 M/UL (ref 4.3–5.9)
ERYTHROCYTE [DISTWIDTH] IN BLOOD: 16.3 % (ref 11.5–15.5)
GLUCOSE BLD-MCNC: 145 MG/DL (ref 75–99)
GLUCOSE BLD-MCNC: 200 MG/DL (ref 75–99)
GLUCOSE BLD-MCNC: 210 MG/DL (ref 75–99)
GLUCOSE BLD-MCNC: 249 MG/DL (ref 75–99)
GLUCOSE SERPL-MCNC: 230 MG/DL (ref 74–99)
HCT VFR BLD AUTO: 32.3 % (ref 39–53)
HGB BLD-MCNC: 10.3 GM/DL (ref 13–17.5)
LYMPHOCYTES # SPEC AUTO: 0.7 K/UL (ref 1–4.8)
LYMPHOCYTES NFR SPEC AUTO: 9 %
MCH RBC QN AUTO: 33.8 PG (ref 25–35)
MCHC RBC AUTO-ENTMCNC: 32 G/DL (ref 31–37)
MCV RBC AUTO: 105.7 FL (ref 80–100)
MONOCYTES # BLD AUTO: 0.5 K/UL (ref 0–1)
MONOCYTES NFR BLD AUTO: 7 %
NEUTROPHILS # BLD AUTO: 6.2 K/UL (ref 1.3–7.7)
NEUTROPHILS NFR BLD AUTO: 81 %
PLATELET # BLD AUTO: 119 K/UL (ref 150–450)
POTASSIUM SERPL-SCNC: 3.8 MMOL/L (ref 3.5–5.1)
SODIUM SERPL-SCNC: 146 MMOL/L (ref 137–145)
WBC # BLD AUTO: 7.6 K/UL (ref 3.8–10.6)

## 2021-07-06 RX ADMIN — INSULIN ASPART SCH: 100 INJECTION, SOLUTION INTRAVENOUS; SUBCUTANEOUS at 22:32

## 2021-07-06 RX ADMIN — BRIMONIDINE TARTRATE SCH DROPS: 2 SOLUTION/ DROPS OPHTHALMIC at 08:45

## 2021-07-06 RX ADMIN — INSULIN ASPART SCH UNIT: 100 INJECTION, SOLUTION INTRAVENOUS; SUBCUTANEOUS at 17:48

## 2021-07-06 RX ADMIN — INSULIN ASPART SCH UNIT: 100 INJECTION, SOLUTION INTRAVENOUS; SUBCUTANEOUS at 12:27

## 2021-07-06 RX ADMIN — CLOPIDOGREL BISULFATE SCH MG: 75 TABLET ORAL at 08:44

## 2021-07-06 RX ADMIN — TAMSULOSIN HYDROCHLORIDE SCH MG: 0.4 CAPSULE ORAL at 08:45

## 2021-07-06 RX ADMIN — INSULIN ASPART SCH UNIT: 100 INJECTION, SOLUTION INTRAVENOUS; SUBCUTANEOUS at 06:37

## 2021-07-06 RX ADMIN — MORPHINE SULFATE PRN MG: 2 INJECTION, SOLUTION INTRAMUSCULAR; INTRAVENOUS at 23:30

## 2021-07-06 RX ADMIN — HEPARIN SODIUM SCH UNIT: 5000 INJECTION INTRAVENOUS; SUBCUTANEOUS at 08:44

## 2021-07-06 RX ADMIN — ASPIRIN 81 MG CHEWABLE TABLET SCH MG: 81 TABLET CHEWABLE at 08:44

## 2021-07-06 RX ADMIN — INSULIN DETEMIR SCH: 100 INJECTION, SOLUTION SUBCUTANEOUS at 20:49

## 2021-07-06 RX ADMIN — DEXTROSE MONOHYDRATE SCH MLS/HR: 100 INJECTION, SOLUTION INTRAVENOUS at 15:33

## 2021-07-06 RX ADMIN — TIMOLOL MALEATE SCH: 5 SOLUTION OPHTHALMIC at 20:50

## 2021-07-06 RX ADMIN — HEPARIN SODIUM SCH UNIT: 5000 INJECTION INTRAVENOUS; SUBCUTANEOUS at 17:48

## 2021-07-06 RX ADMIN — TIMOLOL MALEATE SCH DROPS: 5 SOLUTION OPHTHALMIC at 08:45

## 2021-07-06 RX ADMIN — ATORVASTATIN CALCIUM SCH: 80 TABLET, FILM COATED ORAL at 22:32

## 2021-07-06 RX ADMIN — ISOSORBIDE MONONITRATE SCH MG: 30 TABLET, EXTENDED RELEASE ORAL at 08:45

## 2021-07-06 RX ADMIN — MORPHINE SULFATE PRN MG: 2 INJECTION, SOLUTION INTRAMUSCULAR; INTRAVENOUS at 15:23

## 2021-07-06 RX ADMIN — BRIMONIDINE TARTRATE SCH: 2 SOLUTION/ DROPS OPHTHALMIC at 22:32

## 2021-07-06 NOTE — XR
EXAMINATION TYPE: XR chest 1V

 

DATE OF EXAM: 7/6/2021

 

COMPARISON: 7/5/2021

 

HISTORY: Congestive heart failure

 

TECHNIQUE: Single frontal view of the chest is obtained.

 

FINDINGS:  Heart size is enlarged. Small left and tiny right pleural effusions. Perihilar and interst
itial airspace opacities are suggestive of edema. There are patchy bibasilar infrahilar airspace opac
ities. No pneumothorax. Atherosclerotic aorta. Overlying leads.

 

IMPRESSION:  

1. Stable findings suggestive of congestive heart failure.

2. Mild bibasilar infrahilar airspace opacities. These may represent atelectasis or developing pneumo
henrik. Findings are stable.

## 2021-07-06 NOTE — P.PN
Subjective


Progress Note Date: 07/06/21


This is a 77-year-old gentleman who is a recent NSTEMI who chose conservative 

management and was discharged to subacute rehab.Post MI, patient  returned and 

discharged from the hospital on 6/26 to return to subacute rehab. with failure 

to thrive, severe protein calorie malnutrition, acute on chronic renal failure, 

acute on chronic systolic CHF and multiple other medical issues.  Dr. Phan had 

in depth discussion with wife who wished to pursue palliative care and hospice 

as needed.  Patient has had minimal to no oral intake in many weeks.  Continues 

to have zero intake.  Patient is grayish in color ,currently having multiple 

periods of apnea lasting greater than 15 seconds, Cheyne vilchis, restless, 

panting, pausing, restlessness, knees cold, not yet mottled and is appropriate 

for [facility] hospice.  Hospice consulted.








Objective





- Vital Signs


Vital signs: 


                                   Vital Signs











Temp  97.5 F L  07/06/21 15:18


 


Pulse  78   07/06/21 15:18


 


Resp  30 H  07/06/21 15:18


 


BP  123/83   07/06/21 15:18


 


Pulse Ox  100   07/06/21 15:18








                                 Intake & Output











 07/05/21 07/06/21 07/06/21





 18:59 06:59 18:59


 


Intake Total  400 


 


Balance  400 


 


Weight 60 kg 58.5 kg 


 


Intake:   


 


  Intake, IV Titration  340 





  Amount   


 


    Dextrose 10% in Water 500  240 





    ml In Empty Bag 1 bag @   





    40 mls/hr IV .[id number] PENG   





    Rx#:[id number]   


 


    Sodium Chloride 0.9% 1,  100 





    000 ml @ 50 mls/hr IV .   





    Q20H PENG Rx#:[id number]   


 


  Oral  60 


 


Other:   


 


  # Voids  3 2














- Exam





- Exam





PHYSICAL EXAM:


VITAL SIGNS: As above


GENERAL: A & O X 1, lethargic, restless, fidgety


HEENT:  Conjunctivae normal. eyes normal.  Oral mucosa dry


NECK:  Supple, No JVD. 


CARDIOVASCULAR:  S1, S2 regular.  Systolic murmur


RESPIRATION: Normal, labored , Cheyne Vilchis.  Apnea periods greater than 15 

seconds 


ABDOMEN:  Soft, nondistended, nontender . No guarding. no masses palpable. Bowel

sounds hypoactive


LEGS:  No edema. no swelling.  No calf tenderness. 


NERVOUS SYSTEM: Unable to fully assess, given patient's current condition.  

Generalized weakness.


Skin: Gray, cool to cold, no mottling











- Labs


CBC & Chem 7: 


                                 07/06/21 06:31





                                 07/06/21 06:31


Labs: 


                  Abnormal Lab Results - Last 24 Hours (Table)











  07/05/21 07/05/21 07/06/21 Range/Units





  16:44 20:13 06:15 


 


RBC     (4.30-5.90)  m/uL


 


Hgb     (13.0-17.5)  gm/dL


 


Hct     (39.0-53.0)  %


 


MCV     (80.0-100.0)  fL


 


RDW     (11.5-15.5)  %


 


Plt Count     (150-450)  k/uL


 


Lymphocytes #     (1.0-4.8)  k/uL


 


Sodium     (137-145)  mmol/L


 


Chloride     ()  mmol/L


 


BUN     (9-20)  mg/dL


 


Creatinine     (0.66-1.25)  mg/dL


 


Glucose     (74-99)  mg/dL


 


POC Glucose (mg/dL)  129 H  202 H  249 H  (75-99)  mg/dL


 


Calcium     (8.4-10.2)  mg/dL














  07/06/21 07/06/21 07/06/21 Range/Units





  06:31 06:31 11:47 


 


RBC  3.06 L    (4.30-5.90)  m/uL


 


Hgb  10.3 L    (13.0-17.5)  gm/dL


 


Hct  32.3 L    (39.0-53.0)  %


 


MCV  105.7 H    (80.0-100.0)  fL


 


RDW  16.3 H    (11.5-15.5)  %


 


Plt Count  119 L    (150-450)  k/uL


 


Lymphocytes #  0.7 L    (1.0-4.8)  k/uL


 


Sodium   146 H   (137-145)  mmol/L


 


Chloride   115 H   ()  mmol/L


 


BUN   49 H   (9-20)  mg/dL


 


Creatinine   1.92 H   (0.66-1.25)  mg/dL


 


Glucose   230 H   (74-99)  mg/dL


 


POC Glucose (mg/dL)    210 H  (75-99)  mg/dL


 


Calcium   8.0 L   (8.4-10.2)  mg/dL














Assessment and Plan


Assessment: 


Severe protein calorie malnutrition, BMI 22.4 


Failure to thrive, multifactorial, including the above and #1


Hypernatremia secondary to dehydration, volume depletion


Anemia of chronic kidney disease


Thrombocytopenia, evaluated by oncology/hematology last visit, bone marrow 

biopsy pending OP.


Chronic systolic CHF


Chronic renal failure stage IV


Elevated troponins secondary to recent NSTEMI, conservative management


Diabetes mellitus, hyperglycemic, long-acting insulin increased


Ischemic cardiomyopathy, EF less than 20%


Moderate pulmonary hypertension


Multivalvular disease


Depression





Plan: Continue on current medication regime ,monitoring and symptomatic 

treatment.  Patient is appropriate for comfort care/ hospice.  Hospice consulted

for [facility] hospice.  Further recommendations to follow.  Prognosis poor given 

multiple complex medical issues.











The impression and plan of care has been dictated as directed.





:


I performed a history and examination of this patient,  discussed the same with 

the dictator.  I agree with the dictator's note ,documented as a scribe.  Any 

additional findings or plans will be noted.

## 2021-07-07 ENCOUNTER — HOSPITAL ENCOUNTER (INPATIENT)
Dept: HOSPITAL 47 - 3SCARD | Age: 76
LOS: 1 days | DRG: 951 | End: 2021-07-08
Attending: FAMILY MEDICINE | Admitting: FAMILY MEDICINE
Payer: MEDICAID

## 2021-07-07 VITALS — SYSTOLIC BLOOD PRESSURE: 146 MMHG | HEART RATE: 72 BPM | DIASTOLIC BLOOD PRESSURE: 89 MMHG | TEMPERATURE: 97.6 F

## 2021-07-07 VITALS — HEART RATE: 73 BPM

## 2021-07-07 VITALS — RESPIRATION RATE: 16 BRPM

## 2021-07-07 DIAGNOSIS — I27.22: ICD-10-CM

## 2021-07-07 DIAGNOSIS — I25.2: ICD-10-CM

## 2021-07-07 DIAGNOSIS — I25.5: ICD-10-CM

## 2021-07-07 DIAGNOSIS — N18.4: ICD-10-CM

## 2021-07-07 DIAGNOSIS — E43: ICD-10-CM

## 2021-07-07 DIAGNOSIS — I27.20: ICD-10-CM

## 2021-07-07 DIAGNOSIS — I13.0: ICD-10-CM

## 2021-07-07 DIAGNOSIS — E86.0: ICD-10-CM

## 2021-07-07 DIAGNOSIS — I50.22: ICD-10-CM

## 2021-07-07 DIAGNOSIS — D63.1: ICD-10-CM

## 2021-07-07 DIAGNOSIS — F32.9: ICD-10-CM

## 2021-07-07 DIAGNOSIS — Z51.5: Primary | ICD-10-CM

## 2021-07-07 DIAGNOSIS — E87.0: ICD-10-CM

## 2021-07-07 DIAGNOSIS — E86.9: ICD-10-CM

## 2021-07-07 DIAGNOSIS — R62.7: ICD-10-CM

## 2021-07-07 DIAGNOSIS — E11.22: ICD-10-CM

## 2021-07-07 LAB
GLUCOSE BLD-MCNC: 183 MG/DL (ref 75–99)
GLUCOSE BLD-MCNC: 238 MG/DL (ref 75–99)
GLUCOSE BLD-MCNC: 300 MG/DL (ref 75–99)

## 2021-07-07 RX ADMIN — TIMOLOL MALEATE SCH DROPS: 5 SOLUTION OPHTHALMIC at 08:45

## 2021-07-07 RX ADMIN — INSULIN ASPART SCH: 100 INJECTION, SOLUTION INTRAVENOUS; SUBCUTANEOUS at 06:28

## 2021-07-07 RX ADMIN — DEXTROSE MONOHYDRATE SCH: 100 INJECTION, SOLUTION INTRAVENOUS at 06:20

## 2021-07-07 RX ADMIN — ISOSORBIDE MONONITRATE SCH MG: 30 TABLET, EXTENDED RELEASE ORAL at 08:46

## 2021-07-07 RX ADMIN — MORPHINE SULFATE PRN MG: 2 INJECTION, SOLUTION INTRAMUSCULAR; INTRAVENOUS at 12:01

## 2021-07-07 RX ADMIN — HEPARIN SODIUM SCH UNIT: 5000 INJECTION INTRAVENOUS; SUBCUTANEOUS at 08:46

## 2021-07-07 RX ADMIN — INSULIN ASPART SCH UNIT: 100 INJECTION, SOLUTION INTRAVENOUS; SUBCUTANEOUS at 12:03

## 2021-07-07 RX ADMIN — CLOPIDOGREL BISULFATE SCH MG: 75 TABLET ORAL at 08:46

## 2021-07-07 RX ADMIN — TAMSULOSIN HYDROCHLORIDE SCH MG: 0.4 CAPSULE ORAL at 08:46

## 2021-07-07 RX ADMIN — ASPIRIN 81 MG CHEWABLE TABLET SCH MG: 81 TABLET CHEWABLE at 08:46

## 2021-07-07 RX ADMIN — BRIMONIDINE TARTRATE SCH DROPS: 2 SOLUTION/ DROPS OPHTHALMIC at 08:45

## 2021-07-07 RX ADMIN — DEXTROSE MONOHYDRATE SCH: 100 INJECTION, SOLUTION INTRAVENOUS at 12:04

## 2021-07-07 RX ADMIN — HEPARIN SODIUM SCH UNIT: 5000 INJECTION INTRAVENOUS; SUBCUTANEOUS at 00:21

## 2021-07-07 NOTE — P.DS
Providers


Date of admission: 


07/05/21 12:09





Expected date of discharge: 07/07/21


Attending physician: 


Saroj Phan





Primary care physician: 


Saroj Phan





Hospital Course: 


Final Diagnoses:


Severe protein calorie malnutrition, BMI 22.4 


Failure to thrive, multifactorial, including the above and #1


Hypernatremia secondary to dehydration, volume depletion


Anemia of chronic kidney disease


Thrombocytopenia, evaluated by oncology/hematology last visit, bone marrow 

biopsy pending OP.


Chronic systolic CHF


Chronic renal failure stage IV


Elevated troponins secondary to recent NSTEMI, conservative management


Diabetes mellitus, hyperglycemic, long-acting insulin increased


Ischemic cardiomyopathy, EF less than 20%


Moderate pulmonary hypertension


Multivalvular disease


Depression


Code, no CPR, no intubation


Comfort care, hospice,[facility]








Hospital course:This is a 77-year-old gentleman who is a recent NSTEMI who chose

conservative management and was discharged to subacute rehab.Post MI, patient  

returned and discharged from the hospital on 6/26 to return to subacute rehab. 

with failure to thrive, severe protein calorie malnutrition, acute on chronic 

renal failure, acute on chronic systolic CHF and multiple other medical issues. 

Dr. Phan had in depth discussion with wife who wished to pursue palliative care

and hospice as needed.  Patient has had minimal to no oral intake in many weeks.

 Continues to have zero intake.  Patient is grayish in color ,currently having 

multiple periods of apnea lasting greater than 15 seconds, Cheyne leonardo, 

restless, panting, pausing, restlessness, knees cold, not yet mottled and is 

appropriate for [facility] hospice.  Hospice consulted.





Patient continued to decline overnight, now meets criteria for [facility] hospice.  

Patient will be discharged to open up to [facility] hospice, pending wife's 

arrival/signature.  Prognosis poor given multiple complex medical issues.








The impression and plan of care has been dictated as directed.





:


I performed a history and examination of this patient,  discussed the same with 

the dictator.  I agree with the dictator's note ,documented as a scribe.  Any 

additional findings or plans will be noted.


Patient Condition at Discharge: Stable





Plan - Discharge Summary


Discharge Rx Participant: No


New Discharge Prescriptions: 


No Action


   calcitrioL [Rocaltrol] 0.25 mcg PO TUFR


   Brimonidine Tartrate [Alphagan P 0.2% Ophth Soln] 1 drop BOTH EYES 

BID@0900,2100


   allopurinoL [Zyloprim] 100 mg PO HS@2100


   Aspirin 81 mg PO DAILY@0900


   Acetaminophen Tab [Tylenol] 650 mg PO Q6HR PRN  tab


     PRN Reason: Mild Pain Or Fever > 100.5


   INSULIN LISPRO (HumaLOG) [humaLOG] See Protocol SQ ACHS@08,12,17,21


   Tamsulosin [Flomax] 0.4 mg PO DAILY@0900


   Clopidogrel [Plavix] 75 mg PO DAILY@0900


   Nitroglycerin Sl Tabs [Nitrostat] 0.4 mg PO Q5M PRN


     PRN Reason: Chest Pain


   Glucerna Shake 1 can PO AC-TID


   Timolol 0.5% Ophth Soln [Timoptic 0.5% Ophth Soln] 1 drop BOTH EYES 

BID@0900,2100


   hydrALAZINE HCL [Apresoline] 10 mg PO BID@0900,2100


   Isosorbide Mononitrate ER [Imdur] 15 mg PO DAILY@0900


   Ferrous Gluconate 324 mg PO BID@0900,2100


   Nephrovite 1 cap PO HS@2100


   Atorvastatin [Lipitor] 80 mg PO HS@2100  tab


   Insulin Glargine,Hum.rec.anlog [Semglee Pen] 20 unit SQ HS@2100 #0


   Furosemide [Lasix] 40 mg PO DAILY@0600


   carvediloL [Coreg*] 12.5 mg PO BID@0900,2100


Discharge Medication List





Aspirin 81 mg PO DAILY@0900 06/10/21 [History]


Brimonidine Tartrate [Alphagan P 0.2% Ophth Soln] 1 drop BOTH EYES BID@0900,2100

06/10/21 [History]


Ferrous Gluconate 324 mg PO BID@0900,2100 06/10/21 [History]


Isosorbide Mononitrate ER [Imdur] 15 mg PO DAILY@0900 06/10/21 [History]


Nephrovite 1 cap PO HS@2100 06/10/21 [History]


Timolol 0.5% Ophth Soln [Timoptic 0.5% Ophth Soln] 1 drop BOTH EYES 

BID@0900,2100 06/10/21 [History]


allopurinoL [Zyloprim] 100 mg PO HS@2100 06/10/21 [History]


calcitrioL [Rocaltrol] 0.25 mcg PO TUFR 06/10/21 [History]


hydrALAZINE HCL [Apresoline] 10 mg PO BID@0900,2100 06/10/21 [History]


Acetaminophen Tab [Tylenol] 650 mg PO Q6HR PRN  tab 06/14/21 [Rx]


Atorvastatin [Lipitor] 80 mg PO HS@2100  tab 06/14/21 [Rx]


Clopidogrel [Plavix] 75 mg PO DAILY@0900 06/26/21 [History]


INSULIN LISPRO (HumaLOG) [humaLOG] See Protocol SQ ACHS@08,12,17,21 06/26/21 

[History]


Nitroglycerin Sl Tabs [Nitrostat] 0.4 mg PO Q5M PRN 06/26/21 [History]


Tamsulosin [Flomax] 0.4 mg PO DAILY@0900 06/26/21 [History]


Insulin Glargine,Hum.rec.anlog [Semglee Pen] 20 unit SQ HS@2100 #0 06/30/21 [Rx]


Furosemide [Lasix] 40 mg PO DAILY@0600 07/05/21 [History]


Glucerna Shake 1 can PO AC-TID 07/05/21 [History]


carvediloL [Coreg*] 12.5 mg PO BID@0900,2100 07/05/21 [History]








Follow up Appointment(s)/Referral(s): 


Luna Patricia MD [STAFF PHYSICIAN] - 1-2 days


Discharge Disposition: DISCH TO HOSPICE [facility]

## 2021-07-08 VITALS — RESPIRATION RATE: 17 BRPM

## 2021-07-08 NOTE — P.DS
Providers


Date of admission: 


21 12:52





Expected date of discharge: 21


Attending physician: 


Saroj Phan





Primary care physician: 


Saroj Phan





Hospital Course: 











**Covering for Dr. Phan**





Final diagnosis





Severe protein calorie malnutrition, BMI 22.4 


Failure to thrive, multifactorial, including the above and #1


Hypernatremia secondary to dehydration, volume depletion


Anemia of chronic kidney disease


Thrombocytopenia, evaluated by oncology/hematology last visit, bone marrow 

biopsy pending OP.


Chronic systolic CHF


Chronic renal failure stage IV


Elevated troponins secondary to recent NSTEMI, conservative management


Diabetes mellitus, hyperglycemic, long-acting insulin increased


Ischemic cardiomyopathy, EF less than 20%


Moderate pulmonary hypertension


Multivalvular disease


Depression


Code, no CPR, no intubation


Comfort care, hospice,[facility]





Discharge disposition


Patient has .  Please refer to nursing documentation an accurate time of 

death.








Hospital course





This is a 77-year-old gentleman who is a recent NSTEMI who chose conservative 

management and was discharged to subacute rehab.Post MI, patient  returned and 

discharged from the hospital on  to return to subacute rehab. with failure 

to thrive, severe protein calorie malnutrition, acute on chronic renal failure, 

acute on chronic systolic CHF and multiple other medical issues.  Dr. Phan had 

in depth discussion with wife who wished to pursue palliative care and hospice 

as needed.  Patient has had minimal to no oral intake in many weeks.  Continues 

to have zero intake.  Patient is grayish in color ,currently having multiple 

periods of apnea lasting greater than 15 seconds, Cheyne leonardo, restless, 

panting, pausing, restlessness, knees cold, not yet mottled and is appropriate 

for [facility] hospice.  Hospice consulted.  Patient was signed on to [facility] 

and made GIP with comfort measures only and family at the bedside.  Patient's 

prognosis is extremely poor given multiple complex medical issues.  Patient has 

.  Please refer to previous documentation from Dr. Saroj Phan for further

HPI











Patient Condition at Discharge: Poor





Plan - Discharge Summary


New Discharge Prescriptions: 


No Action


   calcitrioL [Rocaltrol] 0.25 mcg PO TUFR


   Brimonidine Tartrate [Alphagan P 0.2% Ophth Soln] 1 drop BOTH EYES 

BID@0900,2100


   allopurinoL [Zyloprim] 100 mg PO HS@2100


   Aspirin 81 mg PO DAILY@0900


   Acetaminophen Tab [Tylenol] 650 mg PO Q6HR PRN  tab


     PRN Reason: Mild Pain Or Fever > 100.5


   INSULIN LISPRO (HumaLOG) [humaLOG] See Protocol SQ ACHS@08,12,,


   Tamsulosin [Flomax] 0.4 mg PO DAILY@0900


   Clopidogrel [Plavix] 75 mg PO DAILY@0900


   Nitroglycerin Sl Tabs [Nitrostat] 0.4 mg PO Q5M PRN


     PRN Reason: Chest Pain


   Glucerna Shake 1 can PO AC-TID


   Timolol 0.5% Ophth Soln [Timoptic 0.5% Ophth Soln] 1 drop BOTH EYES 

BID@900,


   hydrALAZINE HCL [Apresoline] 10 mg PO BID@900,


   Isosorbide Mononitrate ER [Imdur] 15 mg PO DAILY@900


   Ferrous Gluconate 324 mg PO BID@900,


   Nephrovite 1 cap PO HS@


   Atorvastatin [Lipitor] 80 mg PO HS@  tab


   Insulin Glargine,Hum.rec.anlog [Semglee Pen] 20 unit SQ HS@ #0


   Furosemide [Lasix] 40 mg PO DAILY@600


   carvediloL [Coreg*] 12.5 mg PO BID@900,


Discharge Medication List





Aspirin 81 mg PO DAILY@0900 06/10/21 [History]


Brimonidine Tartrate [Alphagan P 0.2% Ophth Soln] 1 drop BOTH EYES BID@00,2100

06/10/21 [History]


Ferrous Gluconate 324 mg PO BID@900,2100 06/10/21 [History]


Isosorbide Mononitrate ER [Imdur] 15 mg PO DAILY@0900 06/10/21 [History]


Nephrovite 1 cap PO HS@2100 06/10/21 [History]


Timolol 0.5% Ophth Soln [Timoptic 0.5% Ophth Soln] 1 drop BOTH EYES 

BID@00,2100 06/10/21 [History]


allopurinoL [Zyloprim] 100 mg PO HS@2100 06/10/21 [History]


calcitrioL [Rocaltrol] 0.25 mcg PO TUFR 06/10/21 [History]


hydrALAZINE HCL [Apresoline] 10 mg PO BID@0900,2100 06/10/21 [History]


Acetaminophen Tab [Tylenol] 650 mg PO Q6HR PRN  tab 21 [Rx]


Atorvastatin [Lipitor] 80 mg PO HS@2100  tab 21 [Rx]


Clopidogrel [Plavix] 75 mg PO DAILY@0900 21 [History]


INSULIN LISPRO (HumaLOG) [humaLOG] See Protocol SQ ACHS@08,12,17,21 21 

[History]


Nitroglycerin Sl Tabs [Nitrostat] 0.4 mg PO Q5M PRN 21 [History]


Tamsulosin [Flomax] 0.4 mg PO DAILY@0921 [History]


Insulin Glargine,Hum.rec.anlog [Semglee Pen] 20 unit SQ HS@2100 #0 21 [Rx]


Furosemide [Lasix] 40 mg PO DAILY@0600 21 [History]


Glucerna Shake 1 can PO AC-TID 21 [History]


carvediloL [Coreg*] 12.5 mg PO BID@0900,21 [History]








Discharge Disposition: 





- Preliminary Cause of Death


Preliminary Cause of Death: Failure to thrive, severe protein calorie 

malnutrition, chronic syst CHF

## 2025-06-08 NOTE — P.HPIM
History of Present Illness


H&P Date: 07/05/21


Chief Complaint: Increased weakness





Patient is a 75-year-old male with a known history of chronic CHF with systolic 

dysfunction ejection fraction 20%, ischemic cardiomyopathy, hypertension, 

hyperlipidemia, BPH, diabetes type 2, chronic kidney disease stage IV and 

multiple other medical problems including prostate cancer with surgery in 2008 

presents to ER due to poor oral intake.  Patient is currently at [facility] nursing

[place].  Patient was brought in the hospital due to failure to thrive.  Patient 

apparently has not been eating or drinking.  Patient is more lethargic than 

usual.  Was sent to ER by EMS.  Patient is a poor historian otherwise.


Patient was recently discharged from the hospital on 6/30/2021 was admitted with

acute on chronic CHF with systolic dysfunction.  Ejection fraction 20% and also 

valvular heart disease.  Patient was diuresed well and was discharged to [facility].  Patient was also found to have elevated troponin level.  Maximal 

medical therapy was recommended.


Patient has been afebrile.  No complaints of chest pain.  No leg swelling.  No 

cough or sputum production.  No nausea vomiting abdominal pain or diarrhea.


Chest x-ray showed findings are suggestive of CHF.  Underlying infectious 

intermittent process is not excluded.


EKG showed normal sinus rhythm.


Laboratory showed WBC 7.5 hemoglobin 10.0 .4 and platelets 110


Sodium 149 potassium 3.6, BUN 15 creatinine 2.06 and blood sugar is 146


Calcium 8.3 magnesium 2.4


Troponin 0 0.129


Albumin 2.6 and TSH 2.05


UA negative for infection.








Review of Systems





Complete review of systems could not be obtained from the patient.,  Except as 

per HPI.





Past Medical History


Past Medical History: Cancer, Diabetes Mellitus, Hyperlipidemia, Hypertension, 

Prostate Disorder, Renal Disease


Additional Past Medical History / Comment(s): Prostate cancer with surgery in 20 08, Patient states he has issues with his kidney's and has been seeing doctor 

Devendra since December 2018. does  not wangt to do dialysis


History of Any Multi-Drug Resistant Organisms: None Reported


Past Surgical History: Orthopedic Surgery, Prostate Surgery


Additional Past Surgical History / Comment(s): RIGHT ANKLE 2010, Bilateral 

Cataract 2017, Colonoscopy December 2018, Prostate surgery 2008


Past Anesthesia/Blood Transfusion Reactions: No Reported Reaction


Past Psychological History: No Psychological Hx Reported


Smoking Status: Never smoker


Past Alcohol Use History: Rare


Past Drug Use History: None Reported





- Past Family History


  ** Mother


Family Medical History: No Reported History


Additional Family Medical History / Comment(s): CHF





  ** Father


Family Medical History: No Reported History


Additional Family Medical History / Comment(s): Patient states father passed 

away from a massive heart attack.





Medications and Allergies


                                Home Medications











 Medication  Instructions  Recorded  Confirmed  Type


 


Aspirin 81 mg PO DAILY@0900 06/10/21 07/05/21 History


 


Brimonidine Tartrate [Alphagan P 1 drop BOTH EYES BID@0900,2100 06/10/21 

07/05/21 History





0.2% Ophth Soln]    


 


Ferrous Gluconate 324 mg PO BID@0900,2100 06/10/21 07/05/21 History


 


Isosorbide Mononitrate ER [Imdur] 15 mg PO DAILY@0900 06/10/21 07/05/21 History


 


Nephrovite 1 cap PO HS@2100 06/10/21 07/05/21 History


 


Timolol 0.5% Ophth Soln [Timoptic 1 drop BOTH EYES BID@0900,2100 06/10/21 

07/05/21 History





0.5% Ophth Soln]    


 


allopurinoL [Zyloprim] 100 mg PO HS@2100 06/10/21 07/05/21 History


 


calcitrioL [Rocaltrol] 0.25 mcg PO TUFR 06/10/21 07/05/21 History


 


hydrALAZINE HCL [Apresoline] 10 mg PO BID@0900,2100 06/10/21 07/05/21 History


 


Acetaminophen Tab [Tylenol] 650 mg PO Q6HR PRN  tab 06/14/21 07/05/21 Rx


 


Atorvastatin [Lipitor] 80 mg PO HS@2100  tab 06/14/21 07/05/21 Rx


 


Clopidogrel [Plavix] 75 mg PO DAILY@0900 06/26/21 07/05/21 History


 


INSULIN LISPRO (HumaLOG) [humaLOG] See Protocol SQ ACHS@08,12,17,21 06/26/21 07/05/21 History


 


Nitroglycerin Sl Tabs [Nitrostat] 0.4 mg PO Q5M PRN 06/26/21 07/05/21 History


 


Tamsulosin [Flomax] 0.4 mg PO DAILY@0900 06/26/21 07/05/21 History


 


Insulin Glargine,Hum.rec.anlog 20 unit SQ HS@2100 #0 06/30/21 07/05/21 Rx





[Semglee Pen]    


 


Furosemide [Lasix] 40 mg PO DAILY@0600 07/05/21 07/05/21 History


 


Glucerna Shake 1 can PO AC-TID 07/05/21 07/05/21 History


 


carvediloL [Coreg*] 12.5 mg PO BID@0900,2100 07/05/21 07/05/21 History








                                    Allergies











Allergy/AdvReac Type Severity Reaction Status Date / Time


 


Fish Containing Products Allergy  Swelling Verified 07/05/21 09:42





[Fish]     


 


Sulfa (Sulfonamide Allergy  Unknown Verified 07/05/21 09:42





Antibiotics)   Childhood  














Physical Exam


Vitals: 


                                   Vital Signs











  Temp Pulse Pulse Resp BP BP Pulse Ox


 


 07/05/21 16:32  98.0 F   99  21   144/94  100


 


 07/05/21 15:03   74   17  136/89   100


 


 07/05/21 13:55   75   17  128/78   93 L


 


 07/05/21 11:40   78   17  150/95   99


 


 07/05/21 11:24     20   


 


 07/05/21 10:25   69   17  148/81   100


 


 07/05/21 08:54  97.9 F  74   18  136/84   100








                                Intake and Output











 07/05/21 07/05/21 07/05/21





 06:59 14:59 22:59


 


Other:   


 


  Weight  120.7 kg 60 kg














PHYSICAL EXAMINATION: 


Patient is lying in the bed comfortably, no acute distress, awake alert , 

Lethargic drowsy.  Slow to respond... 


HEENT: Normocephalic. Neck is supple. Pupils reactive. Nostrils clear. Oral 

cavity is moist. Ears reveal no drainage. 


Neck reveals no JVD, carotid bruits, or thyromegaly. 


CHEST EXAMINATION: Trachea is central. Symmetrical expansion. Lung fields clear 

to auscultation and percussion. 


CARDIAC: Normal S1, S2 with no gallops. + murmur


ABDOMEN: Soft. Bowel sounds normal. No organomegaly. No abdominal bruits. 


Extremities: reveal no edema.  No clubbing or cyanosis


Neurologically awake, alert, oriented x3 .Able to move extremities while in bed.

  Generalized weakness. .  No focal deficits noted


Skin: No rash or skin lesions. 


Psychiatric: Coperative. Could not be assisted completely.


Musculoskeletal: No joint swelling or deformity. 





Results


CBC & Chem 7: 


                                 07/05/21 09:22





                                 07/05/21 09:22


Labs: 


                  Abnormal Lab Results - Last 24 Hours (Table)











  07/05/21 07/05/21 07/05/21 Range/Units





  09:22 09:22 09:22 


 


RBC  3.01 L    (4.30-5.90)  m/uL


 


Hgb  10.0 L    (13.0-17.5)  gm/dL


 


Hct  31.1 L    (39.0-53.0)  %


 


MCV  103.4 H    (80.0-100.0)  fL


 


RDW  16.3 H    (11.5-15.5)  %


 


Plt Count  110 L    (150-450)  k/uL


 


Lymphocytes #  0.7 L    (1.0-4.8)  k/uL


 


PT   13.3 H   (9.0-12.0)  sec


 


INR   1.3 H   (<1.2)  


 


Sodium    149 H  (137-145)  mmol/L


 


Chloride    116 H  ()  mmol/L


 


BUN    50 H  (9-20)  mg/dL


 


Creatinine    2.06 H  (0.66-1.25)  mg/dL


 


Glucose    146 H  (74-99)  mg/dL


 


POC Glucose (mg/dL)     (75-99)  mg/dL


 


Calcium    8.3 L  (8.4-10.2)  mg/dL


 


Magnesium    2.4 H  (1.6-2.3)  mg/dL


 


Troponin I     (0.000-0.034)  ng/mL


 


Total Protein    5.5 L  (6.3-8.2)  g/dL


 


Albumin    2.6 L  (3.5-5.0)  g/dL


 


Urine Protein     (Negative)  


 


Amorphous Sediment     (None)  /hpf














  07/05/21 07/05/21 07/05/21 Range/Units





  09:22 10:25 16:44 


 


RBC     (4.30-5.90)  m/uL


 


Hgb     (13.0-17.5)  gm/dL


 


Hct     (39.0-53.0)  %


 


MCV     (80.0-100.0)  fL


 


RDW     (11.5-15.5)  %


 


Plt Count     (150-450)  k/uL


 


Lymphocytes #     (1.0-4.8)  k/uL


 


PT     (9.0-12.0)  sec


 


INR     (<1.2)  


 


Sodium     (137-145)  mmol/L


 


Chloride     ()  mmol/L


 


BUN     (9-20)  mg/dL


 


Creatinine     (0.66-1.25)  mg/dL


 


Glucose     (74-99)  mg/dL


 


POC Glucose (mg/dL)    129 H  (75-99)  mg/dL


 


Calcium     (8.4-10.2)  mg/dL


 


Magnesium     (1.6-2.3)  mg/dL


 


Troponin I  0.129 H*    (0.000-0.034)  ng/mL


 


Total Protein     (6.3-8.2)  g/dL


 


Albumin     (3.5-5.0)  g/dL


 


Urine Protein   1+ H   (Negative)  


 


Amorphous Sediment   Occasional H   (None)  /hpf














  07/05/21 Range/Units





  20:13 


 


RBC   (4.30-5.90)  m/uL


 


Hgb   (13.0-17.5)  gm/dL


 


Hct   (39.0-53.0)  %


 


MCV   (80.0-100.0)  fL


 


RDW   (11.5-15.5)  %


 


Plt Count   (150-450)  k/uL


 


Lymphocytes #   (1.0-4.8)  k/uL


 


PT   (9.0-12.0)  sec


 


INR   (<1.2)  


 


Sodium   (137-145)  mmol/L


 


Chloride   ()  mmol/L


 


BUN   (9-20)  mg/dL


 


Creatinine   (0.66-1.25)  mg/dL


 


Glucose   (74-99)  mg/dL


 


POC Glucose (mg/dL)  202 H  (75-99)  mg/dL


 


Calcium   (8.4-10.2)  mg/dL


 


Magnesium   (1.6-2.3)  mg/dL


 


Troponin I   (0.000-0.034)  ng/mL


 


Total Protein   (6.3-8.2)  g/dL


 


Albumin   (3.5-5.0)  g/dL


 


Urine Protein   (Negative)  


 


Amorphous Sediment   (None)  /hpf














Assessment and Plan


Assessment: 








Poor oral intake and failure to thrive.


Acute on chronic kidney disease stage III.  Baseline creatinine 1.2.  2.06 on 

admission.


Hypernatremia due to dehydration volume depletion.


Chronic CHF with systolic dysfunction ejection fraction 20%


Valvular heart disease


Ischemic cardiomyopathy


Recent non-ST elevated MI conservative management as per cardiology


Elevated troponin level likely with acute kidney injury and demand ischemia.


Severe protein calorie malnutrition


Anemia of chronic disease


Diabetes type 2 insulin-dependent


Moderate pulmonary hypertension


Depression


History of prostate cancer with surgery in 2008


DVT prophylaxis with heparin subcu





Plan:


Patient will be continued on gentle IV hydration.  IV fluids will be changed to 

D5 water at 50 cc/h and monitor respiratory status.  Encourage oral intake.


Lasix is on hold.  Continue with her home medications and follow-up closely.  PT

 OT consult.


Current CODE STATUS is DNR/DNI.  Prognosis poor at this time due to limited oral

 intake and multiple comorbid conditions.





Time with Patient: Greater than 30
ED